# Patient Record
Sex: FEMALE | Race: WHITE | NOT HISPANIC OR LATINO | Employment: FULL TIME | URBAN - NONMETROPOLITAN AREA
[De-identification: names, ages, dates, MRNs, and addresses within clinical notes are randomized per-mention and may not be internally consistent; named-entity substitution may affect disease eponyms.]

---

## 2017-02-12 ENCOUNTER — HOSPITAL ENCOUNTER (INPATIENT)
Facility: HOSPITAL | Age: 37
LOS: 3 days | End: 2017-02-15
Attending: EMERGENCY MEDICINE | Admitting: INTERNAL MEDICINE

## 2017-02-12 ENCOUNTER — APPOINTMENT (OUTPATIENT)
Dept: GENERAL RADIOLOGY | Facility: HOSPITAL | Age: 37
End: 2017-02-12

## 2017-02-12 DIAGNOSIS — F10.939 ALCOHOL WITHDRAWAL, WITH UNSPECIFIED COMPLICATION (HCC): Primary | ICD-10-CM

## 2017-02-12 DIAGNOSIS — F41.9 ANXIETY: ICD-10-CM

## 2017-02-12 LAB
ACETONE BLD QL: NEGATIVE
ALBUMIN SERPL-MCNC: 4.6 G/DL (ref 3.4–4.8)
ALBUMIN/GLOB SERPL: 1.4 G/DL (ref 1.1–1.8)
ALP SERPL-CCNC: 85 U/L (ref 38–126)
ALT SERPL W P-5'-P-CCNC: 71 U/L (ref 9–52)
AMPHET+METHAMPHET UR QL: NEGATIVE
ANION GAP SERPL CALCULATED.3IONS-SCNC: 12 MMOL/L (ref 5–15)
ANION GAP SERPL CALCULATED.3IONS-SCNC: 17 MMOL/L (ref 5–15)
AST SERPL-CCNC: 48 U/L (ref 14–36)
B-HCG UR QL: NEGATIVE
BACTERIA UR QL AUTO: ABNORMAL /HPF
BARBITURATES UR QL SCN: NEGATIVE
BASOPHILS # BLD AUTO: 0.01 10*3/MM3 (ref 0–0.2)
BASOPHILS # BLD AUTO: 0.02 10*3/MM3 (ref 0–0.2)
BASOPHILS NFR BLD AUTO: 0.1 % (ref 0–2)
BASOPHILS NFR BLD AUTO: 0.3 % (ref 0–2)
BENZODIAZ UR QL SCN: NEGATIVE
BILIRUB SERPL-MCNC: 0.7 MG/DL (ref 0.2–1.3)
BILIRUB UR QL STRIP: NEGATIVE
BUN BLD-MCNC: 7 MG/DL (ref 7–21)
BUN BLD-MCNC: 8 MG/DL (ref 7–21)
BUN/CREAT SERPL: 13.7 (ref 7–25)
BUN/CREAT SERPL: 14 (ref 7–25)
CALCIUM SPEC-SCNC: 8.7 MG/DL (ref 8.4–10.2)
CALCIUM SPEC-SCNC: 9.5 MG/DL (ref 8.4–10.2)
CANNABINOIDS SERPL QL: NEGATIVE
CHLORIDE SERPL-SCNC: 92 MMOL/L (ref 95–110)
CHLORIDE SERPL-SCNC: 94 MMOL/L (ref 95–110)
CLARITY UR: CLEAR
CO2 SERPL-SCNC: 25 MMOL/L (ref 22–31)
CO2 SERPL-SCNC: 26 MMOL/L (ref 22–31)
COCAINE UR QL: NEGATIVE
COLOR UR: YELLOW
CREAT BLD-MCNC: 0.51 MG/DL (ref 0.5–1)
CREAT BLD-MCNC: 0.57 MG/DL (ref 0.5–1)
DEPRECATED RDW RBC AUTO: 43.2 FL (ref 36.4–46.3)
DEPRECATED RDW RBC AUTO: 44 FL (ref 36.4–46.3)
EOSINOPHIL # BLD AUTO: 0.01 10*3/MM3 (ref 0–0.7)
EOSINOPHIL # BLD AUTO: 0.05 10*3/MM3 (ref 0–0.7)
EOSINOPHIL NFR BLD AUTO: 0.1 % (ref 0–7)
EOSINOPHIL NFR BLD AUTO: 0.6 % (ref 0–7)
ERYTHROCYTE [DISTWIDTH] IN BLOOD BY AUTOMATED COUNT: 12.9 % (ref 11.5–14.5)
ERYTHROCYTE [DISTWIDTH] IN BLOOD BY AUTOMATED COUNT: 13 % (ref 11.5–14.5)
ETHANOL BLD-MCNC: <10 MG/DL (ref 0–10)
ETHANOL UR QL: <0.01 %
GFR SERPL CREATININE-BSD FRML MDRD: 119 ML/MIN/1.73 (ref 64–149)
GFR SERPL CREATININE-BSD FRML MDRD: 135 ML/MIN/1.73 (ref 64–149)
GLOBULIN UR ELPH-MCNC: 3.3 GM/DL (ref 2.3–3.5)
GLUCOSE BLD-MCNC: 266 MG/DL (ref 60–100)
GLUCOSE BLD-MCNC: 298 MG/DL (ref 60–100)
GLUCOSE BLDC GLUCOMTR-MCNC: 244 MG/DL (ref 70–130)
GLUCOSE BLDC GLUCOMTR-MCNC: 256 MG/DL (ref 70–130)
GLUCOSE BLDC GLUCOMTR-MCNC: 272 MG/DL (ref 70–130)
GLUCOSE UR STRIP-MCNC: ABNORMAL MG/DL
HCT VFR BLD AUTO: 37.1 % (ref 35–45)
HCT VFR BLD AUTO: 39 % (ref 35–45)
HGB BLD-MCNC: 13.5 G/DL (ref 12–15.5)
HGB BLD-MCNC: 14.2 G/DL (ref 12–15.5)
HGB UR QL STRIP.AUTO: NEGATIVE
HYALINE CASTS UR QL AUTO: ABNORMAL /LPF
IMM GRANULOCYTES # BLD: 0.02 10*3/MM3 (ref 0–0.02)
IMM GRANULOCYTES # BLD: 0.03 10*3/MM3 (ref 0–0.02)
IMM GRANULOCYTES NFR BLD: 0.3 % (ref 0–0.5)
IMM GRANULOCYTES NFR BLD: 0.4 % (ref 0–0.5)
KETONES UR QL STRIP: ABNORMAL
LEUKOCYTE ESTERASE UR QL STRIP.AUTO: NEGATIVE
LIPASE SERPL-CCNC: 124 U/L (ref 23–300)
LYMPHOCYTES # BLD AUTO: 1.29 10*3/MM3 (ref 0.6–4.2)
LYMPHOCYTES # BLD AUTO: 1.66 10*3/MM3 (ref 0.6–4.2)
LYMPHOCYTES NFR BLD AUTO: 18.6 % (ref 10–50)
LYMPHOCYTES NFR BLD AUTO: 20.6 % (ref 10–50)
MCH RBC QN AUTO: 33.8 PG (ref 26.5–34)
MCH RBC QN AUTO: 33.9 PG (ref 26.5–34)
MCHC RBC AUTO-ENTMCNC: 36.4 G/DL (ref 31.4–36)
MCHC RBC AUTO-ENTMCNC: 36.4 G/DL (ref 31.4–36)
MCV RBC AUTO: 92.8 FL (ref 80–98)
MCV RBC AUTO: 93.1 FL (ref 80–98)
METHADONE UR QL SCN: NEGATIVE
MONOCYTES # BLD AUTO: 0.37 10*3/MM3 (ref 0–0.9)
MONOCYTES # BLD AUTO: 0.66 10*3/MM3 (ref 0–0.9)
MONOCYTES NFR BLD AUTO: 5.3 % (ref 0–12)
MONOCYTES NFR BLD AUTO: 8.2 % (ref 0–12)
NEUTROPHILS # BLD AUTO: 5.24 10*3/MM3 (ref 2–8.6)
NEUTROPHILS # BLD AUTO: 5.66 10*3/MM3 (ref 2–8.6)
NEUTROPHILS NFR BLD AUTO: 70.1 % (ref 37–80)
NEUTROPHILS NFR BLD AUTO: 75.4 % (ref 37–80)
NITRITE UR QL STRIP: NEGATIVE
OPIATES UR QL: NEGATIVE
OXYCODONE UR QL SCN: NEGATIVE
PH BLDV: 7.47 [PH] (ref 7.31–7.42)
PH UR STRIP.AUTO: 6 [PH] (ref 5–9)
PLATELET # BLD AUTO: 184 10*3/MM3 (ref 150–450)
PLATELET # BLD AUTO: 185 10*3/MM3 (ref 150–450)
PMV BLD AUTO: 10.1 FL (ref 8–12)
PMV BLD AUTO: 9.9 FL (ref 8–12)
POTASSIUM BLD-SCNC: 4.1 MMOL/L (ref 3.5–5.1)
POTASSIUM BLD-SCNC: 4.2 MMOL/L (ref 3.5–5.1)
PROT SERPL-MCNC: 7.9 G/DL (ref 6.3–8.6)
PROT UR QL STRIP: ABNORMAL
RBC # BLD AUTO: 4 10*6/MM3 (ref 3.77–5.16)
RBC # BLD AUTO: 4.19 10*6/MM3 (ref 3.77–5.16)
RBC # UR: ABNORMAL /HPF
REF LAB TEST METHOD: ABNORMAL
SODIUM BLD-SCNC: 132 MMOL/L (ref 137–145)
SODIUM BLD-SCNC: 134 MMOL/L (ref 137–145)
SP GR UR STRIP: 1.03 (ref 1–1.03)
SQUAMOUS #/AREA URNS HPF: ABNORMAL /HPF
TROPONIN I SERPL-MCNC: <0.012 NG/ML
TSH SERPL DL<=0.05 MIU/L-ACNC: 1.64 MIU/ML (ref 0.46–4.68)
UROBILINOGEN UR QL STRIP: ABNORMAL
WBC NRBC COR # BLD: 6.95 10*3/MM3 (ref 3.2–9.8)
WBC NRBC COR # BLD: 8.07 10*3/MM3 (ref 3.2–9.8)
WBC UR QL AUTO: ABNORMAL /HPF
WHOLE BLOOD HOLD SPECIMEN: NORMAL

## 2017-02-12 PROCEDURE — 80053 COMPREHEN METABOLIC PANEL: CPT | Performed by: EMERGENCY MEDICINE

## 2017-02-12 PROCEDURE — 82962 GLUCOSE BLOOD TEST: CPT

## 2017-02-12 PROCEDURE — 80307 DRUG TEST PRSMV CHEM ANLYZR: CPT | Performed by: EMERGENCY MEDICINE

## 2017-02-12 PROCEDURE — 94640 AIRWAY INHALATION TREATMENT: CPT

## 2017-02-12 PROCEDURE — 25810000003 DEXTROSE-NACL PER 500 ML: Performed by: EMERGENCY MEDICINE

## 2017-02-12 PROCEDURE — 85025 COMPLETE CBC W/AUTO DIFF WBC: CPT | Performed by: FAMILY MEDICINE

## 2017-02-12 PROCEDURE — 63710000001 INSULIN DETEMIR PER 5 UNITS: Performed by: FAMILY MEDICINE

## 2017-02-12 PROCEDURE — 84443 ASSAY THYROID STIM HORMONE: CPT | Performed by: EMERGENCY MEDICINE

## 2017-02-12 PROCEDURE — 25010000002 THIAMINE PER 100 MG: Performed by: EMERGENCY MEDICINE

## 2017-02-12 PROCEDURE — 82009 KETONE BODYS QUAL: CPT | Performed by: EMERGENCY MEDICINE

## 2017-02-12 PROCEDURE — 63710000001 INSULIN ASPART PER 5 UNITS: Performed by: FAMILY MEDICINE

## 2017-02-12 PROCEDURE — 85025 COMPLETE CBC W/AUTO DIFF WBC: CPT | Performed by: EMERGENCY MEDICINE

## 2017-02-12 PROCEDURE — 25010000002 MAGNESIUM SULFATE PER 500 MG OF MAGNESIUM: Performed by: EMERGENCY MEDICINE

## 2017-02-12 PROCEDURE — 82800 BLOOD PH: CPT | Performed by: EMERGENCY MEDICINE

## 2017-02-12 PROCEDURE — 25010000002 ONDANSETRON PER 1 MG: Performed by: EMERGENCY MEDICINE

## 2017-02-12 PROCEDURE — 71010 HC CHEST PA OR AP: CPT

## 2017-02-12 PROCEDURE — 83690 ASSAY OF LIPASE: CPT | Performed by: EMERGENCY MEDICINE

## 2017-02-12 PROCEDURE — 84484 ASSAY OF TROPONIN QUANT: CPT | Performed by: EMERGENCY MEDICINE

## 2017-02-12 PROCEDURE — G0378 HOSPITAL OBSERVATION PER HR: HCPCS

## 2017-02-12 PROCEDURE — 81001 URINALYSIS AUTO W/SCOPE: CPT | Performed by: EMERGENCY MEDICINE

## 2017-02-12 PROCEDURE — 99284 EMERGENCY DEPT VISIT MOD MDM: CPT

## 2017-02-12 PROCEDURE — 81025 URINE PREGNANCY TEST: CPT | Performed by: EMERGENCY MEDICINE

## 2017-02-12 RX ORDER — BLOOD-GLUCOSE METER
KIT MISCELLANEOUS
COMMUNITY
Start: 2016-12-09 | End: 2017-12-10

## 2017-02-12 RX ORDER — PROMETHAZINE HYDROCHLORIDE 25 MG/1
25 TABLET ORAL EVERY 6 HOURS PRN
Status: DISCONTINUED | OUTPATIENT
Start: 2017-02-12 | End: 2017-02-15 | Stop reason: HOSPADM

## 2017-02-12 RX ORDER — ALBUTEROL SULFATE 90 UG/1
2 AEROSOL, METERED RESPIRATORY (INHALATION)
COMMUNITY
Start: 2016-11-14 | End: 2018-01-30 | Stop reason: HOSPADM

## 2017-02-12 RX ORDER — ALBUTEROL SULFATE 2.5 MG/3ML
2.5 SOLUTION RESPIRATORY (INHALATION)
Status: DISCONTINUED | OUTPATIENT
Start: 2017-02-12 | End: 2017-02-15 | Stop reason: HOSPADM

## 2017-02-12 RX ORDER — CITALOPRAM 40 MG/1
40 TABLET ORAL
COMMUNITY
Start: 2016-11-14 | End: 2018-03-02 | Stop reason: HOSPADM

## 2017-02-12 RX ORDER — ONDANSETRON 2 MG/ML
4 INJECTION INTRAMUSCULAR; INTRAVENOUS EVERY 6 HOURS PRN
Status: DISCONTINUED | OUTPATIENT
Start: 2017-02-12 | End: 2017-02-15 | Stop reason: HOSPADM

## 2017-02-12 RX ORDER — DOCUSATE SODIUM 100 MG/1
100 CAPSULE, LIQUID FILLED ORAL 2 TIMES DAILY
Status: DISCONTINUED | OUTPATIENT
Start: 2017-02-12 | End: 2017-02-15 | Stop reason: HOSPADM

## 2017-02-12 RX ORDER — ACETAMINOPHEN 325 MG/1
650 TABLET ORAL EVERY 4 HOURS PRN
Status: DISCONTINUED | OUTPATIENT
Start: 2017-02-12 | End: 2017-02-15 | Stop reason: HOSPADM

## 2017-02-12 RX ORDER — SODIUM CHLORIDE 0.9 % (FLUSH) 0.9 %
1-10 SYRINGE (ML) INJECTION AS NEEDED
Status: DISCONTINUED | OUTPATIENT
Start: 2017-02-12 | End: 2017-02-15 | Stop reason: HOSPADM

## 2017-02-12 RX ORDER — LANCETS 23 GAUGE
1 EACH MISCELLANEOUS
COMMUNITY
Start: 2016-12-09

## 2017-02-12 RX ORDER — LISINOPRIL 10 MG/1
TABLET ORAL
COMMUNITY
Start: 2016-11-14 | End: 2018-03-02 | Stop reason: HOSPADM

## 2017-02-12 RX ORDER — ALPRAZOLAM 0.5 MG/1
0.5 TABLET ORAL ONCE
Status: COMPLETED | OUTPATIENT
Start: 2017-02-12 | End: 2017-02-12

## 2017-02-12 RX ORDER — ALBUTEROL SULFATE 90 UG/1
2 AEROSOL, METERED RESPIRATORY (INHALATION)
Status: DISCONTINUED | OUTPATIENT
Start: 2017-02-12 | End: 2017-02-12 | Stop reason: ALTCHOICE

## 2017-02-12 RX ORDER — NICOTINE POLACRILEX 4 MG
15 LOZENGE BUCCAL
Status: DISCONTINUED | OUTPATIENT
Start: 2017-02-12 | End: 2017-02-15 | Stop reason: HOSPADM

## 2017-02-12 RX ORDER — DEXTROSE MONOHYDRATE 25 G/50ML
25 INJECTION, SOLUTION INTRAVENOUS
Status: DISCONTINUED | OUTPATIENT
Start: 2017-02-12 | End: 2017-02-15 | Stop reason: HOSPADM

## 2017-02-12 RX ORDER — GLYBURIDE 5 MG/1
5 TABLET ORAL
Status: DISCONTINUED | OUTPATIENT
Start: 2017-02-13 | End: 2017-02-15 | Stop reason: HOSPADM

## 2017-02-12 RX ORDER — ONDANSETRON 2 MG/ML
4 INJECTION INTRAMUSCULAR; INTRAVENOUS ONCE
Status: COMPLETED | OUTPATIENT
Start: 2017-02-12 | End: 2017-02-12

## 2017-02-12 RX ORDER — CITALOPRAM 40 MG/1
40 TABLET ORAL DAILY
Status: DISCONTINUED | OUTPATIENT
Start: 2017-02-13 | End: 2017-02-15 | Stop reason: HOSPADM

## 2017-02-12 RX ORDER — PANTOPRAZOLE SODIUM 40 MG/1
40 TABLET, DELAYED RELEASE ORAL
Status: DISCONTINUED | OUTPATIENT
Start: 2017-02-13 | End: 2017-02-15 | Stop reason: HOSPADM

## 2017-02-12 RX ORDER — LISINOPRIL 10 MG/1
10 TABLET ORAL
COMMUNITY
Start: 2016-11-14 | End: 2017-03-09

## 2017-02-12 RX ADMIN — ONDANSETRON 4 MG: 2 INJECTION INTRAMUSCULAR; INTRAVENOUS at 17:45

## 2017-02-12 RX ADMIN — INSULIN ASPART 4 UNITS: 100 INJECTION, SOLUTION INTRAVENOUS; SUBCUTANEOUS at 20:56

## 2017-02-12 RX ADMIN — ALPRAZOLAM 0.5 MG: 0.5 TABLET ORAL at 17:49

## 2017-02-12 RX ADMIN — ALBUTEROL SULFATE 2.5 MG: 2.5 SOLUTION RESPIRATORY (INHALATION) at 21:03

## 2017-02-12 RX ADMIN — FOLIC ACID 1000 ML/HR: 5 INJECTION, SOLUTION INTRAMUSCULAR; INTRAVENOUS; SUBCUTANEOUS at 19:24

## 2017-02-12 RX ADMIN — SODIUM CHLORIDE 1000 ML: 9 INJECTION, SOLUTION INTRAVENOUS at 16:21

## 2017-02-12 RX ADMIN — INSULIN DETEMIR 10 UNITS: 100 INJECTION, SOLUTION SUBCUTANEOUS at 20:56

## 2017-02-13 PROBLEM — F10.20 ALCOHOL USE DISORDER, SEVERE, DEPENDENCE: Status: ACTIVE | Noted: 2017-02-13

## 2017-02-13 PROBLEM — F10.280: Status: ACTIVE | Noted: 2017-02-13

## 2017-02-13 PROBLEM — F10.239: Status: ACTIVE | Noted: 2017-02-13

## 2017-02-13 LAB
ANION GAP SERPL CALCULATED.3IONS-SCNC: 6 MMOL/L (ref 5–15)
BASOPHILS # BLD AUTO: 0.02 10*3/MM3 (ref 0–0.2)
BASOPHILS NFR BLD AUTO: 0.3 % (ref 0–2)
BUN BLD-MCNC: 5 MG/DL (ref 7–21)
BUN/CREAT SERPL: 9.8 (ref 7–25)
CALCIUM SPEC-SCNC: 8.4 MG/DL (ref 8.4–10.2)
CHLORIDE SERPL-SCNC: 98 MMOL/L (ref 95–110)
CO2 SERPL-SCNC: 30 MMOL/L (ref 22–31)
CREAT BLD-MCNC: 0.51 MG/DL (ref 0.5–1)
DEPRECATED RDW RBC AUTO: 43.7 FL (ref 36.4–46.3)
EOSINOPHIL # BLD AUTO: 0.07 10*3/MM3 (ref 0–0.7)
EOSINOPHIL NFR BLD AUTO: 1.2 % (ref 0–7)
ERYTHROCYTE [DISTWIDTH] IN BLOOD BY AUTOMATED COUNT: 13.1 % (ref 11.5–14.5)
GFR SERPL CREATININE-BSD FRML MDRD: 135 ML/MIN/1.73 (ref 60–149)
GLUCOSE BLD-MCNC: 191 MG/DL (ref 60–100)
GLUCOSE BLDC GLUCOMTR-MCNC: 196 MG/DL (ref 70–130)
GLUCOSE BLDC GLUCOMTR-MCNC: 204 MG/DL (ref 70–130)
GLUCOSE BLDC GLUCOMTR-MCNC: 225 MG/DL (ref 70–130)
GLUCOSE BLDC GLUCOMTR-MCNC: 278 MG/DL (ref 70–130)
GLUCOSE BLDC GLUCOMTR-MCNC: 298 MG/DL (ref 70–130)
HCT VFR BLD AUTO: 35.9 % (ref 35–45)
HGB BLD-MCNC: 13 G/DL (ref 12–15.5)
IMM GRANULOCYTES # BLD: 0.01 10*3/MM3 (ref 0–0.02)
IMM GRANULOCYTES NFR BLD: 0.2 % (ref 0–0.5)
LYMPHOCYTES # BLD AUTO: 2.07 10*3/MM3 (ref 0.6–4.2)
LYMPHOCYTES NFR BLD AUTO: 34.2 % (ref 10–50)
MCH RBC QN AUTO: 33.6 PG (ref 26.5–34)
MCHC RBC AUTO-ENTMCNC: 36.2 G/DL (ref 31.4–36)
MCV RBC AUTO: 92.8 FL (ref 80–98)
MONOCYTES # BLD AUTO: 0.55 10*3/MM3 (ref 0–0.9)
MONOCYTES NFR BLD AUTO: 9.1 % (ref 0–12)
NEUTROPHILS # BLD AUTO: 3.34 10*3/MM3 (ref 2–8.6)
NEUTROPHILS NFR BLD AUTO: 55 % (ref 37–80)
NRBC BLD MANUAL-RTO: 0 /100 WBC (ref 0–0)
PLATELET # BLD AUTO: 177 10*3/MM3 (ref 150–450)
PMV BLD AUTO: 10.1 FL (ref 8–12)
POTASSIUM BLD-SCNC: 3.2 MMOL/L (ref 3.5–5.1)
RBC # BLD AUTO: 3.87 10*6/MM3 (ref 3.77–5.16)
SODIUM BLD-SCNC: 134 MMOL/L (ref 137–145)
WBC NRBC COR # BLD: 6.06 10*3/MM3 (ref 3.2–9.8)

## 2017-02-13 PROCEDURE — 85025 COMPLETE CBC W/AUTO DIFF WBC: CPT | Performed by: FAMILY MEDICINE

## 2017-02-13 PROCEDURE — 99252 IP/OBS CONSLTJ NEW/EST SF 35: CPT | Performed by: NURSE PRACTITIONER

## 2017-02-13 PROCEDURE — 25010000002 MAGNESIUM SULFATE PER 500 MG OF MAGNESIUM: Performed by: NURSE PRACTITIONER

## 2017-02-13 PROCEDURE — 94640 AIRWAY INHALATION TREATMENT: CPT

## 2017-02-13 PROCEDURE — 63710000001 INSULIN ASPART PER 5 UNITS: Performed by: FAMILY MEDICINE

## 2017-02-13 PROCEDURE — 80048 BASIC METABOLIC PNL TOTAL CA: CPT | Performed by: FAMILY MEDICINE

## 2017-02-13 PROCEDURE — 25010000002 ONDANSETRON PER 1 MG: Performed by: FAMILY MEDICINE

## 2017-02-13 PROCEDURE — 82962 GLUCOSE BLOOD TEST: CPT

## 2017-02-13 PROCEDURE — 63710000001 INSULIN DETEMIR PER 5 UNITS: Performed by: FAMILY MEDICINE

## 2017-02-13 PROCEDURE — G0378 HOSPITAL OBSERVATION PER HR: HCPCS

## 2017-02-13 PROCEDURE — 25010000002 THIAMINE PER 100 MG: Performed by: NURSE PRACTITIONER

## 2017-02-13 PROCEDURE — 25010000002 LORAZEPAM PER 2 MG: Performed by: NURSE PRACTITIONER

## 2017-02-13 RX ORDER — LORAZEPAM 2 MG/ML
1 INJECTION INTRAMUSCULAR
Status: DISCONTINUED | OUTPATIENT
Start: 2017-02-13 | End: 2017-02-15 | Stop reason: HOSPADM

## 2017-02-13 RX ORDER — LORAZEPAM 2 MG/ML
2 INJECTION INTRAMUSCULAR
Status: DISCONTINUED | OUTPATIENT
Start: 2017-02-13 | End: 2017-02-15 | Stop reason: HOSPADM

## 2017-02-13 RX ORDER — LORAZEPAM 1 MG/1
1 TABLET ORAL
Status: DISCONTINUED | OUTPATIENT
Start: 2017-02-13 | End: 2017-02-15 | Stop reason: HOSPADM

## 2017-02-13 RX ORDER — LORAZEPAM 2 MG/1
2 TABLET ORAL
Status: DISCONTINUED | OUTPATIENT
Start: 2017-02-13 | End: 2017-02-15 | Stop reason: HOSPADM

## 2017-02-13 RX ORDER — POTASSIUM CHLORIDE 20 MEQ/1
40 TABLET, EXTENDED RELEASE ORAL ONCE
Status: COMPLETED | OUTPATIENT
Start: 2017-02-13 | End: 2017-02-13

## 2017-02-13 RX ADMIN — INSULIN ASPART 2 UNITS: 100 INJECTION, SOLUTION INTRAVENOUS; SUBCUTANEOUS at 08:36

## 2017-02-13 RX ADMIN — INSULIN ASPART 10 UNITS: 100 INJECTION, SOLUTION INTRAVENOUS; SUBCUTANEOUS at 12:14

## 2017-02-13 RX ADMIN — ALBUTEROL SULFATE 2.5 MG: 2.5 SOLUTION RESPIRATORY (INHALATION) at 16:06

## 2017-02-13 RX ADMIN — ONDANSETRON 4 MG: 2 INJECTION INTRAMUSCULAR; INTRAVENOUS at 08:36

## 2017-02-13 RX ADMIN — METFORMIN HYDROCHLORIDE 500 MG: 500 TABLET ORAL at 08:38

## 2017-02-13 RX ADMIN — LORAZEPAM 1 MG: 2 INJECTION INTRAMUSCULAR; INTRAVENOUS at 15:33

## 2017-02-13 RX ADMIN — PANTOPRAZOLE SODIUM 40 MG: 40 TABLET, DELAYED RELEASE ORAL at 05:31

## 2017-02-13 RX ADMIN — INSULIN ASPART 10 UNITS: 100 INJECTION, SOLUTION INTRAVENOUS; SUBCUTANEOUS at 08:38

## 2017-02-13 RX ADMIN — INSULIN ASPART 4 UNITS: 100 INJECTION, SOLUTION INTRAVENOUS; SUBCUTANEOUS at 20:28

## 2017-02-13 RX ADMIN — INSULIN ASPART 3 UNITS: 100 INJECTION, SOLUTION INTRAVENOUS; SUBCUTANEOUS at 12:14

## 2017-02-13 RX ADMIN — POTASSIUM CHLORIDE 40 MEQ: 20 TABLET, EXTENDED RELEASE ORAL at 15:33

## 2017-02-13 RX ADMIN — ALBUTEROL SULFATE 2.5 MG: 2.5 SOLUTION RESPIRATORY (INHALATION) at 08:07

## 2017-02-13 RX ADMIN — INSULIN DETEMIR 10 UNITS: 100 INJECTION, SOLUTION SUBCUTANEOUS at 20:29

## 2017-02-13 RX ADMIN — POTASSIUM CHLORIDE 40 MEQ: 20 TABLET, EXTENDED RELEASE ORAL at 17:33

## 2017-02-13 RX ADMIN — METFORMIN HYDROCHLORIDE 500 MG: 500 TABLET ORAL at 17:33

## 2017-02-13 RX ADMIN — INSULIN ASPART 10 UNITS: 100 INJECTION, SOLUTION INTRAVENOUS; SUBCUTANEOUS at 17:34

## 2017-02-13 RX ADMIN — FOLIC ACID 100 ML/HR: 5 INJECTION, SOLUTION INTRAMUSCULAR; INTRAVENOUS; SUBCUTANEOUS at 17:32

## 2017-02-13 RX ADMIN — INSULIN ASPART 3 UNITS: 100 INJECTION, SOLUTION INTRAVENOUS; SUBCUTANEOUS at 17:33

## 2017-02-13 RX ADMIN — CITALOPRAM HYDROBROMIDE 40 MG: 40 TABLET ORAL at 08:36

## 2017-02-14 LAB
ANION GAP SERPL CALCULATED.3IONS-SCNC: 6 MMOL/L (ref 5–15)
BASOPHILS # BLD AUTO: 0 10*3/MM3 (ref 0–0.2)
BASOPHILS NFR BLD AUTO: 0 % (ref 0–2)
BUN BLD-MCNC: 7 MG/DL (ref 7–21)
BUN/CREAT SERPL: 12.7 (ref 7–25)
CALCIUM SPEC-SCNC: 8.7 MG/DL (ref 8.4–10.2)
CHLORIDE SERPL-SCNC: 102 MMOL/L (ref 95–110)
CO2 SERPL-SCNC: 26 MMOL/L (ref 22–31)
CREAT BLD-MCNC: 0.55 MG/DL (ref 0.5–1)
DEPRECATED RDW RBC AUTO: 47.9 FL (ref 36.4–46.3)
EOSINOPHIL # BLD AUTO: 0.09 10*3/MM3 (ref 0–0.7)
EOSINOPHIL NFR BLD AUTO: 1.9 % (ref 0–7)
ERYTHROCYTE [DISTWIDTH] IN BLOOD BY AUTOMATED COUNT: 13.7 % (ref 11.5–14.5)
GFR SERPL CREATININE-BSD FRML MDRD: 124 ML/MIN/1.73 (ref 60–149)
GLUCOSE BLD-MCNC: 241 MG/DL (ref 60–100)
GLUCOSE BLDC GLUCOMTR-MCNC: 155 MG/DL (ref 70–130)
GLUCOSE BLDC GLUCOMTR-MCNC: 242 MG/DL (ref 70–130)
GLUCOSE BLDC GLUCOMTR-MCNC: 265 MG/DL (ref 70–130)
GLUCOSE BLDC GLUCOMTR-MCNC: 84 MG/DL (ref 70–130)
HCT VFR BLD AUTO: 37.8 % (ref 35–45)
HGB BLD-MCNC: 13.1 G/DL (ref 12–15.5)
IMM GRANULOCYTES # BLD: 0.01 10*3/MM3 (ref 0–0.02)
IMM GRANULOCYTES NFR BLD: 0.2 % (ref 0–0.5)
LYMPHOCYTES # BLD AUTO: 1.18 10*3/MM3 (ref 0.6–4.2)
LYMPHOCYTES NFR BLD AUTO: 25.3 % (ref 10–50)
MCH RBC QN AUTO: 33.2 PG (ref 26.5–34)
MCHC RBC AUTO-ENTMCNC: 34.7 G/DL (ref 31.4–36)
MCV RBC AUTO: 95.7 FL (ref 80–98)
MONOCYTES # BLD AUTO: 0.37 10*3/MM3 (ref 0–0.9)
MONOCYTES NFR BLD AUTO: 7.9 % (ref 0–12)
NEUTROPHILS # BLD AUTO: 3.02 10*3/MM3 (ref 2–8.6)
NEUTROPHILS NFR BLD AUTO: 64.7 % (ref 37–80)
PLATELET # BLD AUTO: 156 10*3/MM3 (ref 150–450)
PMV BLD AUTO: 9.8 FL (ref 8–12)
POTASSIUM BLD-SCNC: 4.5 MMOL/L (ref 3.5–5.1)
RBC # BLD AUTO: 3.95 10*6/MM3 (ref 3.77–5.16)
SODIUM BLD-SCNC: 134 MMOL/L (ref 137–145)
WBC NRBC COR # BLD: 4.67 10*3/MM3 (ref 3.2–9.8)

## 2017-02-14 PROCEDURE — 63710000001 INSULIN ASPART PER 5 UNITS: Performed by: FAMILY MEDICINE

## 2017-02-14 PROCEDURE — 80048 BASIC METABOLIC PNL TOTAL CA: CPT | Performed by: FAMILY MEDICINE

## 2017-02-14 PROCEDURE — 63710000001 INSULIN DETEMIR PER 5 UNITS: Performed by: NURSE PRACTITIONER

## 2017-02-14 PROCEDURE — 82962 GLUCOSE BLOOD TEST: CPT

## 2017-02-14 PROCEDURE — 25010000002 MAGNESIUM SULFATE PER 500 MG OF MAGNESIUM: Performed by: NURSE PRACTITIONER

## 2017-02-14 PROCEDURE — 85025 COMPLETE CBC W/AUTO DIFF WBC: CPT | Performed by: FAMILY MEDICINE

## 2017-02-14 PROCEDURE — 25010000002 THIAMINE PER 100 MG: Performed by: NURSE PRACTITIONER

## 2017-02-14 PROCEDURE — 94640 AIRWAY INHALATION TREATMENT: CPT

## 2017-02-14 RX ORDER — NICOTINE 21 MG/24HR
1 PATCH, TRANSDERMAL 24 HOURS TRANSDERMAL EVERY 24 HOURS
Status: DISCONTINUED | OUTPATIENT
Start: 2017-02-14 | End: 2017-02-15 | Stop reason: HOSPADM

## 2017-02-14 RX ADMIN — CITALOPRAM HYDROBROMIDE 40 MG: 40 TABLET ORAL at 09:33

## 2017-02-14 RX ADMIN — INSULIN ASPART 10 UNITS: 100 INJECTION, SOLUTION INTRAVENOUS; SUBCUTANEOUS at 11:22

## 2017-02-14 RX ADMIN — FOLIC ACID 100 ML/HR: 5 INJECTION, SOLUTION INTRAMUSCULAR; INTRAVENOUS; SUBCUTANEOUS at 11:20

## 2017-02-14 RX ADMIN — GLYBURIDE 5 MG: 5 TABLET ORAL at 09:32

## 2017-02-14 RX ADMIN — METFORMIN HYDROCHLORIDE 500 MG: 500 TABLET ORAL at 09:33

## 2017-02-14 RX ADMIN — LORAZEPAM 1 MG: 1 TABLET ORAL at 07:42

## 2017-02-14 RX ADMIN — INSULIN ASPART 4 UNITS: 100 INJECTION, SOLUTION INTRAVENOUS; SUBCUTANEOUS at 09:33

## 2017-02-14 RX ADMIN — INSULIN ASPART 10 UNITS: 100 INJECTION, SOLUTION INTRAVENOUS; SUBCUTANEOUS at 17:50

## 2017-02-14 RX ADMIN — DOCUSATE SODIUM 100 MG: 100 CAPSULE, LIQUID FILLED ORAL at 09:33

## 2017-02-14 RX ADMIN — INSULIN ASPART 2 UNITS: 100 INJECTION, SOLUTION INTRAVENOUS; SUBCUTANEOUS at 17:49

## 2017-02-14 RX ADMIN — INSULIN ASPART 3 UNITS: 100 INJECTION, SOLUTION INTRAVENOUS; SUBCUTANEOUS at 11:21

## 2017-02-14 RX ADMIN — ALBUTEROL SULFATE 2.5 MG: 2.5 SOLUTION RESPIRATORY (INHALATION) at 07:45

## 2017-02-14 RX ADMIN — LORAZEPAM 1 MG: 1 TABLET ORAL at 11:30

## 2017-02-14 RX ADMIN — PANTOPRAZOLE SODIUM 40 MG: 40 TABLET, DELAYED RELEASE ORAL at 06:08

## 2017-02-14 RX ADMIN — INSULIN ASPART 10 UNITS: 100 INJECTION, SOLUTION INTRAVENOUS; SUBCUTANEOUS at 09:39

## 2017-02-14 RX ADMIN — ALBUTEROL SULFATE 2.5 MG: 2.5 SOLUTION RESPIRATORY (INHALATION) at 20:42

## 2017-02-14 RX ADMIN — DOCUSATE SODIUM 100 MG: 100 CAPSULE, LIQUID FILLED ORAL at 17:48

## 2017-02-14 RX ADMIN — INSULIN DETEMIR 15 UNITS: 100 INJECTION, SOLUTION SUBCUTANEOUS at 20:40

## 2017-02-14 RX ADMIN — METFORMIN HYDROCHLORIDE 500 MG: 500 TABLET ORAL at 17:52

## 2017-02-15 ENCOUNTER — HOSPITAL ENCOUNTER (INPATIENT)
Facility: HOSPITAL | Age: 37
LOS: 3 days | Discharge: HOME | End: 2017-02-18
Attending: PSYCHIATRY & NEUROLOGY | Admitting: PSYCHIATRY & NEUROLOGY

## 2017-02-15 VITALS
BODY MASS INDEX: 33.75 KG/M2 | RESPIRATION RATE: 18 BRPM | DIASTOLIC BLOOD PRESSURE: 96 MMHG | TEMPERATURE: 97.6 F | SYSTOLIC BLOOD PRESSURE: 152 MMHG | HEIGHT: 66 IN | HEART RATE: 104 BPM | OXYGEN SATURATION: 96 % | WEIGHT: 210 LBS

## 2017-02-15 PROBLEM — R45.851 SUICIDAL IDEATION: Status: ACTIVE | Noted: 2017-02-15

## 2017-02-15 LAB
ANION GAP SERPL CALCULATED.3IONS-SCNC: 8 MMOL/L (ref 5–15)
BASOPHILS # BLD AUTO: 0.01 10*3/MM3 (ref 0–0.2)
BASOPHILS NFR BLD AUTO: 0.1 % (ref 0–2)
BUN BLD-MCNC: 10 MG/DL (ref 7–21)
BUN/CREAT SERPL: 16.4 (ref 7–25)
CALCIUM SPEC-SCNC: 9.3 MG/DL (ref 8.4–10.2)
CHLORIDE SERPL-SCNC: 99 MMOL/L (ref 95–110)
CO2 SERPL-SCNC: 29 MMOL/L (ref 22–31)
CREAT BLD-MCNC: 0.61 MG/DL (ref 0.5–1)
DEPRECATED RDW RBC AUTO: 47.4 FL (ref 36.4–46.3)
EOSINOPHIL # BLD AUTO: 0.15 10*3/MM3 (ref 0–0.7)
EOSINOPHIL NFR BLD AUTO: 2 % (ref 0–7)
ERYTHROCYTE [DISTWIDTH] IN BLOOD BY AUTOMATED COUNT: 13.6 % (ref 11.5–14.5)
GFR SERPL CREATININE-BSD FRML MDRD: 110 ML/MIN/1.73 (ref 64–149)
GLUCOSE BLD-MCNC: 172 MG/DL (ref 60–100)
GLUCOSE BLDC GLUCOMTR-MCNC: 215 MG/DL (ref 70–130)
GLUCOSE BLDC GLUCOMTR-MCNC: 216 MG/DL (ref 70–130)
GLUCOSE BLDC GLUCOMTR-MCNC: 242 MG/DL (ref 70–130)
HCT VFR BLD AUTO: 38.9 % (ref 35–45)
HGB BLD-MCNC: 13.3 G/DL (ref 12–15.5)
IMM GRANULOCYTES # BLD: 0.02 10*3/MM3 (ref 0–0.02)
IMM GRANULOCYTES NFR BLD: 0.3 % (ref 0–0.5)
LYMPHOCYTES # BLD AUTO: 1.64 10*3/MM3 (ref 0.6–4.2)
LYMPHOCYTES NFR BLD AUTO: 21.6 % (ref 10–50)
MCH RBC QN AUTO: 32.7 PG (ref 26.5–34)
MCHC RBC AUTO-ENTMCNC: 34.2 G/DL (ref 31.4–36)
MCV RBC AUTO: 95.6 FL (ref 80–98)
MONOCYTES # BLD AUTO: 0.49 10*3/MM3 (ref 0–0.9)
MONOCYTES NFR BLD AUTO: 6.5 % (ref 0–12)
NEUTROPHILS # BLD AUTO: 5.28 10*3/MM3 (ref 2–8.6)
NEUTROPHILS NFR BLD AUTO: 69.5 % (ref 37–80)
PLATELET # BLD AUTO: 185 10*3/MM3 (ref 150–450)
PMV BLD AUTO: 11.3 FL (ref 8–12)
POTASSIUM BLD-SCNC: 4.1 MMOL/L (ref 3.5–5.1)
RBC # BLD AUTO: 4.07 10*6/MM3 (ref 3.77–5.16)
SODIUM BLD-SCNC: 136 MMOL/L (ref 137–145)
WBC NRBC COR # BLD: 7.59 10*3/MM3 (ref 3.2–9.8)

## 2017-02-15 PROCEDURE — 82962 GLUCOSE BLOOD TEST: CPT

## 2017-02-15 PROCEDURE — 63710000001 INSULIN ASPART PER 5 UNITS: Performed by: FAMILY MEDICINE

## 2017-02-15 PROCEDURE — 85025 COMPLETE CBC W/AUTO DIFF WBC: CPT | Performed by: FAMILY MEDICINE

## 2017-02-15 PROCEDURE — 94760 N-INVAS EAR/PLS OXIMETRY 1: CPT

## 2017-02-15 PROCEDURE — 25010000002 THIAMINE PER 100 MG: Performed by: NURSE PRACTITIONER

## 2017-02-15 PROCEDURE — 63710000001 INSULIN ASPART PER 5 UNITS: Performed by: PSYCHIATRY & NEUROLOGY

## 2017-02-15 PROCEDURE — 94640 AIRWAY INHALATION TREATMENT: CPT

## 2017-02-15 PROCEDURE — 25010000002 MAGNESIUM SULFATE PER 500 MG OF MAGNESIUM: Performed by: NURSE PRACTITIONER

## 2017-02-15 PROCEDURE — 63710000001 INSULIN DETEMIR PER 5 UNITS: Performed by: PSYCHIATRY & NEUROLOGY

## 2017-02-15 PROCEDURE — 80048 BASIC METABOLIC PNL TOTAL CA: CPT | Performed by: FAMILY MEDICINE

## 2017-02-15 RX ORDER — DOCUSATE SODIUM 100 MG/1
100 CAPSULE, LIQUID FILLED ORAL 2 TIMES DAILY
Status: DISCONTINUED | OUTPATIENT
Start: 2017-02-15 | End: 2017-02-18 | Stop reason: HOSPADM

## 2017-02-15 RX ORDER — ACETAMINOPHEN 325 MG/1
650 TABLET ORAL EVERY 4 HOURS PRN
Status: DISCONTINUED | OUTPATIENT
Start: 2017-02-15 | End: 2017-02-18 | Stop reason: HOSPADM

## 2017-02-15 RX ORDER — DOCUSATE SODIUM 100 MG/1
100 CAPSULE, LIQUID FILLED ORAL 2 TIMES DAILY
Status: CANCELLED | OUTPATIENT
Start: 2017-02-15

## 2017-02-15 RX ORDER — ALBUTEROL SULFATE 2.5 MG/3ML
2.5 SOLUTION RESPIRATORY (INHALATION)
Status: DISCONTINUED | OUTPATIENT
Start: 2017-02-15 | End: 2017-02-15

## 2017-02-15 RX ORDER — PANTOPRAZOLE SODIUM 40 MG/1
40 TABLET, DELAYED RELEASE ORAL
Status: DISCONTINUED | OUTPATIENT
Start: 2017-02-16 | End: 2017-02-15 | Stop reason: SDUPTHER

## 2017-02-15 RX ORDER — LOPERAMIDE HYDROCHLORIDE 2 MG/1
2 CAPSULE ORAL 4 TIMES DAILY PRN
Status: DISCONTINUED | OUTPATIENT
Start: 2017-02-15 | End: 2017-02-18 | Stop reason: HOSPADM

## 2017-02-15 RX ORDER — ALBUTEROL SULFATE 2.5 MG/3ML
2.5 SOLUTION RESPIRATORY (INHALATION)
Status: DISCONTINUED | OUTPATIENT
Start: 2017-02-15 | End: 2017-02-16

## 2017-02-15 RX ORDER — CLONIDINE HYDROCHLORIDE 0.1 MG/1
0.1 TABLET ORAL EVERY 4 HOURS PRN
Status: DISCONTINUED | OUTPATIENT
Start: 2017-02-15 | End: 2017-02-18 | Stop reason: HOSPADM

## 2017-02-15 RX ORDER — NICOTINE 21 MG/24HR
1 PATCH, TRANSDERMAL 24 HOURS TRANSDERMAL EVERY 24 HOURS
Status: DISCONTINUED | OUTPATIENT
Start: 2017-02-15 | End: 2017-02-18 | Stop reason: HOSPADM

## 2017-02-15 RX ORDER — PANTOPRAZOLE SODIUM 40 MG/1
40 TABLET, DELAYED RELEASE ORAL
Status: DISCONTINUED | OUTPATIENT
Start: 2017-02-16 | End: 2017-02-15

## 2017-02-15 RX ORDER — DOCUSATE SODIUM 100 MG/1
100 CAPSULE, LIQUID FILLED ORAL 2 TIMES DAILY PRN
Status: DISCONTINUED | OUTPATIENT
Start: 2017-02-15 | End: 2017-02-18 | Stop reason: HOSPADM

## 2017-02-15 RX ORDER — GLYBURIDE 5 MG/1
5 TABLET ORAL
Status: DISCONTINUED | OUTPATIENT
Start: 2017-02-16 | End: 2017-02-18 | Stop reason: HOSPADM

## 2017-02-15 RX ORDER — NICOTINE 21 MG/24HR
1 PATCH, TRANSDERMAL 24 HOURS TRANSDERMAL EVERY 24 HOURS
Status: CANCELLED | OUTPATIENT
Start: 2017-02-15

## 2017-02-15 RX ORDER — HYDROXYZINE PAMOATE 50 MG/1
50 CAPSULE ORAL EVERY 6 HOURS PRN
Status: DISCONTINUED | OUTPATIENT
Start: 2017-02-15 | End: 2017-02-18 | Stop reason: HOSPADM

## 2017-02-15 RX ORDER — PROMETHAZINE HYDROCHLORIDE 25 MG/1
25 TABLET ORAL EVERY 6 HOURS PRN
Status: CANCELLED | OUTPATIENT
Start: 2017-02-15

## 2017-02-15 RX ORDER — MAGNESIUM HYDROXIDE/ALUMINUM HYDROXICE/SIMETHICONE 120; 1200; 1200 MG/30ML; MG/30ML; MG/30ML
30 SUSPENSION ORAL EVERY 6 HOURS PRN
Status: DISCONTINUED | OUTPATIENT
Start: 2017-02-15 | End: 2017-02-18 | Stop reason: HOSPADM

## 2017-02-15 RX ORDER — ACETAMINOPHEN 325 MG/1
650 TABLET ORAL EVERY 4 HOURS PRN
Status: DISCONTINUED | OUTPATIENT
Start: 2017-02-15 | End: 2017-02-15

## 2017-02-15 RX ORDER — NICOTINE POLACRILEX 4 MG
15 LOZENGE BUCCAL
Status: CANCELLED | OUTPATIENT
Start: 2017-02-15

## 2017-02-15 RX ORDER — TRAZODONE HYDROCHLORIDE 50 MG/1
50 TABLET ORAL NIGHTLY PRN
Status: DISCONTINUED | OUTPATIENT
Start: 2017-02-15 | End: 2017-02-18 | Stop reason: HOSPADM

## 2017-02-15 RX ORDER — NICOTINE 21 MG/24HR
1 PATCH, TRANSDERMAL 24 HOURS TRANSDERMAL EVERY 24 HOURS
Status: DISCONTINUED | OUTPATIENT
Start: 2017-02-15 | End: 2017-02-15 | Stop reason: SDUPTHER

## 2017-02-15 RX ORDER — CITALOPRAM 40 MG/1
40 TABLET ORAL DAILY
Status: DISCONTINUED | OUTPATIENT
Start: 2017-02-15 | End: 2017-02-15

## 2017-02-15 RX ORDER — NICOTINE POLACRILEX 4 MG
15 LOZENGE BUCCAL
Status: DISCONTINUED | OUTPATIENT
Start: 2017-02-15 | End: 2017-02-18 | Stop reason: HOSPADM

## 2017-02-15 RX ORDER — PROMETHAZINE HYDROCHLORIDE 25 MG/1
25 TABLET ORAL EVERY 6 HOURS PRN
Status: DISCONTINUED | OUTPATIENT
Start: 2017-02-15 | End: 2017-02-15

## 2017-02-15 RX ORDER — CITALOPRAM 40 MG/1
40 TABLET ORAL DAILY
Status: DISCONTINUED | OUTPATIENT
Start: 2017-02-16 | End: 2017-02-16

## 2017-02-15 RX ORDER — ALBUTEROL SULFATE 2.5 MG/3ML
2.5 SOLUTION RESPIRATORY (INHALATION)
Status: CANCELLED | OUTPATIENT
Start: 2017-02-15

## 2017-02-15 RX ORDER — GLYBURIDE 5 MG/1
5 TABLET ORAL
Status: CANCELLED | OUTPATIENT
Start: 2017-02-16

## 2017-02-15 RX ORDER — PANTOPRAZOLE SODIUM 40 MG/1
40 TABLET, DELAYED RELEASE ORAL
Status: CANCELLED | OUTPATIENT
Start: 2017-02-16

## 2017-02-15 RX ORDER — NICOTINE 21 MG/24HR
1 PATCH, TRANSDERMAL 24 HOURS TRANSDERMAL EVERY 24 HOURS
Status: DISCONTINUED | OUTPATIENT
Start: 2017-02-15 | End: 2017-02-15

## 2017-02-15 RX ORDER — CITALOPRAM 40 MG/1
40 TABLET ORAL DAILY
Status: CANCELLED | OUTPATIENT
Start: 2017-02-16

## 2017-02-15 RX ORDER — ACETAMINOPHEN 325 MG/1
650 TABLET ORAL EVERY 4 HOURS PRN
Status: CANCELLED | OUTPATIENT
Start: 2017-02-15

## 2017-02-15 RX ADMIN — ALBUTEROL SULFATE 2.5 MG: 2.5 SOLUTION RESPIRATORY (INHALATION) at 20:53

## 2017-02-15 RX ADMIN — NICOTINE 1 PATCH: 21 PATCH, EXTENDED RELEASE TRANSDERMAL at 15:24

## 2017-02-15 RX ADMIN — LORAZEPAM 1 MG: 1 TABLET ORAL at 00:26

## 2017-02-15 RX ADMIN — FOLIC ACID 100 ML/HR: 5 INJECTION, SOLUTION INTRAMUSCULAR; INTRAVENOUS; SUBCUTANEOUS at 11:22

## 2017-02-15 RX ADMIN — CLONIDINE HYDROCHLORIDE 0.1 MG: 0.1 TABLET ORAL at 18:19

## 2017-02-15 RX ADMIN — INSULIN ASPART 10 UNITS: 100 INJECTION, SOLUTION INTRAVENOUS; SUBCUTANEOUS at 10:50

## 2017-02-15 RX ADMIN — INSULIN DETEMIR 15 UNITS: 100 INJECTION, SOLUTION SUBCUTANEOUS at 20:19

## 2017-02-15 RX ADMIN — ALBUTEROL SULFATE 2.5 MG: 2.5 SOLUTION RESPIRATORY (INHALATION) at 07:29

## 2017-02-15 RX ADMIN — PANTOPRAZOLE SODIUM 40 MG: 40 TABLET, DELAYED RELEASE ORAL at 05:37

## 2017-02-15 RX ADMIN — NICOTINE 1 PATCH: 21 PATCH TRANSDERMAL at 00:24

## 2017-02-15 RX ADMIN — CITALOPRAM HYDROBROMIDE 40 MG: 40 TABLET ORAL at 10:01

## 2017-02-15 RX ADMIN — INSULIN ASPART 4 UNITS: 100 INJECTION, SOLUTION INTRAVENOUS; SUBCUTANEOUS at 11:33

## 2017-02-15 RX ADMIN — HYDROXYZINE PAMOATE 50 MG: 50 CAPSULE ORAL at 18:19

## 2017-02-15 RX ADMIN — METFORMIN HYDROCHLORIDE 500 MG: 500 TABLET ORAL at 10:03

## 2017-02-15 RX ADMIN — INSULIN ASPART 3 UNITS: 100 INJECTION, SOLUTION INTRAVENOUS; SUBCUTANEOUS at 20:31

## 2017-02-15 RX ADMIN — GLYBURIDE 5 MG: 5 TABLET ORAL at 10:02

## 2017-02-15 RX ADMIN — TRAZODONE HYDROCHLORIDE 50 MG: 50 TABLET ORAL at 20:30

## 2017-02-15 RX ADMIN — INSULIN ASPART 10 UNITS: 100 INJECTION, SOLUTION INTRAVENOUS; SUBCUTANEOUS at 17:23

## 2017-02-15 RX ADMIN — INSULIN ASPART 3 UNITS: 100 INJECTION, SOLUTION INTRAVENOUS; SUBCUTANEOUS at 17:24

## 2017-02-15 NOTE — PLAN OF CARE
Problem: [facility] Patient Care Overview (Adult)  Goal: Plan of Care Review  Outcome: Ongoing (interventions implemented as appropriate)  Goal: Interdisciplinary Rounds/Family Conference  Outcome: Ongoing (interventions implemented as appropriate)  Goal: Individualization and Mutuality  Outcome: Ongoing (interventions implemented as appropriate)  Goal: Discharge Needs Assessment  Outcome: Ongoing (interventions implemented as appropriate)    Problem:  Overarching Goals  Goal: Adheres to Safety Considerations for Self and Others  Outcome: Ongoing (interventions implemented as appropriate)  Goal: Optimized Coping Skills in Response to Life Stressors  Outcome: Ongoing (interventions implemented as appropriate)  Goal: Develops/Participates in Therapeutic [place] to Support Successful Transition  Outcome: Ongoing (interventions implemented as appropriate)
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
## 2017-02-15 NOTE — NURSING NOTE
Pt ambulated from 4W for admission to U, pt stable, no s/s of distress noted. All admission paperwork reviewed with patient and all consents signed and placed in scan box. Group times, visitation, phone times and unit routine discussed with patient. Pt denies any questions or concerns.

## 2017-02-16 LAB
GLUCOSE BLDC GLUCOMTR-MCNC: 158 MG/DL (ref 70–130)
GLUCOSE BLDC GLUCOMTR-MCNC: 193 MG/DL (ref 70–130)
GLUCOSE BLDC GLUCOMTR-MCNC: 239 MG/DL (ref 70–130)
GLUCOSE BLDC GLUCOMTR-MCNC: 277 MG/DL (ref 70–130)
GLUCOSE BLDC GLUCOMTR-MCNC: 99 MG/DL (ref 70–130)

## 2017-02-16 PROCEDURE — 82962 GLUCOSE BLOOD TEST: CPT

## 2017-02-16 PROCEDURE — 63710000001 INSULIN DETEMIR PER 5 UNITS: Performed by: PSYCHIATRY & NEUROLOGY

## 2017-02-16 PROCEDURE — 90791 PSYCH DIAGNOSTIC EVALUATION: CPT | Performed by: NURSE PRACTITIONER

## 2017-02-16 PROCEDURE — 99232 SBSQ HOSP IP/OBS MODERATE 35: CPT | Performed by: FAMILY MEDICINE

## 2017-02-16 PROCEDURE — 63710000001 INSULIN ASPART PER 5 UNITS: Performed by: PSYCHIATRY & NEUROLOGY

## 2017-02-16 RX ORDER — DULOXETIN HYDROCHLORIDE 30 MG/1
30 CAPSULE, DELAYED RELEASE ORAL DAILY
Status: DISCONTINUED | OUTPATIENT
Start: 2017-02-16 | End: 2017-02-18 | Stop reason: HOSPADM

## 2017-02-16 RX ORDER — ALBUTEROL SULFATE 2.5 MG/3ML
2.5 SOLUTION RESPIRATORY (INHALATION) EVERY 6 HOURS PRN
Status: DISCONTINUED | OUTPATIENT
Start: 2017-02-16 | End: 2017-02-18 | Stop reason: HOSPADM

## 2017-02-16 RX ORDER — DIPHENOXYLATE HYDROCHLORIDE AND ATROPINE SULFATE 2.5; .025 MG/1; MG/1
1 TABLET ORAL
COMMUNITY
End: 2018-02-28 | Stop reason: HOSPADM

## 2017-02-16 RX ADMIN — CITALOPRAM HYDROBROMIDE 40 MG: 40 TABLET ORAL at 08:04

## 2017-02-16 RX ADMIN — HYDROXYZINE PAMOATE 50 MG: 50 CAPSULE ORAL at 21:05

## 2017-02-16 RX ADMIN — INSULIN ASPART 3 UNITS: 100 INJECTION, SOLUTION INTRAVENOUS; SUBCUTANEOUS at 11:57

## 2017-02-16 RX ADMIN — CLONIDINE HYDROCHLORIDE 0.1 MG: 0.1 TABLET ORAL at 15:49

## 2017-02-16 RX ADMIN — TRAZODONE HYDROCHLORIDE 50 MG: 50 TABLET ORAL at 20:45

## 2017-02-16 RX ADMIN — NICOTINE 1 PATCH: 21 PATCH, EXTENDED RELEASE TRANSDERMAL at 11:58

## 2017-02-16 RX ADMIN — INSULIN ASPART 10 UNITS: 100 INJECTION, SOLUTION INTRAVENOUS; SUBCUTANEOUS at 08:05

## 2017-02-16 RX ADMIN — GLYBURIDE 5 MG: 5 TABLET ORAL at 08:04

## 2017-02-16 RX ADMIN — INSULIN DETEMIR 15 UNITS: 100 INJECTION, SOLUTION SUBCUTANEOUS at 20:46

## 2017-02-16 RX ADMIN — METFORMIN HYDROCHLORIDE 500 MG: 500 TABLET ORAL at 17:11

## 2017-02-16 RX ADMIN — INSULIN ASPART 2 UNITS: 100 INJECTION, SOLUTION INTRAVENOUS; SUBCUTANEOUS at 16:30

## 2017-02-16 RX ADMIN — INSULIN ASPART 4 UNITS: 100 INJECTION, SOLUTION INTRAVENOUS; SUBCUTANEOUS at 21:19

## 2017-02-16 RX ADMIN — INSULIN ASPART 10 UNITS: 100 INJECTION, SOLUTION INTRAVENOUS; SUBCUTANEOUS at 17:11

## 2017-02-16 RX ADMIN — INSULIN ASPART 10 UNITS: 100 INJECTION, SOLUTION INTRAVENOUS; SUBCUTANEOUS at 11:58

## 2017-02-16 NOTE — CONSULTS
CHIEF COMPLAINT/REASON FOR VISIT: panic  HPI:  Patient presented to our ED with the above complaint feeling shaky and panicky and needing help with her alcohol use.  She was initially admitted to the hospitalist service.  She was treated there for alcohol withdrawal.    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 [F10.280, F10.239]   • Alcohol use disorder, severe, dependence [F10.20]   • Alcohol withdrawal [F10.239]   • Anxiety [F41.9]   • Benign essential hypertension [I10]   • Depression [F32.9]   • Persons encountering health services in other specified circumstances [Z76.89]     Overview Note:     Last Assessment & Plan:   JUJU Urias     • Gastroesophageal reflux disease without esophagitis [K21.9]   • Type 2 diabetes mellitus [E11.9]         CURRENT MEDICATIONS:  Prescriptions Prior to Admission   Medication Sig Dispense Refill Last Dose   • multivitamin (THERAGRAN) tablet tablet Take 1 tablet by mouth.      • albuterol (PROVENTIL HFA;VENTOLIN HFA) 108 (90 BASE) MCG/ACT inhaler Inhale 2 puffs 4 (Four) Times a Day.   Past Month at Unknown time   • albuterol (VENTOLIN HFA) 108 (90 BASE) MCG/ACT inhaler Inhale 2 puffs.      • Blood Glucose Monitoring Suppl (EVENCARE GLUCOSE MONITORING) kit Use as instructed      • citalopram (CeleXA) 40 MG tablet TAKE ONE TABLET BY MOUTH DAILY 30 tablet 0 2/12/2017 at Unknown time   • citalopram (CeleXA) 40 MG tablet Take 40 mg by mouth.      • glucose blood test strip 1 strip.      • glucose blood test strip       • Lancets 28G misc 1 each.      • lisinopril (PRINIVIL,ZESTRIL) 10 MG tablet Take 10 mg by mouth.      • lisinopril (PRINIVIL,ZESTRIL) 10 MG tablet       • metFORMIN (GLUCOPHAGE) 1000 MG tablet Take 500 mg by mouth.      • metFORMIN (GLUCOPHAGE) 500 MG tablet Take 500 mg by mouth 2 (Two) Times a Day With  Meals.   2017 at Unknown time   • omeprazole (priLOSEC) 40 MG capsule TAKE ONE CAPSULE BY MOUTH DAILY 30 capsule 0 2017 at Unknown time   • promethazine (PHENERGAN) 25 MG tablet Take 25 mg by mouth Every 6 (Six) Hours As Needed for nausea or vomiting.   Unknown at Unknown time       ALLERGIES:  Ace inhibitors and Penicillins      PAST MEDICAL/SURGICAL HISTORY:  Past Medical History   Diagnosis Date   • Acute bronchitis    • Acute conjunctivitis      left   • Acute pharyngitis    • Alcohol abuse    • Allergic rhinitis due to pollen    • Anxiety state    • Asthma    • Constipation    • Depressive disorder    • Diarrhea    • GERD (gastroesophageal reflux disease)    • Herpes simplex    • Hypertensive disorder    • Insomnia    • Knee pain    • Migraine    • Nausea    • Nausea and vomiting    • Psychiatric illness    • Rib pain    • Tobacco dependence syndrome    • Type 2 diabetes mellitus      uncontrolled   • URI (upper respiratory infection)    • Viral intestinal infection, unspecified    • Wheezing        Past Surgical History   Procedure Laterality Date   • Injection of medication  2016     Kenalog   • Pap smear  2010   • Injection of medication  2015     Toradol   • Tubal abdominal ligation     •  section     • Abdominal surgery         Review of Systems   Constitutional: Negative for activity change, appetite change, fatigue and fever.   HENT: Negative for congestion, ear discharge, ear pain, facial swelling, hearing loss, nosebleeds, postnasal drip, rhinorrhea, sinus pressure, sore throat, tinnitus and trouble swallowing.    Eyes: Negative for pain, discharge and visual disturbance.   Respiratory: Negative for cough, shortness of breath and wheezing.    Cardiovascular: Negative for chest pain, palpitations and leg swelling.   Gastrointestinal: Negative for abdominal pain, blood in stool, constipation, diarrhea, nausea and vomiting.   Genitourinary: Negative for difficulty  "urinating, dyspareunia, dysuria, flank pain, frequency, hematuria, menstrual problem, pelvic pain, urgency, vaginal bleeding and vaginal discharge.   Musculoskeletal: Negative for arthralgias, back pain, joint swelling, myalgias and neck pain.   Skin: Negative for rash and wound.   Neurological: Negative for dizziness, seizures, syncope, weakness, light-headedness and headaches.   Hematological: Negative for adenopathy.   Psychiatric/Behavioral: Positive for agitation. The patient is nervous/anxious.        Social History     Social History   • Marital status: Single     Spouse name: N/A   • Number of children: 2   • Years of education: 12     Occupational History   • unemployed      Social History Main Topics   • Smoking status: Current Every Day Smoker     Packs/day: 0.25     Years: 8.00     Types: Cigarettes   • Smokeless tobacco: Never Used   • Alcohol use 2.4 oz/week     4 Shots of liquor per week      Comment: 4 day binge of 1/2 gallon of gin   • Drug use: Yes     Special: Marijuana      Comment: when was younger   • Sexual activity: No     Other Topics Concern   • Not on file     Social History Narrative    Has been arrested for robbery. Stole food to feed her children.       Family History   Problem Relation Age of Onset   • Drug abuse Mother      addicted to pain pills,  from overdose   • Suicide Attempts Mother    • Heart attack Father    • Alcohol abuse Father    • Hepatitis Sister      IV drug use   • Drug abuse Sister    • Bipolar disorder Sister    • Colon cancer Maternal Grandmother       at age 19   • Heart disease Maternal Grandfather    • Tuberculosis Paternal Grandfather    • ADD / ADHD Son              Objective     Visit Vitals   • /85 (BP Location: Right arm, Patient Position: Sitting)  Comment: pt glucose was 193 Karen Newton (RN) notified.   • Pulse 95   • Temp 97.5 °F (36.4 °C) (Tympanic)   • Resp 18   • Ht 66\" (167.6 cm)   • Wt 210 lb 4.8 oz (95.4 kg)   • LMP  " (Approximate)  Comment: 2 weeks ago   • SpO2 96%   • BMI 33.94 kg/m2       Physical Exam   Constitutional: She appears well-developed and well-nourished.   HENT:   Head: Normocephalic and atraumatic.   Eyes: Conjunctivae and EOM are normal.   Neck: Normal range of motion. Neck supple. No thyromegaly present.   Cardiovascular: Normal rate, regular rhythm and normal heart sounds.  Exam reveals no gallop and no friction rub.    No murmur heard.  Pulmonary/Chest: Effort normal and breath sounds normal. No respiratory distress. She has no wheezes. She has no rales.   Abdominal: Soft. She exhibits no distension and no mass. There is no tenderness. There is no rebound and no guarding.   Musculoskeletal: Normal range of motion.   Lymphadenopathy:     She has no cervical adenopathy.   Neurological: She is alert. She has normal strength and normal reflexes. She displays no tremor and normal reflexes. No cranial nerve deficit or sensory deficit. She exhibits normal muscle tone. Coordination normal. She displays no Babinski's sign on the right side. She displays no Babinski's sign on the left side.   Reflex Scores:       Tricep reflexes are 2+ on the right side and 2+ on the left side.       Bicep reflexes are 2+ on the right side and 2+ on the left side.       Brachioradialis reflexes are 2+ on the right side and 2+ on the left side.       Patellar reflexes are 2+ on the right side and 2+ on the left side.       Achilles reflexes are 2+ on the right side and 2+ on the left side.  Skin: Skin is warm and dry. No rash noted. No erythema.   Nursing note and vitals reviewed.      Dystonia/Tardive Dyskinesia  Absent  Meningeal Signs  Absent    Diagnostic Studies  CBC, CMP,TSH, UDS, acetaminophen level, salicylate level, ethanol level, U/A all normal except  Results from last 7 days  Lab Units 02/14/17  0641 02/13/17  0556 02/12/17 2015 02/12/17  1613   SODIUM mmol/L 134* 134* 132* 134*   POTASSIUM mmol/L 4.5 3.2* 4.1 4.2   CHLORIDE  mmol/L 102 98 94* 92*   TOTAL CO2 mmol/L 26.0 30.0 26.0 25.0   BUN mg/dL 7 5* 7 8   CREATININE mg/dL 0.55 0.51 0.51 0.57   GLUCOSE mg/dL 241* 191* 298* 266*   CALCIUM mg/dL 8.7 8.4 8.7 9.5   BILIRUBIN mg/dL --  --  --  0.7   ALK PHOS U/L --  --  --  85   ALT (SGPT) U/L --  --  --  71*   AST (SGOT) U/L --  --  --  48*   ANION GAP mmol/L 6.0 6.0 12.0 17.0*      Estimated Creatinine Clearance: 161.4 mL/min (by C-G formula based on Cr of 0.55).           Results from last 7 days  Lab Units 02/14/17  0641 02/13/17  0556 02/12/17 2015 02/12/17  1613   WBC 10*3/mm3 4.67 6.06 6.95 8.07   HEMOGLOBIN g/dL 13.1 13.0 13.5 14.2   HEMATOCRIT % 37.8 35.9 37.1 39.0   PLATELETS 10*3/mm3 156 177 184 185           UCG  CXR Nl except  EKG Nl except  Portable AP upright film of the chest was obtained at 4:13  PM.COMPARISON: None     EKG leads in place.The lungs are clear of an acute process.The  heart is not enlarged.The pulmonary vasculature is not  increased.No pleural effusion.No pneumothorax.No acute osseous  abnormality.      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  Will continue her regular medicines as well as sliding scale insulin.      Continue Home Meds as ordered. Mental health and pain issues managed per  psychiatry.  Further diagnostic studies or intervention based on hospital course.

## 2017-02-16 NOTE — PLAN OF CARE
Problem: [facility] Patient Care Overview (Adult)  Goal: Plan of Care Review  Outcome: Ongoing (interventions implemented as appropriate)    02/16/17 0529   Coping/Psychosocial Response Interventions   Plan Of Care Reviewed With patient   Coping/Psychosocial   Patient Agreement with Plan of Care agrees   Patient Care Overview   Progress no change       Goal: Individualization and Mutuality  Outcome: Ongoing (interventions implemented as appropriate)  Goal: Discharge Needs Assessment  Outcome: Ongoing (interventions implemented as appropriate)    Problem:  Overarching Goals  Goal: Adheres to Safety Considerations for Self and Others  Outcome: Ongoing (interventions implemented as appropriate)  Goal: Optimized Coping Skills in Response to Life Stressors  Outcome: Ongoing (interventions implemented as appropriate)  Goal: Develops/Participates in Therapeutic [place] to Support Successful Transition  Outcome: Ongoing (interventions implemented as appropriate)

## 2017-02-16 NOTE — NURSING NOTE
Behavior   Anxiety 0  Depression 0  Pain 0  AVH no  S/I no  H/I no    Pt has been up in the visitation area interacting with peers and staff. Pt pleasant, laughing, smiling. Pt attended group with high participation.        Intervention  Instructed in med usage and effects, encouraged to verbalize needs. Meds administered as ordered.          Response  Verbalized understanding. Pt is hopeful about her future          Plan  Continue to monitor for safety/  every 15 minute monitoring checks.

## 2017-02-16 NOTE — NURSING NOTE
"Behavior   Anxiety 3  Depression 5  Pain 0  AVH no  S/I no  H/I no   Pt. Reveals that she does not like being around people and has social anxiety. Request to take a shower after med pas. Good eye contact, calm, cooperative. Smiles with interactions. At table coloring and stated \" I haven't colored since I was a kid\". Asking questions about unit, groups & general areas. Pt. Requesting something to help her sleep.    Intervention  Instructed in med usage and effects, encouraged to verbalize needs. Meds administered as ordered. Provided answers, active listening & support.    Response  Verbalized understanding. Thanked signee for answers.    Plan  Continue to monitor for safety/  every 15 minute monitoring checks.  "

## 2017-02-16 NOTE — NURSING NOTE
"Behavior   Anxiety 0  Depression 0  Pain 0  AVH no  S/I no  H/I no    Pt interacting well with staff, pt pleasant, smiling, laughing. Pt states she slept well and feels much better this morning, stating,\"this is just what the doctor ordered, I can do this\"        Intervention  Instructed in med usage and effects, encouraged to verbalize needs. Meds administered as ordered.          Response  Verbalized understanding           Plan  Continue to monitor for safety/  every 15 minute monitoring checks.    "

## 2017-02-16 NOTE — NURSING NOTE
Dr Ferrell ROS         General  NONE    Eyes   glasses    ENT/Mouth  none    Cardio   None    Resp   None    GI    None       None    MS    None    Skin/Hair/Nails   None    Neuro   None
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
## 2017-02-16 NOTE — PLAN OF CARE
Problem: BH Patient Care Overview (Adult)  Goal: Plan of Care Review  Outcome: Ongoing (interventions implemented as appropriate)  Goal: Interdisciplinary Rounds/Family Conference  Outcome: Ongoing (interventions implemented as appropriate)    02/16/17 0932   Interdisciplinary Rounds/Family Conf   Participants advanced practice nurse;nursing;psychiatrist;social work;therapeutic recreation;other (see comments);patient       Goal: Individualization and Mutuality  Outcome: Ongoing (interventions implemented as appropriate)  Goal: Discharge Needs Assessment  Outcome: Ongoing (interventions implemented as appropriate)

## 2017-02-17 LAB
GLUCOSE BLDC GLUCOMTR-MCNC: 133 MG/DL (ref 70–130)
GLUCOSE BLDC GLUCOMTR-MCNC: 147 MG/DL (ref 70–130)
GLUCOSE BLDC GLUCOMTR-MCNC: 169 MG/DL (ref 70–130)
GLUCOSE BLDC GLUCOMTR-MCNC: 210 MG/DL (ref 70–130)
GLUCOSE BLDC GLUCOMTR-MCNC: 269 MG/DL (ref 70–130)
GLUCOSE BLDC GLUCOMTR-MCNC: 314 MG/DL (ref 70–130)
GLUCOSE BLDC GLUCOMTR-MCNC: 91 MG/DL (ref 70–130)

## 2017-02-17 PROCEDURE — 63710000001 INSULIN DETEMIR PER 5 UNITS: Performed by: PSYCHIATRY & NEUROLOGY

## 2017-02-17 PROCEDURE — 82962 GLUCOSE BLOOD TEST: CPT

## 2017-02-17 PROCEDURE — 99232 SBSQ HOSP IP/OBS MODERATE 35: CPT | Performed by: NURSE PRACTITIONER

## 2017-02-17 PROCEDURE — 63710000001 INSULIN ASPART PER 5 UNITS: Performed by: PSYCHIATRY & NEUROLOGY

## 2017-02-17 RX ADMIN — DULOXETINE HYDROCHLORIDE 30 MG: 30 CAPSULE, DELAYED RELEASE ORAL at 08:40

## 2017-02-17 RX ADMIN — DOCUSATE SODIUM 100 MG: 100 CAPSULE, LIQUID FILLED ORAL at 17:14

## 2017-02-17 RX ADMIN — NICOTINE 1 PATCH: 21 PATCH, EXTENDED RELEASE TRANSDERMAL at 08:40

## 2017-02-17 RX ADMIN — TRAZODONE HYDROCHLORIDE 50 MG: 50 TABLET ORAL at 22:18

## 2017-02-17 RX ADMIN — INSULIN ASPART 10 UNITS: 100 INJECTION, SOLUTION INTRAVENOUS; SUBCUTANEOUS at 17:13

## 2017-02-17 RX ADMIN — DOCUSATE SODIUM 100 MG: 100 CAPSULE, LIQUID FILLED ORAL at 08:40

## 2017-02-17 RX ADMIN — INSULIN ASPART 10 UNITS: 100 INJECTION, SOLUTION INTRAVENOUS; SUBCUTANEOUS at 11:19

## 2017-02-17 RX ADMIN — INSULIN ASPART 2 UNITS: 100 INJECTION, SOLUTION INTRAVENOUS; SUBCUTANEOUS at 11:19

## 2017-02-17 RX ADMIN — GLYBURIDE 5 MG: 5 TABLET ORAL at 08:41

## 2017-02-17 RX ADMIN — METFORMIN HYDROCHLORIDE 500 MG: 500 TABLET ORAL at 17:14

## 2017-02-17 RX ADMIN — INSULIN DETEMIR 15 UNITS: 100 INJECTION, SOLUTION SUBCUTANEOUS at 22:17

## 2017-02-17 RX ADMIN — METFORMIN HYDROCHLORIDE 500 MG: 500 TABLET ORAL at 08:40

## 2017-02-17 RX ADMIN — INSULIN ASPART 10 UNITS: 100 INJECTION, SOLUTION INTRAVENOUS; SUBCUTANEOUS at 08:41

## 2017-02-17 NOTE — PROGRESS NOTES
2/17/2017    Chief Complaint: Anxiety attack due to alcohol withdrawal. Paranoid psychosis    Subjective:  Patient is a 36 y.o. female who was hospitalized for Anxiety attack due to alcohol withdrawal. Paranoid psychosis.   Since yesterday the patient has been exhibiting more lisa symptoms. Continues to have poor judgment and limited insight. Patient reports medications are effective.  Further history reported: no side effects from medications, but patient has extreme labile mood. She is easily tearful and has pressured speech. She is impulsive. Resisting idea of rehab.     Objective     Vital Signs    Temp:  [97.4 °F (36.3 °C)-98.2 °F (36.8 °C)] 98.2 °F (36.8 °C)  Heart Rate:  [75-94] 75  Resp:  [18-20] 20  BP: (137-162)/() 137/75    Physical Exam:   General Appearance: alert, appears stated age, cooperative and following staff's directions,  Hygiene:   fair  Gait & Station: Normal  Musculoskeletal: No tremors or abnormal involuntary movements}    Mental Status Exam:   Cooperation:  Cooperative  Eye Contact:  Fair  Psychomotor Behavior:  Restless  Mood: labile  Affect:  tearful  Speech:  Pressured and Rapid  Thought Process:  [place]  Associations: Loose Associations  Thought Content:     Bizarre   Suicidal:  None   Homicidal:  None   Hallucinations:  None   Delusion:  Paranoid  Cognitive Functioning:   Consciousness: awake and alert, Orientation:  Person, Place, Time and Situation and Attention: distractible; Concentration: Impaired  Reliability:  fair  Insight:  Poor  Judgement:  Poor  Impulse Control:  Impaired    Lab Results (last 24 hours)     Procedure Component Value Units Date/Time    POC Glucose Fingerstick [[id number]]  (Abnormal) Collected:  02/16/17 1530    Specimen:  Blood Updated:  02/16/17 1545     Glucose 158 (H) mg/dL       RN NotifiedMeter: [id number]: [id number] FRITZ DIONNA       POC Glucose Fingerstick [[id number]]  (Abnormal) Collected:  02/16/17 2110    Specimen:  Blood  Updated:  02/16/17 2123     Glucose 277 (H) mg/dL       RN NotifiedMeter: [id number]: [id number] [facility]       POC Glucose Fingerstick [[id number]]  (Abnormal) Collected:  02/17/17 0707    Specimen:  Blood Updated:  02/17/17 0719     Glucose 147 (H) mg/dL       RN NotifiedMeter: [id number]: [id number] [facility] BENITEZ       POC Glucose Fingerstick [[id number]]  (Abnormal) Collected:  02/17/17 1059    Specimen:  Blood Updated:  02/17/17 1111     Glucose 169 (H) mg/dL       RN NotifiedMeter: [id number]: [id number] TAMARA FAUST           Imaging Results (last 24 hours)     ** No results found for the last 24 hours. **          Medicine:   Current Facility-Administered Medications:   •  acetaminophen (TYLENOL) tablet 650 mg, 650 mg, Oral, Q4H PRN, Deisy Kahn MD  •  albuterol (PROVENTIL) nebulizer solution 0.083% 2.5 mg/3mL, 2.5 mg, Nebulization, Q6H PRN, Deisy Kahn MD  •  aluminum-magnesium hydroxide-simethicone (MAALOX/MYLANTA) suspension 30 mL, 30 mL, Oral, Q6H PRN, Deisy Kahn MD  •  CloNIDine (CATAPRES) tablet 0.1 mg, 0.1 mg, Oral, Q4H PRN, Deisy Kahn MD, 0.1 mg at 02/16/17 1549  •  dextrose (GLUTOSE) oral gel 15 g, 15 g, Oral, Q15 Min PRN, Deisy Kahn MD  •  docusate sodium (COLACE) capsule 100 mg, 100 mg, Oral, BID PRN, Deisy Kahn MD  •  docusate sodium (COLACE) capsule 100 mg, 100 mg, Oral, BID, Deisy Kahn MD, 100 mg at 02/17/17 0840  •  DULoxetine (CYMBALTA) DR capsule 30 mg, 30 mg, Oral, Daily, JUJU Ralph, 30 mg at 02/17/17 0840  •  glucagon (human recombinant) (GLUCAGEN DIAGNOSTIC) injection 1 mg, 1 mg, Subcutaneous, Q15 Min PRN, Deisy Kahn MD  •  glyBURIDE (DIAbeta) tablet 5 mg, 5 mg, Oral, Daily With Breakfast, Deisy Kahn MD, 5 mg at 02/17/17 0841  •  hydrOXYzine (VISTARIL) capsule 50 mg, 50 mg, Oral, Q6H PRN, Deisy Kahn MD, 50 mg at 02/16/17 0067  •  insulin aspart (novoLOG)  injection 0-7 Units, 0-7 Units, Subcutaneous, 4x Daily AC & at Bedtime, Deisy Kahn MD, 2 Units at 02/17/17 1119  •  insulin aspart (novoLOG) injection 10 Units, 10 Units, Subcutaneous, TID With Meals, Deisy Kahn MD, 10 Units at 02/17/17 1119  •  insulin detemir (LEVEMIR) injection 15 Units, 15 Units, Subcutaneous, Nightly, Deisy Kahn MD, 15 Units at 02/16/17 2046  •  loperamide (IMODIUM) capsule 2 mg, 2 mg, Oral, 4x Daily PRN, Deisy Kahn MD  •  magnesium hydroxide (MILK OF MAGNESIA) suspension 2400 mg/10mL 10 mL, 10 mL, Oral, Daily PRN, Deisy Kahn MD  •  metFORMIN (GLUCOPHAGE) tablet 500 mg, 500 mg, Oral, BID With Meals, Deisy Kahn MD, 500 mg at 02/17/17 0840  •  nicotine (NICODERM CQ) 21 MG/24HR patch 1 patch, 1 patch, Transdermal, Q24H, Deisy Kahn MD, 1 patch at 02/17/17 0840  •  traZODone (DESYREL) tablet 50 mg, 50 mg, Oral, Nightly PRN, Deisy Kahn MD, 50 mg at 02/16/17 2045    Diagnoses/Assessment:   Active Problems:    Alcohol withdrawal    Benign essential hypertension    Depression    Type 2 diabetes mellitus    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  2) Patient will be provided treatment with the unit milieu, activities, therapies and psychopharmacological management.  3) Patient placed on Q15 minute checks and Suicide precautions.  4) Dr. Ferrell's consult consulted for management of medical co-morbidities.  5) Will order following labs: no new labs  6) Will restart patient on the following psychiatric home meds: all medication from medical floor restarted  7) Will make the following medication changes: consider Naltrexone if has cravings  8) Will begin discharge planning as appropriate for  patient-recommending outpatient rehab treatment this AM in treatment team.  9) Psychotherapy provided for less than 16 minutes.   10) treatment team met with patient today and individualize treatment plan developed with patient's input.  Treatment plan and medication risks and benefits discussed with: Patient,  and .    JUJU Ralph  02/17/17  3:26 PM

## 2017-02-17 NOTE — PLAN OF CARE
Problem: BH Overarching Goals  Goal: Optimized Coping Skills in Response to Life Stressors  Outcome: Ongoing (interventions implemented as appropriate)

## 2017-02-17 NOTE — NURSING NOTE
Behavior   Anxiety 0  Depression 0  Pain 0  AVH no  S/I no  H/I no            Intervention  Instructed in med usage and effects, encouraged to verbalize needs. Meds administered as ordered. Pt. Continues to be monitored as ordered per pt. Care plan.          Response  Verbalized understanding           Plan  Continue to monitor for safety/  every 15 minute monitoring checks. Pt. Continues to be monitored as ordered per pt. Care plan.
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
## 2017-02-17 NOTE — PLAN OF CARE
Problem: BH Patient Care Overview (Adult)  Goal: Plan of Care Review  Outcome: Ongoing (interventions implemented as appropriate)  Goal: Individualization and Mutuality  Outcome: Ongoing (interventions implemented as appropriate)  Goal: Discharge Needs Assessment  Outcome: Ongoing (interventions implemented as appropriate)    Problem: BH Overarching Goals  Goal: Adheres to Safety Considerations for Self and Others  Outcome: Ongoing (interventions implemented as appropriate)  Goal: Optimized Coping Skills in Response to Life Stressors  Outcome: Ongoing (interventions implemented as appropriate)  Goal: Develops/Participates in Therapeutic [place] to Support Successful Transition  Outcome: Ongoing (interventions implemented as appropriate)

## 2017-02-17 NOTE — SIGNIFICANT NOTE
"   02/17/17 1140   Individual Counseling   Time Session Began 1120   Time Session Ended 1140   Total Time (minutes) 20   Topic Safety/DC Plan   Session Detail CSW met with pt 1:1 and reviewed safety/dc plan. CSW recommended rehab/residential treatment for alcohol abuse. CSW scheduled outpt follow up appt with [facility] for therapy and medication management. CSW educated pt on Crisis Hotline, advised her to call as needed. BPRS was complete and pt given Press Ganey to complete and return at [place].    Patient Response Pt presented with elated mood, affect was bright. Pt stated \"something has clicked and I am ready to change.\" Pt was receptive to rehab information. Stated that she plans to continue to follow up with [facility] and attend their 28 day program when bed is available. Pt agreeable to outpt follow up at [facility]. Pt has fair insight, fair judgement. Pt participated well in individual and group tx, has been med compliant. Pt has been able to identify her major stressors/triggers. Pt does minimize her alcohol abuse and does appear to be in denial, despite her saying otherwise. CSW educated on AA/NA, Celebrate recovery and encouraged regular attendance in support groups/meetings. Pt denies  SI/HI, AVH, will return home today.      "

## 2017-02-17 NOTE — NURSING NOTE
Behavior   Anxiety 0  Depression 0  Pain 0  AVH no  S/I no  H/I no            Intervention  Instructed in med usage and effects, encouraged to verbalize needs. Meds administered as ordered. Pt. Continues to be monitored per pt. Care plan.          Response  Verbalized understanding           Plan  Continue to monitor for safety/  every 15 minute monitoring checks. Pt. Continues to be monitored per pt. Care plan.

## 2017-02-17 NOTE — PLAN OF CARE
Problem: BH Overarching Goals  Goal: Develops/Participates in Therapeutic [place] to Support Successful Transition  Outcome: Ongoing (interventions implemented as appropriate)

## 2017-02-17 NOTE — PLAN OF CARE
Problem: BH Patient Care Overview (Adult)  Goal: Plan of Care Review  Pt attended nutrition class this date with good participation and understanding.    02/16/17 1947   Coping/Psychosocial Response Interventions   Plan Of Care Reviewed With patient   Patient Care Overview   Progress no change   Outcome Evaluation   Outcome Summary/Follow up Plan initial assessment

## 2017-02-17 NOTE — CONSULTS
"Adult Nutrition  Assessment    Patient Name:  Julia Gibson  YOB: 1980  MRN: [id number]  Admit Date:  2/15/2017    Assessment Date:  2/16/2017          Reason for Assessment       02/16/17 1903    Reason for Assessment    Reason For Assessment/Visit identified at risk by screening criteria                Anthropometrics       02/16/17 1903    Anthropometrics    Height 167.6 cm (65.98\")    Weight 95.4 kg (210 lb 5.1 oz)    Ideal Body Weight (IBW)    Ideal Body Weight (IBW), Female 59.94    % Ideal Body Weight 159.49    Body Mass Index (BMI)    BMI (kg/m2) 34.03    BMI Grade 30 - 34.9- obesity - grade I            Labs/Tests/Procedures/Meds       02/16/17 1904    Labs/Tests/Procedures/Meds    Labs/Tests Review Glucose    Medication Review Diabetic Oral Agent;Insulin              Estimated/Assessed Needs       02/16/17 1904    Calculation Measurements    Weight Used For Calculations 68.1 kg (150 lb 2.1 oz)    Height Used for Calculations 1.676 m (5' 5.98\")    Estimated/Assessed Energy Needs    Energy Need Method [place]-St Jeor    Age 36    RMR (Dakota-St. Jeor Equation) 1377.5    Activity Factors ([place] St Jeor)  Out of bed, ambulatory  1.3    Total estimated needs ([place] St. Jeor) 1790    Estimated/Assessed Protein Needs    Weight Used for Protein Calculation 68.1 kg (150 lb 2.1 oz)    Estimated Protein Range 68 - 81    Estimated/Assessed Fluid Needs    Fluid Need Method --   2043            Nutrition Prescription Ordered       02/16/17 1914    Nutrition Prescription PO    Current PO Diet Regular    Common Modifiers Consistent Carbohydrate            Evaluation of Received Nutrient/Fluid Intake       02/16/17 1919    PO Evaluation    Number of Meals 11    % PO Intake 100% - 9x, 75% - 1x, 25% - 1x            Comments:  Pt reports good appetite.  Voiced no food preferences.  Reports 40 lb intentional wt loss achieved by walking, drinking water, increased intake of vegetables.  Intake 100% - 9x, " 75% - 1x, 25% - 1x.  FBS are elevated.  Glucophage, glyburide, levemir insulin, novolog insulin, sliding scale novolog prescribed.        Electronically signed by:  Marti Ellington RD  02/16/17 7:37 PM

## 2017-02-18 VITALS
RESPIRATION RATE: 20 BRPM | TEMPERATURE: 98.1 F | HEIGHT: 66 IN | OXYGEN SATURATION: 98 % | SYSTOLIC BLOOD PRESSURE: 142 MMHG | WEIGHT: 210.32 LBS | BODY MASS INDEX: 33.8 KG/M2 | DIASTOLIC BLOOD PRESSURE: 87 MMHG | HEART RATE: 53 BPM

## 2017-02-18 PROBLEM — R45.851 SUICIDAL IDEATION: Status: RESOLVED | Noted: 2017-02-15 | Resolved: 2017-02-18

## 2017-02-18 PROBLEM — F10.939 ALCOHOL WITHDRAWAL: Status: RESOLVED | Noted: 2017-02-12 | Resolved: 2017-02-18

## 2017-02-18 LAB
GLUCOSE BLDC GLUCOMTR-MCNC: 101 MG/DL (ref 70–130)
GLUCOSE BLDC GLUCOMTR-MCNC: 350 MG/DL (ref 70–130)
GLUCOSE BLDC GLUCOMTR-MCNC: 71 MG/DL (ref 70–130)

## 2017-02-18 PROCEDURE — 99238 HOSP IP/OBS DSCHRG MGMT 30/<: CPT | Performed by: NURSE PRACTITIONER

## 2017-02-18 PROCEDURE — 63710000001 INSULIN ASPART PER 5 UNITS: Performed by: PSYCHIATRY & NEUROLOGY

## 2017-02-18 PROCEDURE — 82962 GLUCOSE BLOOD TEST: CPT

## 2017-02-18 RX ORDER — TRAZODONE HYDROCHLORIDE 50 MG/1
50 TABLET ORAL NIGHTLY PRN
Qty: 30 TABLET | Refills: 0 | Status: SHIPPED | OUTPATIENT
Start: 2017-02-18 | End: 2018-02-28 | Stop reason: HOSPADM

## 2017-02-18 RX ORDER — GLYBURIDE 5 MG/1
5 TABLET ORAL
Qty: 30 TABLET | Refills: 0 | Status: SHIPPED | OUTPATIENT
Start: 2017-02-18 | End: 2018-02-28 | Stop reason: HOSPADM

## 2017-02-18 RX ORDER — NICOTINE 21 MG/24HR
1 PATCH, TRANSDERMAL 24 HOURS TRANSDERMAL EVERY 24 HOURS
Qty: 30 PATCH | Refills: 0 | Status: SHIPPED | OUTPATIENT
Start: 2017-02-18 | End: 2021-01-13

## 2017-02-18 RX ORDER — HYDROXYZINE PAMOATE 50 MG/1
50 CAPSULE ORAL EVERY 6 HOURS PRN
Qty: 90 CAPSULE | Refills: 0 | Status: SHIPPED | OUTPATIENT
Start: 2017-02-18 | End: 2017-03-09

## 2017-02-18 RX ADMIN — DOCUSATE SODIUM 100 MG: 100 CAPSULE, LIQUID FILLED ORAL at 08:27

## 2017-02-18 RX ADMIN — METFORMIN HYDROCHLORIDE 500 MG: 500 TABLET ORAL at 08:27

## 2017-02-18 RX ADMIN — DULOXETINE HYDROCHLORIDE 30 MG: 30 CAPSULE, DELAYED RELEASE ORAL at 08:27

## 2017-02-18 RX ADMIN — INSULIN ASPART 6 UNITS: 100 INJECTION, SOLUTION INTRAVENOUS; SUBCUTANEOUS at 08:30

## 2017-02-18 RX ADMIN — HYDROXYZINE PAMOATE 50 MG: 50 CAPSULE ORAL at 12:49

## 2017-02-18 RX ADMIN — INSULIN ASPART 10 UNITS: 100 INJECTION, SOLUTION INTRAVENOUS; SUBCUTANEOUS at 08:59

## 2017-02-18 RX ADMIN — GLYBURIDE 5 MG: 5 TABLET ORAL at 08:26

## 2017-02-18 NOTE — NURSING NOTE
Pt. Ambulated from [facility] to discharge home. Pt. Stable with no signs or symptoms of distress. All pt. Paperwork completed, and discussed with pt. Pt. Verbalized understanding., all paperwork signed. Personal belongings and medications returned to pt. Pt discharged home.

## 2017-02-18 NOTE — NURSING NOTE
Behavior   Anxiety 2, more anxious after   Depression0  Pain 0  AVH no  S/I no  H/I no  Pt sitting in day room with peers accu check obtained, pt with visible shaking, sweating, stated felt sugar was ok, eating snack, stated had a ok day, stated depression/anxietydoing ok,  More anxious after sugar obtained/ snack, stated felt like sugar was dropping.  Has been drinking coffee all afternoon.  Repeat sugar 91, refusing pm insulin  Intervention  Instructed in med usage and effects, encouraged to verbalize needs. Instructed in signs/symptoms  Of low blood sugar          Response  Verbalized understanding           Plan  Continue to monitor for safety/  every 15 minute monitoring checks.

## 2017-02-18 NOTE — NURSING NOTE
Behavior   Anxiety 1  Depression 0  Pain 0  AVH no  S/I no  H/I no            Intervention  Instructed in med usage and effects, encouraged to verbalize needs. Meds administered as ordered. Pt. Continues to be monitored as ordered per pt. Care plan.          Response  Verbalized understanding           Plan  Continue to monitor for safety/  every 15 minute monitoring checks. Pt. Continues to be monitored per pt. Care plan.

## 2017-02-18 NOTE — PLAN OF CARE
Problem:  Patient Care Overview (Adult)  Goal: Plan of Care Review  Outcome: Ongoing (interventions implemented as appropriate)  Goal: Interdisciplinary Rounds/Family Conference  Outcome: Ongoing (interventions implemented as appropriate)  Goal: Individualization and Mutuality  Outcome: Ongoing (interventions implemented as appropriate)  Goal: Discharge Needs Assessment  Feels may be dcd 2/18/2017    Problem:  Overarching Goals  Goal: Adheres to Safety Considerations for Self and Others  Outcome: Ongoing (interventions implemented as appropriate)  Wears non skid foot wear  Goal: Optimized Coping Skills in Response to Life Stressors  Outcome: Ongoing (interventions implemented as appropriate)  Good interaction with peers  Intervention: Promote Effective Coping Strategies  Pt attending group/good interaction with peers    Goal: Develops/Participates in Therapeutic Dane to Support Successful Transition  Outcome: Ongoing (interventions implemented as appropriate)  Attends group/good interaction with peers

## 2017-02-18 NOTE — DISCHARGE SUMMARY
"    Admission Date: 2/15/2017    Discharge Date: 2/18/2017    Psychiatric History: Patient is a 36 y.o. female who presents with substance abuse and anxiety. Onset of symptoms was starting over a year ago but became more intense 6 months when youngest son was taken from her home and placed in a group home. States she has been secluding herself and cutting herself from other. Her new roommate brought her to the ER. \"He seems like a nice deandre.\" Symptoms have been present on a intemittent basis. Symptoms are associated with anxiety and empty nest. Symptoms are aggravated by economic problems, problems with health, problems with substance abuse and alcohol use. Symptoms improve with visit with her children. Patients symptoms severity is severe.     Diagnostic Data:    Results from last 7 days  Lab Units 02/15/17  0613 02/14/17  0641 02/13/17  0556   WBC 10*3/mm3 7.59 4.67 6.06   HEMOGLOBIN g/dL 13.3 13.1 13.0   HEMATOCRIT % 38.9 37.8 35.9   PLATELETS 10*3/mm3 185 156 177     ,   Results from last 7 days  Lab Units 02/15/17  0613 02/14/17  0641 02/13/17  0556  02/12/17  1613   SODIUM mmol/L 136* 134* 134*  < > 134*   POTASSIUM mmol/L 4.1 4.5 3.2*  < > 4.2   CHLORIDE mmol/L 99 102 98  < > 92*   TOTAL CO2 mmol/L 29.0 26.0 30.0  < > 25.0   BUN mg/dL 10 7 5*  < > 8   CREATININE mg/dL 0.61 0.55 0.51  < > 0.57   CALCIUM mg/dL 9.3 8.7 8.4  < > 9.5   BILIRUBIN mg/dL  --   --   --   --  0.7   ALK PHOS U/L  --   --   --   --  85   ALT (SGPT) U/L  --   --   --   --  71*   AST (SGOT) U/L  --   --   --   --  48*   GLUCOSE mg/dL 172* 241* 191*  < > 266*   < > = values in this interval not displayed.    Summary of Hospital Course:  Patient was admitted to the behavioral health unit at [facility] to ensure patient safety.  Patient was provided treatment with the unit milieu, activities, therapies and psychopharmacological management.  Patient was placed on Q15 minute checks and Suicide.  Dr. Ferrell's consult was consulted " for management of medical co-morbidities.    No prescriptions prior to admission.   The following medication changes were made during the hospital stay: started Cymbalta.  Patient had improvement over the course of the hospital stay and tolerated medications.  Patient declined family session with sister.  Substance abuse issues were present.  Alcohol    Patients Condition at Discharge:  Patient is stable for discharge and is not an imminent threat to self or others.  The patient's behavior was Restless.  Patient reported that mood was Euphoric.  Patient's affect was bright.  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         hydrOXYzine 50 MG capsule   Commonly known as:  VISTARIL        Take 1 capsule by mouth Every 6 (Six) Hours As Needed for anxiety.         nicotine 21 MG/24HR patch   Commonly known as:  NICODERM CQ        Place 1 patch on the skin Daily.         traZODone 50 MG tablet   Commonly known as:  DESYREL        Take 1 tablet by mouth At Night As Needed for sleep (insomnia).           CONTINUE taking these medications       Instructions Last Dose Given Next Dose Due    albuterol 108 (90 BASE) MCG/ACT inhaler   Commonly known as:  PROVENTIL HFA;VENTOLIN HFA              VENTOLIN  (90 BASE) MCG/ACT inhaler   Generic drug:  albuterol              citalopram 40 MG tablet   Commonly known as:  CeleXA        TAKE ONE TABLET  BY MOUTH DAILY         citalopram 40 MG tablet   Commonly known as:  CeleXA              EVENCARE GLUCOSE MONITORING kit              glucose blood test strip              glucose blood test strip              Lancets 28G misc              lisinopril 10 MG tablet   Commonly known as:  PRINIVIL,ZESTRIL              lisinopril 10 MG tablet   Commonly known as:  PRINIVIL,ZESTRIL              metFORMIN 500 MG tablet   Commonly known as:  GLUCOPHAGE              metFORMIN 1000 MG tablet   Commonly known as:  GLUCOPHAGE              multivitamin tablet tablet              omeprazole 40 MG capsule   Commonly known as:  priLOSEC        TAKE ONE CAPSULE BY MOUTH DAILY         promethazine 25 MG tablet   Commonly known as:  PHENERGAN                   Where to Get Your Medications      These medications were sent to [facility]/pharmacy #[id number] - [place], [place] - [address] [place] [address] - [phone]  - [phone] [address] [address]     Phone:  [phone]    • glyBURIDE 5 MG tablet   • hydrOXYzine 50 MG capsule   • nicotine 21 MG/24HR patch   • traZODone 50 MG tablet             Justification for multiple antipsychotic medications at discharge:  Not Applicable.    Disposition: Patient was discharged home with self.  Psychiatric follow up will be with [facility].  Medical follow up will be with primary care physician call on Monday to make an appointment. Waiting for bed at [facility] to open.

## 2017-02-19 LAB — GLUCOSE BLDC GLUCOMTR-MCNC: 94 MG/DL (ref 70–130)

## 2017-03-09 ENCOUNTER — HOSPITAL ENCOUNTER (EMERGENCY)
Facility: HOSPITAL | Age: 37
Discharge: HOME | End: 2017-03-09
Attending: EMERGENCY MEDICINE | Admitting: NURSE PRACTITIONER

## 2017-03-09 VITALS
WEIGHT: 200 LBS | DIASTOLIC BLOOD PRESSURE: 91 MMHG | BODY MASS INDEX: 32.14 KG/M2 | RESPIRATION RATE: 18 BRPM | OXYGEN SATURATION: 96 % | TEMPERATURE: 97.6 F | SYSTOLIC BLOOD PRESSURE: 169 MMHG | HEIGHT: 66 IN | HEART RATE: 100 BPM

## 2017-03-09 DIAGNOSIS — F41.0 PANIC ATTACK: Primary | ICD-10-CM

## 2017-03-09 LAB
ALBUMIN SERPL-MCNC: 4.9 G/DL (ref 3.4–4.8)
ALBUMIN/GLOB SERPL: 1.5 G/DL (ref 1.1–1.8)
ALP SERPL-CCNC: 77 U/L (ref 38–126)
ALT SERPL W P-5'-P-CCNC: 120 U/L (ref 9–52)
AMPHET+METHAMPHET UR QL: NEGATIVE
ANION GAP SERPL CALCULATED.3IONS-SCNC: 20 MMOL/L (ref 5–15)
AST SERPL-CCNC: 54 U/L (ref 14–36)
B-HCG UR QL: NEGATIVE
BARBITURATES UR QL SCN: NEGATIVE
BASOPHILS # BLD AUTO: 0.02 10*3/MM3 (ref 0–0.2)
BASOPHILS NFR BLD AUTO: 0.3 % (ref 0–2)
BENZODIAZ UR QL SCN: NEGATIVE
BILIRUB SERPL-MCNC: 0.5 MG/DL (ref 0.2–1.3)
BILIRUB UR QL STRIP: NEGATIVE
BUN BLD-MCNC: 10 MG/DL (ref 7–21)
BUN/CREAT SERPL: 17.9 (ref 7–25)
CALCIUM SPEC-SCNC: 9.3 MG/DL (ref 8.4–10.2)
CANNABINOIDS SERPL QL: NEGATIVE
CHLORIDE SERPL-SCNC: 97 MMOL/L (ref 95–110)
CLARITY UR: CLEAR
CO2 SERPL-SCNC: 20 MMOL/L (ref 22–31)
COCAINE UR QL: NEGATIVE
COLOR UR: YELLOW
CREAT BLD-MCNC: 0.56 MG/DL (ref 0.5–1)
DEPRECATED RDW RBC AUTO: 44.2 FL (ref 36.4–46.3)
EOSINOPHIL # BLD AUTO: 0.07 10*3/MM3 (ref 0–0.7)
EOSINOPHIL NFR BLD AUTO: 0.9 % (ref 0–7)
ERYTHROCYTE [DISTWIDTH] IN BLOOD BY AUTOMATED COUNT: 12.9 % (ref 11.5–14.5)
ETHANOL BLD-MCNC: 27 MG/DL (ref 0–10)
ETHANOL UR QL: 0.03 %
GFR SERPL CREATININE-BSD FRML MDRD: 121 ML/MIN/1.73 (ref 64–149)
GLOBULIN UR ELPH-MCNC: 3.3 GM/DL (ref 2.3–3.5)
GLUCOSE BLD-MCNC: 234 MG/DL (ref 60–100)
GLUCOSE BLDC GLUCOMTR-MCNC: 226 MG/DL (ref 70–130)
GLUCOSE UR STRIP-MCNC: ABNORMAL MG/DL
HCT VFR BLD AUTO: 38.4 % (ref 35–45)
HGB BLD-MCNC: 14 G/DL (ref 12–15.5)
HGB UR QL STRIP.AUTO: NEGATIVE
HOLD SPECIMEN: NORMAL
IMM GRANULOCYTES # BLD: 0.02 10*3/MM3 (ref 0–0.02)
IMM GRANULOCYTES NFR BLD: 0.3 % (ref 0–0.5)
KETONES UR QL STRIP: ABNORMAL
LEUKOCYTE ESTERASE UR QL STRIP.AUTO: NEGATIVE
LYMPHOCYTES # BLD AUTO: 1.75 10*3/MM3 (ref 0.6–4.2)
LYMPHOCYTES NFR BLD AUTO: 22.2 % (ref 10–50)
MCH RBC QN AUTO: 34.2 PG (ref 26.5–34)
MCHC RBC AUTO-ENTMCNC: 36.5 G/DL (ref 31.4–36)
MCV RBC AUTO: 93.9 FL (ref 80–98)
METHADONE UR QL SCN: NEGATIVE
MONOCYTES # BLD AUTO: 0.58 10*3/MM3 (ref 0–0.9)
MONOCYTES NFR BLD AUTO: 7.4 % (ref 0–12)
NEUTROPHILS # BLD AUTO: 5.45 10*3/MM3 (ref 2–8.6)
NEUTROPHILS NFR BLD AUTO: 68.9 % (ref 37–80)
NITRITE UR QL STRIP: NEGATIVE
OPIATES UR QL: NEGATIVE
OXYCODONE UR QL SCN: NEGATIVE
PH UR STRIP.AUTO: 6 [PH] (ref 5–9)
PLATELET # BLD AUTO: 189 10*3/MM3 (ref 150–450)
PMV BLD AUTO: 9.8 FL (ref 8–12)
POTASSIUM BLD-SCNC: 4.1 MMOL/L (ref 3.5–5.1)
PROT SERPL-MCNC: 8.2 G/DL (ref 6.3–8.6)
PROT UR QL STRIP: ABNORMAL
RBC # BLD AUTO: 4.09 10*6/MM3 (ref 3.77–5.16)
SODIUM BLD-SCNC: 137 MMOL/L (ref 137–145)
SP GR UR STRIP: 1.02 (ref 1–1.03)
UROBILINOGEN UR QL STRIP: ABNORMAL
WBC NRBC COR # BLD: 7.89 10*3/MM3 (ref 3.2–9.8)
WHOLE BLOOD HOLD SPECIMEN: NORMAL
WHOLE BLOOD HOLD SPECIMEN: NORMAL

## 2017-03-09 PROCEDURE — 81025 URINE PREGNANCY TEST: CPT | Performed by: NURSE PRACTITIONER

## 2017-03-09 PROCEDURE — 80053 COMPREHEN METABOLIC PANEL: CPT | Performed by: NURSE PRACTITIONER

## 2017-03-09 PROCEDURE — 80307 DRUG TEST PRSMV CHEM ANLYZR: CPT | Performed by: NURSE PRACTITIONER

## 2017-03-09 PROCEDURE — 85025 COMPLETE CBC W/AUTO DIFF WBC: CPT | Performed by: NURSE PRACTITIONER

## 2017-03-09 PROCEDURE — 96361 HYDRATE IV INFUSION ADD-ON: CPT

## 2017-03-09 PROCEDURE — 96360 HYDRATION IV INFUSION INIT: CPT

## 2017-03-09 PROCEDURE — 99283 EMERGENCY DEPT VISIT LOW MDM: CPT

## 2017-03-09 PROCEDURE — 81003 URINALYSIS AUTO W/O SCOPE: CPT | Performed by: NURSE PRACTITIONER

## 2017-03-09 PROCEDURE — 83036 HEMOGLOBIN GLYCOSYLATED A1C: CPT | Performed by: NURSE PRACTITIONER

## 2017-03-09 PROCEDURE — 82962 GLUCOSE BLOOD TEST: CPT

## 2017-03-09 RX ORDER — HYDROXYZINE PAMOATE 50 MG/1
50 CAPSULE ORAL EVERY 6 HOURS PRN
Qty: 90 CAPSULE | Refills: 0 | Status: SHIPPED | OUTPATIENT
Start: 2017-03-09 | End: 2018-02-28 | Stop reason: HOSPADM

## 2017-03-09 RX ORDER — SODIUM CHLORIDE 0.9 % (FLUSH) 0.9 %
10 SYRINGE (ML) INJECTION AS NEEDED
Status: DISCONTINUED | OUTPATIENT
Start: 2017-03-09 | End: 2017-03-09 | Stop reason: HOSPADM

## 2017-03-09 RX ORDER — BLOOD-GLUCOSE METER
EACH MISCELLANEOUS
Refills: 0 | COMMUNITY
Start: 2016-12-09

## 2017-03-09 RX ADMIN — Medication 10 ML: at 13:24

## 2017-03-09 RX ADMIN — SODIUM CHLORIDE 1000 ML: 9 INJECTION, SOLUTION INTRAVENOUS at 13:24
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
## 2017-03-10 LAB — HBA1C MFR BLD: 9.39 % (ref 4–5.6)

## 2018-01-28 ENCOUNTER — HOSPITAL ENCOUNTER (INPATIENT)
Facility: HOSPITAL | Age: 38
LOS: 2 days | Discharge: HOME | End: 2018-01-30
Attending: EMERGENCY MEDICINE | Admitting: INTERNAL MEDICINE

## 2018-01-28 ENCOUNTER — APPOINTMENT (OUTPATIENT)
Dept: CT IMAGING | Facility: HOSPITAL | Age: 38
End: 2018-01-28

## 2018-01-28 ENCOUNTER — APPOINTMENT (OUTPATIENT)
Dept: GENERAL RADIOLOGY | Facility: HOSPITAL | Age: 38
End: 2018-01-28

## 2018-01-28 DIAGNOSIS — T74.91XA DOMESTIC VIOLENCE OF ADULT, INITIAL ENCOUNTER: ICD-10-CM

## 2018-01-28 DIAGNOSIS — F10.930 ALCOHOL WITHDRAWAL SYNDROME WITHOUT COMPLICATION (HCC): Primary | ICD-10-CM

## 2018-01-28 LAB
ALBUMIN SERPL-MCNC: 4.5 G/DL (ref 3.4–4.8)
ALBUMIN/GLOB SERPL: 1.5 G/DL (ref 1.1–1.8)
ALP SERPL-CCNC: 85 U/L (ref 38–126)
ALT SERPL W P-5'-P-CCNC: 101 U/L (ref 9–52)
AMPHET+METHAMPHET UR QL: NEGATIVE
ANION GAP SERPL CALCULATED.3IONS-SCNC: 13 MMOL/L (ref 5–15)
APAP SERPL-MCNC: <10 MCG/ML (ref 10–30)
APTT PPP: 28.5 SECONDS (ref 20–40.3)
AST SERPL-CCNC: 56 U/L (ref 14–36)
B-HCG UR QL: NEGATIVE
BACTERIA UR QL AUTO: ABNORMAL /HPF
BARBITURATES UR QL SCN: NEGATIVE
BASOPHILS # BLD AUTO: 0.02 10*3/MM3 (ref 0–0.2)
BASOPHILS NFR BLD AUTO: 0.2 % (ref 0–2)
BENZODIAZ UR QL SCN: NEGATIVE
BILIRUB SERPL-MCNC: 0.4 MG/DL (ref 0.2–1.3)
BILIRUB UR QL STRIP: NEGATIVE
BUN BLD-MCNC: 13 MG/DL (ref 7–21)
BUN/CREAT SERPL: 22 (ref 7–25)
CALCIUM SPEC-SCNC: 9.6 MG/DL (ref 8.4–10.2)
CANNABINOIDS SERPL QL: NEGATIVE
CHLORIDE SERPL-SCNC: 96 MMOL/L (ref 95–110)
CLARITY UR: ABNORMAL
CO2 SERPL-SCNC: 23 MMOL/L (ref 22–31)
COCAINE UR QL: NEGATIVE
COLOR UR: YELLOW
CREAT BLD-MCNC: 0.59 MG/DL (ref 0.5–1)
DEPRECATED RDW RBC AUTO: 42.3 FL (ref 36.4–46.3)
EOSINOPHIL # BLD AUTO: 0.05 10*3/MM3 (ref 0–0.7)
EOSINOPHIL NFR BLD AUTO: 0.6 % (ref 0–7)
ERYTHROCYTE [DISTWIDTH] IN BLOOD BY AUTOMATED COUNT: 12.2 % (ref 11.5–14.5)
ETHANOL BLD-MCNC: <10 MG/DL (ref 0–10)
ETHANOL UR QL: <0.01 %
GFR SERPL CREATININE-BSD FRML MDRD: 113 ML/MIN/1.73 (ref 64–149)
GLOBULIN UR ELPH-MCNC: 3.1 GM/DL (ref 2.3–3.5)
GLUCOSE BLD-MCNC: 217 MG/DL (ref 60–100)
GLUCOSE BLDC GLUCOMTR-MCNC: 225 MG/DL (ref 70–130)
GLUCOSE UR STRIP-MCNC: NEGATIVE MG/DL
HCT VFR BLD AUTO: 39.8 % (ref 35–45)
HGB BLD-MCNC: 14.4 G/DL (ref 12–15.5)
HGB UR QL STRIP.AUTO: ABNORMAL
HOLD SPECIMEN: NORMAL
HOLD SPECIMEN: NORMAL
HYALINE CASTS UR QL AUTO: ABNORMAL /LPF
IMM GRANULOCYTES # BLD: 0.03 10*3/MM3 (ref 0–0.02)
IMM GRANULOCYTES NFR BLD: 0.3 % (ref 0–0.5)
INR PPP: 0.98 (ref 0.8–1.2)
KETONES UR QL STRIP: ABNORMAL
LEUKOCYTE ESTERASE UR QL STRIP.AUTO: ABNORMAL
LIPASE SERPL-CCNC: 115 U/L (ref 23–300)
LYMPHOCYTES # BLD AUTO: 2.39 10*3/MM3 (ref 0.6–4.2)
LYMPHOCYTES NFR BLD AUTO: 26.9 % (ref 10–50)
MCH RBC QN AUTO: 34.6 PG (ref 26.5–34)
MCHC RBC AUTO-ENTMCNC: 36.2 G/DL (ref 31.4–36)
MCV RBC AUTO: 95.7 FL (ref 80–98)
METHADONE UR QL SCN: NEGATIVE
MONOCYTES # BLD AUTO: 0.58 10*3/MM3 (ref 0–0.9)
MONOCYTES NFR BLD AUTO: 6.5 % (ref 0–12)
NEUTROPHILS # BLD AUTO: 5.82 10*3/MM3 (ref 2–8.6)
NEUTROPHILS NFR BLD AUTO: 65.5 % (ref 37–80)
NITRITE UR QL STRIP: NEGATIVE
OPIATES UR QL: NEGATIVE
OXYCODONE UR QL SCN: NEGATIVE
PH UR STRIP.AUTO: 6 [PH] (ref 5–9)
PLATELET # BLD AUTO: 254 10*3/MM3 (ref 150–450)
PMV BLD AUTO: 9.9 FL (ref 8–12)
POTASSIUM BLD-SCNC: 4.2 MMOL/L (ref 3.5–5.1)
PROT SERPL-MCNC: 7.6 G/DL (ref 6.3–8.6)
PROT UR QL STRIP: ABNORMAL
PROTHROMBIN TIME: 12.9 SECONDS (ref 11.1–15.3)
RBC # BLD AUTO: 4.16 10*6/MM3 (ref 3.77–5.16)
RBC # UR: ABNORMAL /HPF
REF LAB TEST METHOD: ABNORMAL
SALICYLATES SERPL-MCNC: <1 MG/DL (ref 10–20)
SODIUM BLD-SCNC: 132 MMOL/L (ref 137–145)
SP GR UR STRIP: 1.02 (ref 1–1.03)
SQUAMOUS #/AREA URNS HPF: ABNORMAL /HPF
UROBILINOGEN UR QL STRIP: ABNORMAL
WBC NRBC COR # BLD: 8.89 10*3/MM3 (ref 3.2–9.8)
WBC UR QL AUTO: ABNORMAL /HPF
WHOLE BLOOD HOLD SPECIMEN: NORMAL
WHOLE BLOOD HOLD SPECIMEN: NORMAL

## 2018-01-28 PROCEDURE — 80307 DRUG TEST PRSMV CHEM ANLYZR: CPT | Performed by: EMERGENCY MEDICINE

## 2018-01-28 PROCEDURE — 70450 CT HEAD/BRAIN W/O DYE: CPT

## 2018-01-28 PROCEDURE — 25010000002 LORAZEPAM PER 2 MG: Performed by: EMERGENCY MEDICINE

## 2018-01-28 PROCEDURE — 25010000002 HYDRALAZINE PER 20 MG: Performed by: EMERGENCY MEDICINE

## 2018-01-28 PROCEDURE — 81001 URINALYSIS AUTO W/SCOPE: CPT | Performed by: EMERGENCY MEDICINE

## 2018-01-28 PROCEDURE — 85610 PROTHROMBIN TIME: CPT | Performed by: EMERGENCY MEDICINE

## 2018-01-28 PROCEDURE — 99284 EMERGENCY DEPT VISIT MOD MDM: CPT

## 2018-01-28 PROCEDURE — 63710000001 INSULIN ASPART PER 5 UNITS: Performed by: INTERNAL MEDICINE

## 2018-01-28 PROCEDURE — 83690 ASSAY OF LIPASE: CPT | Performed by: EMERGENCY MEDICINE

## 2018-01-28 PROCEDURE — 25010000002 HEPARIN (PORCINE) PER 1000 UNITS: Performed by: INTERNAL MEDICINE

## 2018-01-28 PROCEDURE — 94760 N-INVAS EAR/PLS OXIMETRY 1: CPT

## 2018-01-28 PROCEDURE — 80053 COMPREHEN METABOLIC PANEL: CPT | Performed by: EMERGENCY MEDICINE

## 2018-01-28 PROCEDURE — 87086 URINE CULTURE/COLONY COUNT: CPT | Performed by: EMERGENCY MEDICINE

## 2018-01-28 PROCEDURE — 85025 COMPLETE CBC W/AUTO DIFF WBC: CPT | Performed by: EMERGENCY MEDICINE

## 2018-01-28 PROCEDURE — 72125 CT NECK SPINE W/O DYE: CPT

## 2018-01-28 PROCEDURE — 25010000002 THIAMINE PER 100 MG: Performed by: EMERGENCY MEDICINE

## 2018-01-28 PROCEDURE — 71045 X-RAY EXAM CHEST 1 VIEW: CPT

## 2018-01-28 PROCEDURE — 93010 ELECTROCARDIOGRAM REPORT: CPT | Performed by: INTERNAL MEDICINE

## 2018-01-28 PROCEDURE — 85730 THROMBOPLASTIN TIME PARTIAL: CPT | Performed by: EMERGENCY MEDICINE

## 2018-01-28 PROCEDURE — 83735 ASSAY OF MAGNESIUM: CPT | Performed by: INTERNAL MEDICINE

## 2018-01-28 PROCEDURE — 25010000002 MAGNESIUM SULFATE PER 500 MG OF MAGNESIUM: Performed by: EMERGENCY MEDICINE

## 2018-01-28 PROCEDURE — 94640 AIRWAY INHALATION TREATMENT: CPT

## 2018-01-28 PROCEDURE — 82962 GLUCOSE BLOOD TEST: CPT

## 2018-01-28 PROCEDURE — 93005 ELECTROCARDIOGRAM TRACING: CPT | Performed by: EMERGENCY MEDICINE

## 2018-01-28 PROCEDURE — 81025 URINE PREGNANCY TEST: CPT | Performed by: EMERGENCY MEDICINE

## 2018-01-28 RX ORDER — DOCUSATE SODIUM 100 MG/1
200 CAPSULE, LIQUID FILLED ORAL 2 TIMES DAILY
Status: DISCONTINUED | OUTPATIENT
Start: 2018-01-28 | End: 2018-01-30 | Stop reason: HOSPADM

## 2018-01-28 RX ORDER — ONDANSETRON 2 MG/ML
4 INJECTION INTRAMUSCULAR; INTRAVENOUS EVERY 6 HOURS PRN
Status: DISCONTINUED | OUTPATIENT
Start: 2018-01-28 | End: 2018-01-30 | Stop reason: HOSPADM

## 2018-01-28 RX ORDER — HYDROXYZINE PAMOATE 50 MG/1
50 CAPSULE ORAL EVERY 6 HOURS PRN
Status: DISCONTINUED | OUTPATIENT
Start: 2018-01-28 | End: 2018-01-30 | Stop reason: HOSPADM

## 2018-01-28 RX ORDER — NICOTINE 21 MG/24HR
1 PATCH, TRANSDERMAL 24 HOURS TRANSDERMAL EVERY 24 HOURS
Status: DISCONTINUED | OUTPATIENT
Start: 2018-01-28 | End: 2018-01-28

## 2018-01-28 RX ORDER — BISACODYL 10 MG
10 SUPPOSITORY, RECTAL RECTAL DAILY PRN
Status: DISCONTINUED | OUTPATIENT
Start: 2018-01-28 | End: 2018-01-30 | Stop reason: HOSPADM

## 2018-01-28 RX ORDER — TRAZODONE HYDROCHLORIDE 50 MG/1
50 TABLET ORAL NIGHTLY PRN
Status: DISCONTINUED | OUTPATIENT
Start: 2018-01-28 | End: 2018-01-30 | Stop reason: HOSPADM

## 2018-01-28 RX ORDER — PANTOPRAZOLE SODIUM 40 MG/1
40 TABLET, DELAYED RELEASE ORAL EVERY MORNING
Status: DISCONTINUED | OUTPATIENT
Start: 2018-01-29 | End: 2018-01-30 | Stop reason: HOSPADM

## 2018-01-28 RX ORDER — ACETAMINOPHEN 325 MG/1
650 TABLET ORAL EVERY 4 HOURS PRN
Status: DISCONTINUED | OUTPATIENT
Start: 2018-01-28 | End: 2018-01-30 | Stop reason: HOSPADM

## 2018-01-28 RX ORDER — LORAZEPAM 1 MG/1
1 TABLET ORAL ONCE
Status: COMPLETED | OUTPATIENT
Start: 2018-01-28 | End: 2018-01-28

## 2018-01-28 RX ORDER — ONDANSETRON 4 MG/1
4 TABLET, FILM COATED ORAL EVERY 6 HOURS PRN
Status: DISCONTINUED | OUTPATIENT
Start: 2018-01-28 | End: 2018-01-30 | Stop reason: HOSPADM

## 2018-01-28 RX ORDER — NICOTINE POLACRILEX 4 MG
15 LOZENGE BUCCAL
Status: DISCONTINUED | OUTPATIENT
Start: 2018-01-28 | End: 2018-01-30 | Stop reason: HOSPADM

## 2018-01-28 RX ORDER — SODIUM CHLORIDE 0.9 % (FLUSH) 0.9 %
1-10 SYRINGE (ML) INJECTION AS NEEDED
Status: DISCONTINUED | OUTPATIENT
Start: 2018-01-28 | End: 2018-01-30 | Stop reason: HOSPADM

## 2018-01-28 RX ORDER — ONDANSETRON 4 MG/1
4 TABLET, ORALLY DISINTEGRATING ORAL EVERY 6 HOURS PRN
Status: DISCONTINUED | OUTPATIENT
Start: 2018-01-28 | End: 2018-01-30 | Stop reason: HOSPADM

## 2018-01-28 RX ORDER — NICOTINE 21 MG/24HR
1 PATCH, TRANSDERMAL 24 HOURS TRANSDERMAL EVERY 24 HOURS
Status: DISCONTINUED | OUTPATIENT
Start: 2018-01-28 | End: 2018-01-29 | Stop reason: SDUPTHER

## 2018-01-28 RX ORDER — LISINOPRIL 10 MG/1
10 TABLET ORAL
Status: DISCONTINUED | OUTPATIENT
Start: 2018-01-29 | End: 2018-01-30 | Stop reason: HOSPADM

## 2018-01-28 RX ORDER — GLIPIZIDE 5 MG/1
5 TABLET ORAL
Status: DISCONTINUED | OUTPATIENT
Start: 2018-01-29 | End: 2018-01-30 | Stop reason: HOSPADM

## 2018-01-28 RX ORDER — CITALOPRAM 40 MG/1
40 TABLET ORAL DAILY
Status: DISCONTINUED | OUTPATIENT
Start: 2018-01-29 | End: 2018-01-30 | Stop reason: HOSPADM

## 2018-01-28 RX ORDER — LORAZEPAM 1 MG/1
1 TABLET ORAL EVERY 4 HOURS PRN
Status: DISCONTINUED | OUTPATIENT
Start: 2018-01-28 | End: 2018-01-30 | Stop reason: HOSPADM

## 2018-01-28 RX ORDER — LABETALOL HYDROCHLORIDE 5 MG/ML
10 INJECTION, SOLUTION INTRAVENOUS ONCE
Status: COMPLETED | OUTPATIENT
Start: 2018-01-28 | End: 2018-01-28

## 2018-01-28 RX ORDER — HYDRALAZINE HYDROCHLORIDE 20 MG/ML
10 INJECTION INTRAMUSCULAR; INTRAVENOUS ONCE
Status: COMPLETED | OUTPATIENT
Start: 2018-01-28 | End: 2018-01-28

## 2018-01-28 RX ORDER — LORAZEPAM 2 MG/ML
1 INJECTION INTRAMUSCULAR ONCE
Status: COMPLETED | OUTPATIENT
Start: 2018-01-28 | End: 2018-01-28

## 2018-01-28 RX ORDER — SODIUM CHLORIDE 0.9 % (FLUSH) 0.9 %
10 SYRINGE (ML) INJECTION AS NEEDED
Status: DISCONTINUED | OUTPATIENT
Start: 2018-01-28 | End: 2018-01-30 | Stop reason: HOSPADM

## 2018-01-28 RX ORDER — HEPARIN SODIUM 5000 [USP'U]/ML
5000 INJECTION, SOLUTION INTRAVENOUS; SUBCUTANEOUS EVERY 8 HOURS SCHEDULED
Status: DISCONTINUED | OUTPATIENT
Start: 2018-01-28 | End: 2018-01-30 | Stop reason: HOSPADM

## 2018-01-28 RX ORDER — ALBUTEROL SULFATE 2.5 MG/3ML
2.5 SOLUTION RESPIRATORY (INHALATION)
Status: DISCONTINUED | OUTPATIENT
Start: 2018-01-28 | End: 2018-01-30 | Stop reason: HOSPADM

## 2018-01-28 RX ORDER — PROMETHAZINE HYDROCHLORIDE 25 MG/1
25 TABLET ORAL EVERY 6 HOURS PRN
Status: DISCONTINUED | OUTPATIENT
Start: 2018-01-28 | End: 2018-01-30 | Stop reason: HOSPADM

## 2018-01-28 RX ORDER — DEXTROSE MONOHYDRATE 25 G/50ML
25 INJECTION, SOLUTION INTRAVENOUS
Status: DISCONTINUED | OUTPATIENT
Start: 2018-01-28 | End: 2018-01-30 | Stop reason: HOSPADM

## 2018-01-28 RX ORDER — HYDRALAZINE HYDROCHLORIDE 20 MG/ML
10 INJECTION INTRAMUSCULAR; INTRAVENOUS EVERY 4 HOURS PRN
Status: DISCONTINUED | OUTPATIENT
Start: 2018-01-28 | End: 2018-01-30 | Stop reason: HOSPADM

## 2018-01-28 RX ORDER — SODIUM CHLORIDE 9 MG/ML
100 INJECTION, SOLUTION INTRAVENOUS CONTINUOUS
Status: DISCONTINUED | OUTPATIENT
Start: 2018-01-28 | End: 2018-01-30 | Stop reason: HOSPADM

## 2018-01-28 RX ADMIN — LABETALOL HYDROCHLORIDE 10 MG: 5 INJECTION, SOLUTION INTRAVENOUS at 22:08

## 2018-01-28 RX ADMIN — FOLIC ACID 1000 ML/HR: 5 INJECTION, SOLUTION INTRAMUSCULAR; INTRAVENOUS; SUBCUTANEOUS at 20:05

## 2018-01-28 RX ADMIN — HYDRALAZINE HYDROCHLORIDE 10 MG: 20 INJECTION INTRAMUSCULAR; INTRAVENOUS at 20:04

## 2018-01-28 RX ADMIN — LORAZEPAM 1 MG: 1 TABLET ORAL at 20:03

## 2018-01-28 RX ADMIN — INSULIN ASPART 5 UNITS: 100 INJECTION, SOLUTION INTRAVENOUS; SUBCUTANEOUS at 23:33

## 2018-01-28 RX ADMIN — ALBUTEROL SULFATE 2.5 MG: 2.5 SOLUTION RESPIRATORY (INHALATION) at 23:17

## 2018-01-28 RX ADMIN — METFORMIN HYDROCHLORIDE 500 MG: 500 TABLET ORAL at 23:27

## 2018-01-28 RX ADMIN — HEPARIN SODIUM 5000 UNITS: 5000 INJECTION, SOLUTION INTRAVENOUS; SUBCUTANEOUS at 23:26

## 2018-01-28 RX ADMIN — LORAZEPAM 1 MG: 2 INJECTION INTRAMUSCULAR; INTRAVENOUS at 23:26

## 2018-01-28 RX ADMIN — LORAZEPAM 1 MG: 2 INJECTION INTRAMUSCULAR; INTRAVENOUS at 21:29

## 2018-01-28 RX ADMIN — SODIUM CHLORIDE 100 ML/HR: 900 INJECTION, SOLUTION INTRAVENOUS at 23:27

## 2018-01-29 PROBLEM — E11.9 DIABETES MELLITUS: Chronic | Status: ACTIVE | Noted: 2018-01-29

## 2018-01-29 PROBLEM — R31.9 HEMATURIA: Chronic | Status: ACTIVE | Noted: 2018-01-29

## 2018-01-29 PROBLEM — R31.9 HEMATURIA: Status: ACTIVE | Noted: 2018-01-29

## 2018-01-29 PROBLEM — F10.20 ALCOHOL USE DISORDER, SEVERE, DEPENDENCE (HCC): Chronic | Status: ACTIVE | Noted: 2017-02-13

## 2018-01-29 LAB
ALBUMIN SERPL-MCNC: 3.6 G/DL (ref 3.4–4.8)
ALBUMIN/GLOB SERPL: 1.2 G/DL (ref 1.1–1.8)
ALP SERPL-CCNC: 65 U/L (ref 38–126)
ALT SERPL W P-5'-P-CCNC: 73 U/L (ref 9–52)
ANION GAP SERPL CALCULATED.3IONS-SCNC: 11 MMOL/L (ref 5–15)
AST SERPL-CCNC: 36 U/L (ref 14–36)
BASOPHILS # BLD AUTO: 0.01 10*3/MM3 (ref 0–0.2)
BASOPHILS NFR BLD AUTO: 0.2 % (ref 0–2)
BILIRUB SERPL-MCNC: 0.6 MG/DL (ref 0.2–1.3)
BUN BLD-MCNC: 11 MG/DL (ref 7–21)
BUN/CREAT SERPL: 19 (ref 7–25)
CALCIUM SPEC-SCNC: 8.1 MG/DL (ref 8.4–10.2)
CHLORIDE SERPL-SCNC: 98 MMOL/L (ref 95–110)
CO2 SERPL-SCNC: 24 MMOL/L (ref 22–31)
CREAT BLD-MCNC: 0.58 MG/DL (ref 0.5–1)
DEPRECATED RDW RBC AUTO: 44.8 FL (ref 36.4–46.3)
EOSINOPHIL # BLD AUTO: 0.07 10*3/MM3 (ref 0–0.7)
EOSINOPHIL NFR BLD AUTO: 1.2 % (ref 0–7)
ERYTHROCYTE [DISTWIDTH] IN BLOOD BY AUTOMATED COUNT: 12.4 % (ref 11.5–14.5)
GFR SERPL CREATININE-BSD FRML MDRD: 116 ML/MIN/1.73 (ref 60–149)
GLOBULIN UR ELPH-MCNC: 3 GM/DL (ref 2.3–3.5)
GLUCOSE BLD-MCNC: 212 MG/DL (ref 60–100)
GLUCOSE BLDC GLUCOMTR-MCNC: 152 MG/DL (ref 70–130)
GLUCOSE BLDC GLUCOMTR-MCNC: 183 MG/DL (ref 70–130)
GLUCOSE BLDC GLUCOMTR-MCNC: 201 MG/DL (ref 70–130)
GLUCOSE BLDC GLUCOMTR-MCNC: 221 MG/DL (ref 70–130)
HCT VFR BLD AUTO: 36.6 % (ref 35–45)
HGB BLD-MCNC: 13 G/DL (ref 12–15.5)
IMM GRANULOCYTES # BLD: 0 10*3/MM3 (ref 0–0.02)
IMM GRANULOCYTES NFR BLD: 0 % (ref 0–0.5)
LYMPHOCYTES # BLD AUTO: 1.77 10*3/MM3 (ref 0.6–4.2)
LYMPHOCYTES NFR BLD AUTO: 31.1 % (ref 10–50)
MAGNESIUM SERPL-MCNC: 1.3 MG/DL (ref 1.6–2.3)
MCH RBC QN AUTO: 34.9 PG (ref 26.5–34)
MCHC RBC AUTO-ENTMCNC: 35.5 G/DL (ref 31.4–36)
MCV RBC AUTO: 98.1 FL (ref 80–98)
MONOCYTES # BLD AUTO: 0.64 10*3/MM3 (ref 0–0.9)
MONOCYTES NFR BLD AUTO: 11.2 % (ref 0–12)
NEUTROPHILS # BLD AUTO: 3.21 10*3/MM3 (ref 2–8.6)
NEUTROPHILS NFR BLD AUTO: 56.3 % (ref 37–80)
NRBC BLD MANUAL-RTO: 0 /100 WBC (ref 0–0)
PLATELET # BLD AUTO: 216 10*3/MM3 (ref 150–450)
PMV BLD AUTO: 8.9 FL (ref 8–12)
POTASSIUM BLD-SCNC: 3.5 MMOL/L (ref 3.5–5.1)
PROT SERPL-MCNC: 6.6 G/DL (ref 6.3–8.6)
RBC # BLD AUTO: 3.73 10*6/MM3 (ref 3.77–5.16)
SODIUM BLD-SCNC: 133 MMOL/L (ref 137–145)
WBC NRBC COR # BLD: 5.7 10*3/MM3 (ref 3.2–9.8)

## 2018-01-29 PROCEDURE — 63710000001 INSULIN ASPART PER 5 UNITS: Performed by: INTERNAL MEDICINE

## 2018-01-29 PROCEDURE — 82962 GLUCOSE BLOOD TEST: CPT

## 2018-01-29 PROCEDURE — 94760 N-INVAS EAR/PLS OXIMETRY 1: CPT

## 2018-01-29 PROCEDURE — 25010000002 THIAMINE PER 100 MG: Performed by: INTERNAL MEDICINE

## 2018-01-29 PROCEDURE — 94799 UNLISTED PULMONARY SVC/PX: CPT

## 2018-01-29 PROCEDURE — 80053 COMPREHEN METABOLIC PANEL: CPT | Performed by: INTERNAL MEDICINE

## 2018-01-29 PROCEDURE — 85025 COMPLETE CBC W/AUTO DIFF WBC: CPT | Performed by: INTERNAL MEDICINE

## 2018-01-29 PROCEDURE — 25010000002 HEPARIN (PORCINE) PER 1000 UNITS: Performed by: INTERNAL MEDICINE

## 2018-01-29 PROCEDURE — 25010000002 HYDRALAZINE PER 20 MG: Performed by: INTERNAL MEDICINE

## 2018-01-29 PROCEDURE — 25010000002 MAGNESIUM SULFATE PER 500 MG OF MAGNESIUM: Performed by: INTERNAL MEDICINE

## 2018-01-29 RX ORDER — LORAZEPAM 1 MG/1
1 TABLET ORAL EVERY 8 HOURS
Status: DISCONTINUED | OUTPATIENT
Start: 2018-01-30 | End: 2018-01-30 | Stop reason: HOSPADM

## 2018-01-29 RX ORDER — NICOTINE 21 MG/24HR
1 PATCH, TRANSDERMAL 24 HOURS TRANSDERMAL EVERY 24 HOURS
Status: DISCONTINUED | OUTPATIENT
Start: 2018-01-29 | End: 2018-01-30 | Stop reason: HOSPADM

## 2018-01-29 RX ORDER — ONDANSETRON 4 MG/1
4 TABLET, FILM COATED ORAL EVERY 6 HOURS PRN
Status: DISCONTINUED | OUTPATIENT
Start: 2018-01-29 | End: 2018-01-30 | Stop reason: HOSPADM

## 2018-01-29 RX ORDER — LORAZEPAM 1 MG/1
1 TABLET ORAL EVERY 6 HOURS
Status: COMPLETED | OUTPATIENT
Start: 2018-01-29 | End: 2018-01-30

## 2018-01-29 RX ORDER — ONDANSETRON 2 MG/ML
4 INJECTION INTRAMUSCULAR; INTRAVENOUS EVERY 6 HOURS PRN
Status: DISCONTINUED | OUTPATIENT
Start: 2018-01-29 | End: 2018-01-30 | Stop reason: HOSPADM

## 2018-01-29 RX ORDER — ONDANSETRON 4 MG/1
4 TABLET, ORALLY DISINTEGRATING ORAL EVERY 6 HOURS PRN
Status: DISCONTINUED | OUTPATIENT
Start: 2018-01-29 | End: 2018-01-30 | Stop reason: HOSPADM

## 2018-01-29 RX ADMIN — PANTOPRAZOLE SODIUM 40 MG: 40 TABLET, DELAYED RELEASE ORAL at 06:28

## 2018-01-29 RX ADMIN — ALBUTEROL SULFATE 2.5 MG: 2.5 SOLUTION RESPIRATORY (INHALATION) at 14:58

## 2018-01-29 RX ADMIN — HEPARIN SODIUM 5000 UNITS: 5000 INJECTION, SOLUTION INTRAVENOUS; SUBCUTANEOUS at 06:28

## 2018-01-29 RX ADMIN — INSULIN ASPART 5 UNITS: 100 INJECTION, SOLUTION INTRAVENOUS; SUBCUTANEOUS at 12:40

## 2018-01-29 RX ADMIN — CITALOPRAM HYDROBROMIDE 40 MG: 40 TABLET ORAL at 08:09

## 2018-01-29 RX ADMIN — HEPARIN SODIUM 5000 UNITS: 5000 INJECTION, SOLUTION INTRAVENOUS; SUBCUTANEOUS at 21:26

## 2018-01-29 RX ADMIN — LORAZEPAM 1 MG: 1 TABLET ORAL at 08:08

## 2018-01-29 RX ADMIN — LORAZEPAM 1 MG: 1 TABLET ORAL at 21:25

## 2018-01-29 RX ADMIN — INSULIN ASPART 3 UNITS: 100 INJECTION, SOLUTION INTRAVENOUS; SUBCUTANEOUS at 21:26

## 2018-01-29 RX ADMIN — INSULIN ASPART 5 UNITS: 100 INJECTION, SOLUTION INTRAVENOUS; SUBCUTANEOUS at 08:13

## 2018-01-29 RX ADMIN — GLIPIZIDE 5 MG: 5 TABLET ORAL at 08:08

## 2018-01-29 RX ADMIN — HYDRALAZINE HYDROCHLORIDE 10 MG: 20 INJECTION INTRAMUSCULAR; INTRAVENOUS at 22:18

## 2018-01-29 RX ADMIN — ALBUTEROL SULFATE 2.5 MG: 2.5 SOLUTION RESPIRATORY (INHALATION) at 08:52

## 2018-01-29 RX ADMIN — METFORMIN HYDROCHLORIDE 500 MG: 500 TABLET ORAL at 08:08

## 2018-01-29 RX ADMIN — ALBUTEROL SULFATE 2.5 MG: 2.5 SOLUTION RESPIRATORY (INHALATION) at 20:41

## 2018-01-29 RX ADMIN — LORAZEPAM 1 MG: 1 TABLET ORAL at 15:05

## 2018-01-29 RX ADMIN — ALBUTEROL SULFATE 2.5 MG: 2.5 SOLUTION RESPIRATORY (INHALATION) at 11:32

## 2018-01-29 RX ADMIN — METFORMIN HYDROCHLORIDE 500 MG: 500 TABLET ORAL at 17:16

## 2018-01-29 RX ADMIN — FOLIC ACID 1000 ML/HR: 5 INJECTION, SOLUTION INTRAMUSCULAR; INTRAVENOUS; SUBCUTANEOUS at 08:07

## 2018-01-29 RX ADMIN — NICOTINE 1 PATCH: 21 PATCH TRANSDERMAL at 08:09

## 2018-01-29 RX ADMIN — LORAZEPAM 1 MG: 1 TABLET ORAL at 06:28

## 2018-01-29 RX ADMIN — INSULIN ASPART 3 UNITS: 100 INJECTION, SOLUTION INTRAVENOUS; SUBCUTANEOUS at 17:14

## 2018-01-30 VITALS
SYSTOLIC BLOOD PRESSURE: 138 MMHG | DIASTOLIC BLOOD PRESSURE: 86 MMHG | RESPIRATION RATE: 16 BRPM | TEMPERATURE: 97.9 F | HEIGHT: 66 IN | OXYGEN SATURATION: 98 % | HEART RATE: 87 BPM | BODY MASS INDEX: 33.25 KG/M2 | WEIGHT: 206.9 LBS

## 2018-01-30 LAB
ALBUMIN SERPL-MCNC: 3.9 G/DL (ref 3.4–4.8)
ALBUMIN/GLOB SERPL: 1.2 G/DL (ref 1.1–1.8)
ALP SERPL-CCNC: 66 U/L (ref 38–126)
ALT SERPL W P-5'-P-CCNC: 76 U/L (ref 9–52)
ANION GAP SERPL CALCULATED.3IONS-SCNC: 9 MMOL/L (ref 5–15)
AST SERPL-CCNC: 56 U/L (ref 14–36)
BACTERIA SPEC AEROBE CULT: NORMAL
BACTERIA SPEC AEROBE CULT: NORMAL
BASOPHILS # BLD AUTO: 0.01 10*3/MM3 (ref 0–0.2)
BASOPHILS NFR BLD AUTO: 0.2 % (ref 0–2)
BILIRUB SERPL-MCNC: 0.5 MG/DL (ref 0.2–1.3)
BUN BLD-MCNC: 8 MG/DL (ref 7–21)
BUN/CREAT SERPL: 14 (ref 7–25)
CALCIUM SPEC-SCNC: 8.6 MG/DL (ref 8.4–10.2)
CHLORIDE SERPL-SCNC: 101 MMOL/L (ref 95–110)
CO2 SERPL-SCNC: 25 MMOL/L (ref 22–31)
CREAT BLD-MCNC: 0.57 MG/DL (ref 0.5–1)
DEPRECATED RDW RBC AUTO: 41.7 FL (ref 36.4–46.3)
EOSINOPHIL # BLD AUTO: 0.15 10*3/MM3 (ref 0–0.7)
EOSINOPHIL NFR BLD AUTO: 3 % (ref 0–7)
ERYTHROCYTE [DISTWIDTH] IN BLOOD BY AUTOMATED COUNT: 12 % (ref 11.5–14.5)
GFR SERPL CREATININE-BSD FRML MDRD: 118 ML/MIN/1.73 (ref 60–149)
GLOBULIN UR ELPH-MCNC: 3.2 GM/DL (ref 2.3–3.5)
GLUCOSE BLD-MCNC: 174 MG/DL (ref 60–100)
GLUCOSE BLDC GLUCOMTR-MCNC: 170 MG/DL (ref 70–130)
GLUCOSE BLDC GLUCOMTR-MCNC: 178 MG/DL (ref 70–130)
HCT VFR BLD AUTO: 37 % (ref 35–45)
HGB BLD-MCNC: 13.3 G/DL (ref 12–15.5)
IMM GRANULOCYTES # BLD: 0.01 10*3/MM3 (ref 0–0.02)
IMM GRANULOCYTES NFR BLD: 0.2 % (ref 0–0.5)
LYMPHOCYTES # BLD AUTO: 1.81 10*3/MM3 (ref 0.6–4.2)
LYMPHOCYTES NFR BLD AUTO: 36.8 % (ref 10–50)
MAGNESIUM SERPL-MCNC: 1.8 MG/DL (ref 1.6–2.3)
MCH RBC QN AUTO: 34.5 PG (ref 26.5–34)
MCHC RBC AUTO-ENTMCNC: 35.9 G/DL (ref 31.4–36)
MCV RBC AUTO: 95.9 FL (ref 80–98)
MONOCYTES # BLD AUTO: 0.44 10*3/MM3 (ref 0–0.9)
MONOCYTES NFR BLD AUTO: 8.9 % (ref 0–12)
NEUTROPHILS # BLD AUTO: 2.5 10*3/MM3 (ref 2–8.6)
NEUTROPHILS NFR BLD AUTO: 50.9 % (ref 37–80)
PLATELET # BLD AUTO: 177 10*3/MM3 (ref 150–450)
PMV BLD AUTO: 9.6 FL (ref 8–12)
POTASSIUM BLD-SCNC: 3.8 MMOL/L (ref 3.5–5.1)
PROT SERPL-MCNC: 7.1 G/DL (ref 6.3–8.6)
RBC # BLD AUTO: 3.86 10*6/MM3 (ref 3.77–5.16)
SODIUM BLD-SCNC: 135 MMOL/L (ref 137–145)
WBC NRBC COR # BLD: 4.92 10*3/MM3 (ref 3.2–9.8)

## 2018-01-30 PROCEDURE — 80053 COMPREHEN METABOLIC PANEL: CPT | Performed by: INTERNAL MEDICINE

## 2018-01-30 PROCEDURE — 25010000002 THIAMINE PER 100 MG: Performed by: INTERNAL MEDICINE

## 2018-01-30 PROCEDURE — 82962 GLUCOSE BLOOD TEST: CPT

## 2018-01-30 PROCEDURE — 85025 COMPLETE CBC W/AUTO DIFF WBC: CPT | Performed by: INTERNAL MEDICINE

## 2018-01-30 PROCEDURE — 94799 UNLISTED PULMONARY SVC/PX: CPT

## 2018-01-30 PROCEDURE — 83735 ASSAY OF MAGNESIUM: CPT | Performed by: INTERNAL MEDICINE

## 2018-01-30 PROCEDURE — 94760 N-INVAS EAR/PLS OXIMETRY 1: CPT

## 2018-01-30 PROCEDURE — 63710000001 INSULIN ASPART PER 5 UNITS: Performed by: INTERNAL MEDICINE

## 2018-01-30 PROCEDURE — 25010000002 MAGNESIUM SULFATE PER 500 MG OF MAGNESIUM: Performed by: INTERNAL MEDICINE

## 2018-01-30 PROCEDURE — 25010000002 HEPARIN (PORCINE) PER 1000 UNITS: Performed by: INTERNAL MEDICINE

## 2018-01-30 RX ORDER — ACETAMINOPHEN 325 MG/1
650 TABLET ORAL EVERY 4 HOURS PRN
Start: 2018-01-30 | End: 2018-02-28 | Stop reason: HOSPADM

## 2018-01-30 RX ORDER — LORAZEPAM 1 MG/1
0.5 TABLET ORAL EVERY 8 HOURS PRN
Qty: 1 TABLET | Refills: 0 | Status: SHIPPED | OUTPATIENT
Start: 2018-01-30 | End: 2018-02-06

## 2018-01-30 RX ADMIN — ALBUTEROL SULFATE 2.5 MG: 2.5 SOLUTION RESPIRATORY (INHALATION) at 06:45

## 2018-01-30 RX ADMIN — LORAZEPAM 1 MG: 1 TABLET ORAL at 02:01

## 2018-01-30 RX ADMIN — LORAZEPAM 1 MG: 1 TABLET ORAL at 08:19

## 2018-01-30 RX ADMIN — ALBUTEROL SULFATE 2.5 MG: 2.5 SOLUTION RESPIRATORY (INHALATION) at 10:25

## 2018-01-30 RX ADMIN — GLIPIZIDE 5 MG: 5 TABLET ORAL at 08:20

## 2018-01-30 RX ADMIN — METFORMIN HYDROCHLORIDE 500 MG: 500 TABLET ORAL at 08:19

## 2018-01-30 RX ADMIN — HEPARIN SODIUM 5000 UNITS: 5000 INJECTION, SOLUTION INTRAVENOUS; SUBCUTANEOUS at 06:18

## 2018-01-30 RX ADMIN — CITALOPRAM HYDROBROMIDE 40 MG: 40 TABLET ORAL at 08:19

## 2018-01-30 RX ADMIN — FOLIC ACID 1000 ML/HR: 5 INJECTION, SOLUTION INTRAMUSCULAR; INTRAVENOUS; SUBCUTANEOUS at 08:32

## 2018-01-30 RX ADMIN — PANTOPRAZOLE SODIUM 40 MG: 40 TABLET, DELAYED RELEASE ORAL at 06:18

## 2018-01-30 RX ADMIN — NICOTINE 1 PATCH: 21 PATCH TRANSDERMAL at 08:39

## 2018-01-30 RX ADMIN — INSULIN ASPART 2 UNITS: 100 INJECTION, SOLUTION INTRAVENOUS; SUBCUTANEOUS at 08:22
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
## 2018-02-27 ENCOUNTER — APPOINTMENT (OUTPATIENT)
Dept: GENERAL RADIOLOGY | Facility: HOSPITAL | Age: 38
End: 2018-02-27

## 2018-02-27 ENCOUNTER — APPOINTMENT (OUTPATIENT)
Dept: CT IMAGING | Facility: HOSPITAL | Age: 38
End: 2018-02-27

## 2018-02-27 ENCOUNTER — HOSPITAL ENCOUNTER (OUTPATIENT)
Facility: HOSPITAL | Age: 38
Setting detail: OBSERVATION
Discharge: PSYCHIATRIC OR SUBSTANCE USE FACILITY | End: 2018-02-28
Attending: EMERGENCY MEDICINE | Admitting: FAMILY MEDICINE

## 2018-02-27 ENCOUNTER — APPOINTMENT (OUTPATIENT)
Dept: ULTRASOUND IMAGING | Facility: HOSPITAL | Age: 38
End: 2018-02-27

## 2018-02-27 DIAGNOSIS — F10.920 ALCOHOLIC INTOXICATION WITHOUT COMPLICATION (HCC): Primary | ICD-10-CM

## 2018-02-27 DIAGNOSIS — R45.89 SUICIDAL BEHAVIOR WITHOUT ATTEMPTED SELF-INJURY: ICD-10-CM

## 2018-02-27 DIAGNOSIS — S92.403D: ICD-10-CM

## 2018-02-27 LAB
ALBUMIN SERPL-MCNC: 4.3 G/DL (ref 3.4–4.8)
ALBUMIN/GLOB SERPL: 1.3 G/DL (ref 1.1–1.8)
ALP SERPL-CCNC: 96 U/L (ref 38–126)
ALT SERPL W P-5'-P-CCNC: 109 U/L (ref 9–52)
AMPHET+METHAMPHET UR QL: NEGATIVE
ANION GAP SERPL CALCULATED.3IONS-SCNC: 18 MMOL/L (ref 5–15)
ANION GAP SERPL CALCULATED.3IONS-SCNC: 18 MMOL/L (ref 5–15)
APAP SERPL-MCNC: <10 MCG/ML (ref 10–30)
AST SERPL-CCNC: 124 U/L (ref 14–36)
BARBITURATES UR QL SCN: NEGATIVE
BASOPHILS # BLD AUTO: 0.02 10*3/MM3 (ref 0–0.2)
BASOPHILS # BLD AUTO: 0.02 10*3/MM3 (ref 0–0.2)
BASOPHILS NFR BLD AUTO: 0.2 % (ref 0–2)
BASOPHILS NFR BLD AUTO: 0.3 % (ref 0–2)
BENZODIAZ UR QL SCN: NEGATIVE
BILIRUB SERPL-MCNC: 0.2 MG/DL (ref 0.2–1.3)
BUN BLD-MCNC: 7 MG/DL (ref 7–21)
BUN BLD-MCNC: 7 MG/DL (ref 7–21)
BUN/CREAT SERPL: 11.3 (ref 7–25)
BUN/CREAT SERPL: 11.9 (ref 7–25)
CALCIUM SPEC-SCNC: 8.9 MG/DL (ref 8.4–10.2)
CALCIUM SPEC-SCNC: 8.9 MG/DL (ref 8.4–10.2)
CANNABINOIDS SERPL QL: NEGATIVE
CHLORIDE SERPL-SCNC: 104 MMOL/L (ref 95–110)
CHLORIDE SERPL-SCNC: 105 MMOL/L (ref 95–110)
CO2 SERPL-SCNC: 20 MMOL/L (ref 22–31)
CO2 SERPL-SCNC: 21 MMOL/L (ref 22–31)
COCAINE UR QL: NEGATIVE
CREAT BLD-MCNC: 0.59 MG/DL (ref 0.5–1)
CREAT BLD-MCNC: 0.62 MG/DL (ref 0.5–1)
DEPRECATED RDW RBC AUTO: 42.6 FL (ref 36.4–46.3)
DEPRECATED RDW RBC AUTO: 43.1 FL (ref 36.4–46.3)
EOSINOPHIL # BLD AUTO: 0.23 10*3/MM3 (ref 0–0.7)
EOSINOPHIL # BLD AUTO: 0.25 10*3/MM3 (ref 0–0.7)
EOSINOPHIL NFR BLD AUTO: 3.1 % (ref 0–7)
EOSINOPHIL NFR BLD AUTO: 3.7 % (ref 0–7)
ERYTHROCYTE [DISTWIDTH] IN BLOOD BY AUTOMATED COUNT: 12.1 % (ref 11.5–14.5)
ERYTHROCYTE [DISTWIDTH] IN BLOOD BY AUTOMATED COUNT: 12.1 % (ref 11.5–14.5)
ETHANOL BLD-MCNC: 264 MG/DL (ref 0–10)
ETHANOL BLD-MCNC: <10 MG/DL (ref 0–10)
ETHANOL UR QL: 0.26 %
ETHANOL UR QL: <0.01 %
GFR SERPL CREATININE-BSD FRML MDRD: 107 ML/MIN/1.73 (ref 64–149)
GFR SERPL CREATININE-BSD FRML MDRD: 113 ML/MIN/1.73 (ref 64–149)
GLOBULIN UR ELPH-MCNC: 3.4 GM/DL (ref 2.3–3.5)
GLUCOSE BLD-MCNC: 327 MG/DL (ref 60–100)
GLUCOSE BLD-MCNC: 348 MG/DL (ref 60–100)
GLUCOSE BLDC GLUCOMTR-MCNC: 271 MG/DL (ref 70–130)
GLUCOSE BLDC GLUCOMTR-MCNC: 373 MG/DL (ref 70–130)
HCG SERPL QL: NEGATIVE
HCT VFR BLD AUTO: 38.2 % (ref 35–45)
HCT VFR BLD AUTO: 40 % (ref 35–45)
HGB BLD-MCNC: 13.5 G/DL (ref 12–15.5)
HGB BLD-MCNC: 14.4 G/DL (ref 12–15.5)
HOLD SPECIMEN: NORMAL
IMM GRANULOCYTES # BLD: 0.01 10*3/MM3 (ref 0–0.02)
IMM GRANULOCYTES # BLD: 0.02 10*3/MM3 (ref 0–0.02)
IMM GRANULOCYTES NFR BLD: 0.2 % (ref 0–0.5)
IMM GRANULOCYTES NFR BLD: 0.2 % (ref 0–0.5)
LYMPHOCYTES # BLD AUTO: 2.24 10*3/MM3 (ref 0.6–4.2)
LYMPHOCYTES # BLD AUTO: 2.45 10*3/MM3 (ref 0.6–4.2)
LYMPHOCYTES NFR BLD AUTO: 30 % (ref 10–50)
LYMPHOCYTES NFR BLD AUTO: 35.6 % (ref 10–50)
MCH RBC QN AUTO: 34.4 PG (ref 26.5–34)
MCH RBC QN AUTO: 35 PG (ref 26.5–34)
MCHC RBC AUTO-ENTMCNC: 35.3 G/DL (ref 31.4–36)
MCHC RBC AUTO-ENTMCNC: 36 G/DL (ref 31.4–36)
MCV RBC AUTO: 97.1 FL (ref 80–98)
MCV RBC AUTO: 97.2 FL (ref 80–98)
METHADONE UR QL SCN: NEGATIVE
MONOCYTES # BLD AUTO: 0.44 10*3/MM3 (ref 0–0.9)
MONOCYTES # BLD AUTO: 0.52 10*3/MM3 (ref 0–0.9)
MONOCYTES NFR BLD AUTO: 6.4 % (ref 0–12)
MONOCYTES NFR BLD AUTO: 7 % (ref 0–12)
NEUTROPHILS # BLD AUTO: 3.36 10*3/MM3 (ref 2–8.6)
NEUTROPHILS # BLD AUTO: 4.92 10*3/MM3 (ref 2–8.6)
NEUTROPHILS NFR BLD AUTO: 53.2 % (ref 37–80)
NEUTROPHILS NFR BLD AUTO: 60.1 % (ref 37–80)
OPIATES UR QL: NEGATIVE
OXYCODONE UR QL SCN: NEGATIVE
PLATELET # BLD AUTO: 219 10*3/MM3 (ref 150–450)
PLATELET # BLD AUTO: 222 10*3/MM3 (ref 150–450)
PMV BLD AUTO: 10.4 FL (ref 8–12)
PMV BLD AUTO: 10.7 FL (ref 8–12)
POTASSIUM BLD-SCNC: 4.2 MMOL/L (ref 3.5–5.1)
POTASSIUM BLD-SCNC: 4.4 MMOL/L (ref 3.5–5.1)
PROT SERPL-MCNC: 7.7 G/DL (ref 6.3–8.6)
RBC # BLD AUTO: 3.93 10*6/MM3 (ref 3.77–5.16)
RBC # BLD AUTO: 4.12 10*6/MM3 (ref 3.77–5.16)
SALICYLATES SERPL-MCNC: <1 MG/DL (ref 10–20)
SODIUM BLD-SCNC: 143 MMOL/L (ref 137–145)
SODIUM BLD-SCNC: 143 MMOL/L (ref 137–145)
WBC NRBC COR # BLD: 6.3 10*3/MM3 (ref 3.2–9.8)
WBC NRBC COR # BLD: 8.18 10*3/MM3 (ref 3.2–9.8)
WHOLE BLOOD HOLD SPECIMEN: NORMAL

## 2018-02-27 PROCEDURE — 82962 GLUCOSE BLOOD TEST: CPT

## 2018-02-27 PROCEDURE — 80053 COMPREHEN METABOLIC PANEL: CPT | Performed by: EMERGENCY MEDICINE

## 2018-02-27 PROCEDURE — 84703 CHORIONIC GONADOTROPIN ASSAY: CPT | Performed by: EMERGENCY MEDICINE

## 2018-02-27 PROCEDURE — 80307 DRUG TEST PRSMV CHEM ANLYZR: CPT | Performed by: EMERGENCY MEDICINE

## 2018-02-27 PROCEDURE — 99243 OFF/OP CNSLTJ NEW/EST LOW 30: CPT | Performed by: PSYCHIATRY & NEUROLOGY

## 2018-02-27 PROCEDURE — G0378 HOSPITAL OBSERVATION PER HR: HCPCS

## 2018-02-27 PROCEDURE — 85025 COMPLETE CBC W/AUTO DIFF WBC: CPT | Performed by: FAMILY MEDICINE

## 2018-02-27 PROCEDURE — 25010000002 THIAMINE PER 100 MG: Performed by: FAMILY MEDICINE

## 2018-02-27 PROCEDURE — 70450 CT HEAD/BRAIN W/O DYE: CPT

## 2018-02-27 PROCEDURE — 63710000001 INSULIN DETEMIR PER 5 UNITS: Performed by: FAMILY MEDICINE

## 2018-02-27 PROCEDURE — 28490 TREAT BIG TOE FRACTURE: CPT | Performed by: ORTHOPAEDIC SURGERY

## 2018-02-27 PROCEDURE — 73660 X-RAY EXAM OF TOE(S): CPT

## 2018-02-27 PROCEDURE — 73030 X-RAY EXAM OF SHOULDER: CPT

## 2018-02-27 PROCEDURE — 25010000002 MAGNESIUM SULFATE PER 500 MG OF MAGNESIUM: Performed by: FAMILY MEDICINE

## 2018-02-27 PROCEDURE — 25010000002 CEFTRIAXONE PER 250 MG: Performed by: PHYSICIAN ASSISTANT

## 2018-02-27 PROCEDURE — 80307 DRUG TEST PRSMV CHEM ANLYZR: CPT | Performed by: PHYSICIAN ASSISTANT

## 2018-02-27 PROCEDURE — 85025 COMPLETE CBC W/AUTO DIFF WBC: CPT | Performed by: EMERGENCY MEDICINE

## 2018-02-27 PROCEDURE — 96365 THER/PROPH/DIAG IV INF INIT: CPT

## 2018-02-27 PROCEDURE — 96361 HYDRATE IV INFUSION ADD-ON: CPT

## 2018-02-27 PROCEDURE — 72050 X-RAY EXAM NECK SPINE 4/5VWS: CPT

## 2018-02-27 PROCEDURE — 76705 ECHO EXAM OF ABDOMEN: CPT

## 2018-02-27 PROCEDURE — 99284 EMERGENCY DEPT VISIT MOD MDM: CPT

## 2018-02-27 PROCEDURE — 63710000001 INSULIN ASPART PER 5 UNITS: Performed by: FAMILY MEDICINE

## 2018-02-27 RX ORDER — GLIPIZIDE 10 MG/1
10 TABLET ORAL
Status: DISCONTINUED | OUTPATIENT
Start: 2018-02-28 | End: 2018-02-28 | Stop reason: HOSPADM

## 2018-02-27 RX ORDER — DEXTROSE MONOHYDRATE 25 G/50ML
25 INJECTION, SOLUTION INTRAVENOUS
Status: DISCONTINUED | OUTPATIENT
Start: 2018-02-27 | End: 2018-02-28 | Stop reason: HOSPADM

## 2018-02-27 RX ORDER — CITALOPRAM 40 MG/1
40 TABLET ORAL DAILY
Status: DISCONTINUED | OUTPATIENT
Start: 2018-02-27 | End: 2018-02-28 | Stop reason: HOSPADM

## 2018-02-27 RX ORDER — NICOTINE POLACRILEX 4 MG
15 LOZENGE BUCCAL
Status: DISCONTINUED | OUTPATIENT
Start: 2018-02-27 | End: 2018-02-28 | Stop reason: HOSPADM

## 2018-02-27 RX ORDER — PANTOPRAZOLE SODIUM 40 MG/1
40 TABLET, DELAYED RELEASE ORAL EVERY MORNING
Status: DISCONTINUED | OUTPATIENT
Start: 2018-02-28 | End: 2018-02-28 | Stop reason: HOSPADM

## 2018-02-27 RX ORDER — LISINOPRIL 10 MG/1
10 TABLET ORAL
Status: DISCONTINUED | OUTPATIENT
Start: 2018-02-27 | End: 2018-02-28 | Stop reason: HOSPADM

## 2018-02-27 RX ORDER — SODIUM CHLORIDE 0.9 % (FLUSH) 0.9 %
10 SYRINGE (ML) INJECTION AS NEEDED
Status: DISCONTINUED | OUTPATIENT
Start: 2018-02-27 | End: 2018-02-28 | Stop reason: HOSPADM

## 2018-02-27 RX ORDER — ONDANSETRON 2 MG/ML
4 INJECTION INTRAMUSCULAR; INTRAVENOUS EVERY 6 HOURS PRN
Status: DISCONTINUED | OUTPATIENT
Start: 2018-02-27 | End: 2018-02-28 | Stop reason: HOSPADM

## 2018-02-27 RX ORDER — LORAZEPAM 2 MG/ML
1 INJECTION INTRAMUSCULAR
Status: DISCONTINUED | OUTPATIENT
Start: 2018-02-27 | End: 2018-02-28 | Stop reason: HOSPADM

## 2018-02-27 RX ORDER — SODIUM CHLORIDE 0.9 % (FLUSH) 0.9 %
1-10 SYRINGE (ML) INJECTION AS NEEDED
Status: DISCONTINUED | OUTPATIENT
Start: 2018-02-27 | End: 2018-02-28 | Stop reason: HOSPADM

## 2018-02-27 RX ORDER — LORAZEPAM 2 MG/ML
2 INJECTION INTRAMUSCULAR
Status: DISCONTINUED | OUTPATIENT
Start: 2018-02-27 | End: 2018-02-28 | Stop reason: HOSPADM

## 2018-02-27 RX ORDER — NICOTINE 21 MG/24HR
1 PATCH, TRANSDERMAL 24 HOURS TRANSDERMAL EVERY 24 HOURS
Status: DISCONTINUED | OUTPATIENT
Start: 2018-02-27 | End: 2018-02-28 | Stop reason: HOSPADM

## 2018-02-27 RX ORDER — SODIUM CHLORIDE 9 MG/ML
100 INJECTION, SOLUTION INTRAVENOUS CONTINUOUS
Status: DISCONTINUED | OUTPATIENT
Start: 2018-02-27 | End: 2018-02-28 | Stop reason: HOSPADM

## 2018-02-27 RX ORDER — LORAZEPAM 1 MG/1
1 TABLET ORAL
Status: DISCONTINUED | OUTPATIENT
Start: 2018-02-27 | End: 2018-02-28 | Stop reason: HOSPADM

## 2018-02-27 RX ORDER — LORAZEPAM 2 MG/1
2 TABLET ORAL
Status: DISCONTINUED | OUTPATIENT
Start: 2018-02-27 | End: 2018-02-28 | Stop reason: HOSPADM

## 2018-02-27 RX ADMIN — NICOTINE 1 PATCH: 21 PATCH, EXTENDED RELEASE TRANSDERMAL at 04:51

## 2018-02-27 RX ADMIN — CEFTRIAXONE SODIUM 1 G: 1 INJECTION, POWDER, FOR SOLUTION INTRAMUSCULAR; INTRAVENOUS at 16:05

## 2018-02-27 RX ADMIN — INSULIN ASPART 8 UNITS: 100 INJECTION, SOLUTION INTRAVENOUS; SUBCUTANEOUS at 21:53

## 2018-02-27 RX ADMIN — INSULIN DETEMIR 4 UNITS: 100 INJECTION, SOLUTION SUBCUTANEOUS at 07:16

## 2018-02-27 RX ADMIN — INSULIN ASPART 2 UNITS: 100 INJECTION, SOLUTION INTRAVENOUS; SUBCUTANEOUS at 11:14

## 2018-02-27 RX ADMIN — CITALOPRAM HYDROBROMIDE 40 MG: 40 TABLET ORAL at 18:31

## 2018-02-27 RX ADMIN — INSULIN ASPART 6 UNITS: 100 INJECTION, SOLUTION INTRAVENOUS; SUBCUTANEOUS at 18:29

## 2018-02-27 RX ADMIN — INSULIN ASPART 6 UNITS: 100 INJECTION, SOLUTION INTRAVENOUS; SUBCUTANEOUS at 08:08

## 2018-02-27 RX ADMIN — METFORMIN HYDROCHLORIDE 1000 MG: 500 TABLET ORAL at 21:45

## 2018-02-27 RX ADMIN — INSULIN ASPART 4 UNITS: 100 INJECTION, SOLUTION INTRAVENOUS; SUBCUTANEOUS at 08:08

## 2018-02-27 RX ADMIN — INSULIN DETEMIR 10 UNITS: 100 INJECTION, SOLUTION SUBCUTANEOUS at 21:46

## 2018-02-27 RX ADMIN — LISINOPRIL 10 MG: 10 TABLET ORAL at 16:06

## 2018-02-27 RX ADMIN — SODIUM CHLORIDE 500 ML: 9 INJECTION, SOLUTION INTRAVENOUS at 04:52

## 2018-02-27 RX ADMIN — Medication 10 ML: at 04:52

## 2018-02-27 RX ADMIN — MAGNESIUM SULFATE HEPTAHYDRATE 100 ML/HR: 500 INJECTION, SOLUTION INTRAMUSCULAR; INTRAVENOUS at 10:02

## 2018-02-28 ENCOUNTER — HOSPITAL ENCOUNTER (INPATIENT)
Facility: HOSPITAL | Age: 38
LOS: 2 days | Discharge: HOME | End: 2018-03-02
Attending: PSYCHIATRY & NEUROLOGY | Admitting: PSYCHIATRY & NEUROLOGY

## 2018-02-28 VITALS
TEMPERATURE: 97.4 F | DIASTOLIC BLOOD PRESSURE: 72 MMHG | HEIGHT: 66 IN | WEIGHT: 200 LBS | BODY MASS INDEX: 32.14 KG/M2 | HEART RATE: 83 BPM | SYSTOLIC BLOOD PRESSURE: 148 MMHG | OXYGEN SATURATION: 93 % | RESPIRATION RATE: 18 BRPM

## 2018-02-28 LAB
ANION GAP SERPL CALCULATED.3IONS-SCNC: 10 MMOL/L (ref 5–15)
BASOPHILS # BLD AUTO: 0.01 10*3/MM3 (ref 0–0.2)
BASOPHILS NFR BLD AUTO: 0.2 % (ref 0–2)
BUN BLD-MCNC: 8 MG/DL (ref 7–21)
BUN/CREAT SERPL: 14 (ref 7–25)
CALCIUM SPEC-SCNC: 8.4 MG/DL (ref 8.4–10.2)
CHLORIDE SERPL-SCNC: 102 MMOL/L (ref 95–110)
CO2 SERPL-SCNC: 27 MMOL/L (ref 22–31)
CREAT BLD-MCNC: 0.57 MG/DL (ref 0.5–1)
DEPRECATED RDW RBC AUTO: 41.2 FL (ref 36.4–46.3)
EOSINOPHIL # BLD AUTO: 0.23 10*3/MM3 (ref 0–0.7)
EOSINOPHIL NFR BLD AUTO: 3.9 % (ref 0–7)
ERYTHROCYTE [DISTWIDTH] IN BLOOD BY AUTOMATED COUNT: 11.8 % (ref 11.5–14.5)
GFR SERPL CREATININE-BSD FRML MDRD: 118 ML/MIN/1.73 (ref 60–149)
GLUCOSE BLD-MCNC: 116 MG/DL (ref 60–100)
GLUCOSE BLDC GLUCOMTR-MCNC: 120 MG/DL (ref 70–130)
GLUCOSE BLDC GLUCOMTR-MCNC: 245 MG/DL (ref 70–130)
GLUCOSE BLDC GLUCOMTR-MCNC: 259 MG/DL (ref 70–130)
HCT VFR BLD AUTO: 38.3 % (ref 35–45)
HGB BLD-MCNC: 13.6 G/DL (ref 12–15.5)
IMM GRANULOCYTES # BLD: 0.01 10*3/MM3 (ref 0–0.02)
IMM GRANULOCYTES NFR BLD: 0.2 % (ref 0–0.5)
LYMPHOCYTES # BLD AUTO: 1.96 10*3/MM3 (ref 0.6–4.2)
LYMPHOCYTES NFR BLD AUTO: 33.2 % (ref 10–50)
MCH RBC QN AUTO: 34.1 PG (ref 26.5–34)
MCHC RBC AUTO-ENTMCNC: 35.5 G/DL (ref 31.4–36)
MCV RBC AUTO: 96 FL (ref 80–98)
MONOCYTES # BLD AUTO: 0.54 10*3/MM3 (ref 0–0.9)
MONOCYTES NFR BLD AUTO: 9.2 % (ref 0–12)
NEUTROPHILS # BLD AUTO: 3.15 10*3/MM3 (ref 2–8.6)
NEUTROPHILS NFR BLD AUTO: 53.3 % (ref 37–80)
PLATELET # BLD AUTO: 194 10*3/MM3 (ref 150–450)
PMV BLD AUTO: 10.6 FL (ref 8–12)
POTASSIUM BLD-SCNC: 3.7 MMOL/L (ref 3.5–5.1)
RBC # BLD AUTO: 3.99 10*6/MM3 (ref 3.77–5.16)
SODIUM BLD-SCNC: 139 MMOL/L (ref 137–145)
WBC NRBC COR # BLD: 5.9 10*3/MM3 (ref 3.2–9.8)

## 2018-02-28 PROCEDURE — G0378 HOSPITAL OBSERVATION PER HR: HCPCS

## 2018-02-28 PROCEDURE — 85025 COMPLETE CBC W/AUTO DIFF WBC: CPT | Performed by: FAMILY MEDICINE

## 2018-02-28 PROCEDURE — 63710000001 INSULIN ASPART PER 5 UNITS: Performed by: PSYCHIATRY & NEUROLOGY

## 2018-02-28 PROCEDURE — 63710000001 INSULIN ASPART PER 5 UNITS: Performed by: FAMILY MEDICINE

## 2018-02-28 PROCEDURE — 63710000001 INSULIN DETEMIR PER 5 UNITS: Performed by: PSYCHIATRY & NEUROLOGY

## 2018-02-28 PROCEDURE — 82962 GLUCOSE BLOOD TEST: CPT

## 2018-02-28 PROCEDURE — 80048 BASIC METABOLIC PNL TOTAL CA: CPT | Performed by: FAMILY MEDICINE

## 2018-02-28 PROCEDURE — 25010000002 MAGNESIUM SULFATE PER 500 MG OF MAGNESIUM: Performed by: FAMILY MEDICINE

## 2018-02-28 PROCEDURE — 25010000002 THIAMINE PER 100 MG: Performed by: FAMILY MEDICINE

## 2018-02-28 RX ORDER — DOCUSATE SODIUM 100 MG/1
100 CAPSULE, LIQUID FILLED ORAL 2 TIMES DAILY PRN
Status: DISCONTINUED | OUTPATIENT
Start: 2018-02-28 | End: 2018-03-02 | Stop reason: HOSPADM

## 2018-02-28 RX ORDER — GLIPIZIDE 10 MG/1
10 TABLET ORAL
Status: ON HOLD
Start: 2018-03-01 | End: 2018-11-27

## 2018-02-28 RX ORDER — LOPERAMIDE HYDROCHLORIDE 2 MG/1
2 CAPSULE ORAL 4 TIMES DAILY PRN
Status: DISCONTINUED | OUTPATIENT
Start: 2018-02-28 | End: 2018-03-02 | Stop reason: HOSPADM

## 2018-02-28 RX ORDER — NICOTINE POLACRILEX 4 MG
15 LOZENGE BUCCAL
Status: DISCONTINUED | OUTPATIENT
Start: 2018-02-28 | End: 2018-03-02 | Stop reason: HOSPADM

## 2018-02-28 RX ORDER — PANTOPRAZOLE SODIUM 40 MG/1
40 TABLET, DELAYED RELEASE ORAL EVERY MORNING
Status: DISCONTINUED | OUTPATIENT
Start: 2018-03-01 | End: 2018-03-02 | Stop reason: HOSPADM

## 2018-02-28 RX ORDER — ALBUTEROL SULFATE 2.5 MG/3ML
2.5 SOLUTION RESPIRATORY (INHALATION) EVERY 6 HOURS PRN
Status: DISCONTINUED | OUTPATIENT
Start: 2018-02-28 | End: 2018-03-02 | Stop reason: HOSPADM

## 2018-02-28 RX ORDER — ONDANSETRON 4 MG/1
4 TABLET, FILM COATED ORAL EVERY 6 HOURS PRN
Status: DISCONTINUED | OUTPATIENT
Start: 2018-02-28 | End: 2018-03-02 | Stop reason: HOSPADM

## 2018-02-28 RX ORDER — ACETAMINOPHEN 325 MG/1
650 TABLET ORAL EVERY 4 HOURS PRN
Status: DISCONTINUED | OUTPATIENT
Start: 2018-02-28 | End: 2018-03-02 | Stop reason: HOSPADM

## 2018-02-28 RX ORDER — DEXTROSE MONOHYDRATE 25 G/50ML
25 INJECTION, SOLUTION INTRAVENOUS
Status: DISCONTINUED | OUTPATIENT
Start: 2018-02-28 | End: 2018-03-02 | Stop reason: HOSPADM

## 2018-02-28 RX ORDER — NICOTINE 21 MG/24HR
1 PATCH, TRANSDERMAL 24 HOURS TRANSDERMAL EVERY 24 HOURS
Status: DISCONTINUED | OUTPATIENT
Start: 2018-02-28 | End: 2018-03-02 | Stop reason: HOSPADM

## 2018-02-28 RX ORDER — HYDROXYZINE PAMOATE 50 MG/1
50 CAPSULE ORAL EVERY 6 HOURS PRN
Status: DISCONTINUED | OUTPATIENT
Start: 2018-02-28 | End: 2018-03-02 | Stop reason: HOSPADM

## 2018-02-28 RX ORDER — CEFDINIR 300 MG/1
300 CAPSULE ORAL 2 TIMES DAILY
Qty: 20 CAPSULE | Refills: 0
Start: 2018-02-28 | End: 2018-03-02 | Stop reason: HOSPADM

## 2018-02-28 RX ORDER — CITALOPRAM 40 MG/1
40 TABLET ORAL DAILY
Status: DISCONTINUED | OUTPATIENT
Start: 2018-02-28 | End: 2018-03-01

## 2018-02-28 RX ORDER — NICOTINE 21 MG/24HR
1 PATCH, TRANSDERMAL 24 HOURS TRANSDERMAL EVERY 24 HOURS
Status: DISCONTINUED | OUTPATIENT
Start: 2018-03-01 | End: 2018-02-28 | Stop reason: SDUPTHER

## 2018-02-28 RX ORDER — CLONIDINE HYDROCHLORIDE 0.1 MG/1
0.1 TABLET ORAL EVERY 4 HOURS PRN
Status: DISCONTINUED | OUTPATIENT
Start: 2018-02-28 | End: 2018-03-02 | Stop reason: HOSPADM

## 2018-02-28 RX ORDER — CEFDINIR 300 MG/1
300 CAPSULE ORAL 2 TIMES DAILY
Status: DISCONTINUED | OUTPATIENT
Start: 2018-02-28 | End: 2018-03-01

## 2018-02-28 RX ORDER — TRAZODONE HYDROCHLORIDE 50 MG/1
50 TABLET ORAL NIGHTLY PRN
Status: DISCONTINUED | OUTPATIENT
Start: 2018-02-28 | End: 2018-03-02 | Stop reason: HOSPADM

## 2018-02-28 RX ORDER — GLIPIZIDE 10 MG/1
10 TABLET ORAL
Status: DISCONTINUED | OUTPATIENT
Start: 2018-03-01 | End: 2018-03-02 | Stop reason: HOSPADM

## 2018-02-28 RX ADMIN — CEFDINIR 300 MG: 300 CAPSULE ORAL at 20:51

## 2018-02-28 RX ADMIN — HYDROXYZINE PAMOATE 50 MG: 50 CAPSULE ORAL at 20:51

## 2018-02-28 RX ADMIN — MAGNESIUM SULFATE HEPTAHYDRATE 100 ML/HR: 500 INJECTION, SOLUTION INTRAMUSCULAR; INTRAVENOUS at 09:49

## 2018-02-28 RX ADMIN — INSULIN ASPART 2 UNITS: 100 INJECTION, SOLUTION INTRAVENOUS; SUBCUTANEOUS at 11:06

## 2018-02-28 RX ADMIN — INSULIN ASPART 4 UNITS: 100 INJECTION, SOLUTION INTRAVENOUS; SUBCUTANEOUS at 20:52

## 2018-02-28 RX ADMIN — TRAZODONE HYDROCHLORIDE 50 MG: 50 TABLET ORAL at 20:51

## 2018-02-28 RX ADMIN — METFORMIN HYDROCHLORIDE 1000 MG: 500 TABLET ORAL at 08:31

## 2018-02-28 RX ADMIN — METFORMIN HYDROCHLORIDE 1000 MG: 500 TABLET ORAL at 20:50

## 2018-02-28 RX ADMIN — GLIPIZIDE 10 MG: 10 TABLET ORAL at 08:32

## 2018-02-28 RX ADMIN — INSULIN ASPART 2 UNITS: 100 INJECTION, SOLUTION INTRAVENOUS; SUBCUTANEOUS at 08:36

## 2018-02-28 RX ADMIN — CITALOPRAM HYDROBROMIDE 40 MG: 40 TABLET ORAL at 08:32

## 2018-02-28 RX ADMIN — NICOTINE 1 PATCH: 21 PATCH, EXTENDED RELEASE TRANSDERMAL at 04:28

## 2018-02-28 RX ADMIN — CLONIDINE HYDROCHLORIDE 0.1 MG: 0.1 TABLET ORAL at 19:49

## 2018-02-28 RX ADMIN — LORAZEPAM 1 MG: 1 TABLET ORAL at 01:07

## 2018-02-28 RX ADMIN — PANTOPRAZOLE SODIUM 40 MG: 40 TABLET, DELAYED RELEASE ORAL at 06:14

## 2018-02-28 RX ADMIN — INSULIN DETEMIR 10 UNITS: 100 INJECTION, SOLUTION SUBCUTANEOUS at 20:52

## 2018-02-28 RX ADMIN — NICOTINE 1 PATCH: 21 PATCH, EXTENDED RELEASE TRANSDERMAL at 16:38

## 2018-03-01 PROBLEM — F10.280: Status: RESOLVED | Noted: 2017-02-13 | Resolved: 2018-03-01

## 2018-03-01 PROBLEM — F10.920 ALCOHOLIC INTOXICATION WITHOUT COMPLICATION (HCC): Status: RESOLVED | Noted: 2018-02-27 | Resolved: 2018-03-01

## 2018-03-01 PROBLEM — F10.930 ALCOHOL WITHDRAWAL SYNDROME WITHOUT COMPLICATION (HCC): Status: RESOLVED | Noted: 2018-01-28 | Resolved: 2018-03-01

## 2018-03-01 PROBLEM — F10.239: Status: RESOLVED | Noted: 2017-02-13 | Resolved: 2018-03-01

## 2018-03-01 LAB
GLUCOSE BLDC GLUCOMTR-MCNC: 102 MG/DL (ref 70–130)
GLUCOSE BLDC GLUCOMTR-MCNC: 175 MG/DL (ref 70–130)
GLUCOSE BLDC GLUCOMTR-MCNC: 217 MG/DL (ref 70–130)
GLUCOSE BLDC GLUCOMTR-MCNC: 233 MG/DL (ref 70–130)

## 2018-03-01 PROCEDURE — 63710000001 INSULIN DETEMIR PER 5 UNITS: Performed by: PSYCHIATRY & NEUROLOGY

## 2018-03-01 PROCEDURE — 63710000001 INSULIN ASPART PER 5 UNITS: Performed by: PSYCHIATRY & NEUROLOGY

## 2018-03-01 PROCEDURE — 90791 PSYCH DIAGNOSTIC EVALUATION: CPT | Performed by: PSYCHIATRY & NEUROLOGY

## 2018-03-01 PROCEDURE — 82962 GLUCOSE BLOOD TEST: CPT

## 2018-03-01 PROCEDURE — 99232 SBSQ HOSP IP/OBS MODERATE 35: CPT | Performed by: FAMILY MEDICINE

## 2018-03-01 RX ORDER — NALTREXONE HYDROCHLORIDE 50 MG/1
25 TABLET, FILM COATED ORAL DAILY
Status: COMPLETED | OUTPATIENT
Start: 2018-03-01 | End: 2018-03-01

## 2018-03-01 RX ORDER — NAPROXEN 500 MG/1
500 TABLET ORAL 2 TIMES DAILY WITH MEALS
Status: DISCONTINUED | OUTPATIENT
Start: 2018-03-01 | End: 2018-03-02 | Stop reason: HOSPADM

## 2018-03-01 RX ORDER — ESCITALOPRAM OXALATE 10 MG/1
20 TABLET ORAL DAILY
Status: DISCONTINUED | OUTPATIENT
Start: 2018-03-02 | End: 2018-03-02 | Stop reason: HOSPADM

## 2018-03-01 RX ORDER — NALTREXONE HYDROCHLORIDE 50 MG/1
50 TABLET, FILM COATED ORAL DAILY
Status: DISCONTINUED | OUTPATIENT
Start: 2018-03-02 | End: 2018-03-02 | Stop reason: HOSPADM

## 2018-03-01 RX ADMIN — CEFDINIR 300 MG: 300 CAPSULE ORAL at 08:27

## 2018-03-01 RX ADMIN — INSULIN DETEMIR 10 UNITS: 100 INJECTION, SOLUTION SUBCUTANEOUS at 21:56

## 2018-03-01 RX ADMIN — PANTOPRAZOLE SODIUM 40 MG: 40 TABLET, DELAYED RELEASE ORAL at 06:26

## 2018-03-01 RX ADMIN — NALTREXONE HYDROCHLORIDE 25 MG: 50 TABLET, FILM COATED ORAL at 15:28

## 2018-03-01 RX ADMIN — INSULIN ASPART 2 UNITS: 100 INJECTION, SOLUTION INTRAVENOUS; SUBCUTANEOUS at 21:58

## 2018-03-01 RX ADMIN — TRAZODONE HYDROCHLORIDE 50 MG: 50 TABLET ORAL at 20:16

## 2018-03-01 RX ADMIN — NICOTINE 1 PATCH: 21 PATCH, EXTENDED RELEASE TRANSDERMAL at 15:00

## 2018-03-01 RX ADMIN — HYDROXYZINE PAMOATE 50 MG: 50 CAPSULE ORAL at 20:16

## 2018-03-01 RX ADMIN — CLONIDINE HYDROCHLORIDE 0.1 MG: 0.1 TABLET ORAL at 20:16

## 2018-03-01 RX ADMIN — INSULIN ASPART 4 UNITS: 100 INJECTION, SOLUTION INTRAVENOUS; SUBCUTANEOUS at 08:29

## 2018-03-01 RX ADMIN — INSULIN ASPART 4 UNITS: 100 INJECTION, SOLUTION INTRAVENOUS; SUBCUTANEOUS at 17:03

## 2018-03-01 RX ADMIN — CITALOPRAM HYDROBROMIDE 40 MG: 40 TABLET ORAL at 08:27

## 2018-03-01 RX ADMIN — METFORMIN HYDROCHLORIDE 1000 MG: 500 TABLET ORAL at 20:16

## 2018-03-01 RX ADMIN — GLIPIZIDE 10 MG: 10 TABLET ORAL at 08:27

## 2018-03-01 RX ADMIN — METFORMIN HYDROCHLORIDE 1000 MG: 500 TABLET ORAL at 08:26

## 2018-03-01 RX ADMIN — NAPROXEN 500 MG: 500 TABLET ORAL at 17:03
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
## 2018-03-01 NOTE — PLAN OF CARE
Problem: [facility] Patient Care Overview (Adult)  Goal: Individualization and Mutuality  Outcome: Ongoing (interventions implemented as appropriate)    Goal: Discharge Needs Assessment  Outcome: Ongoing (interventions implemented as appropriate)      Problem:  Overarching Goals  Goal: Adheres to Safety Considerations for Self and Others  Outcome: Ongoing (interventions implemented as appropriate)    Goal: Optimized Coping Skills in Response to Life Stressors  Outcome: Ongoing (interventions implemented as appropriate)    Goal: Develops/Participates in Therapeutic [place] to Support Successful Transition  Outcome: Ongoing (interventions implemented as appropriate)

## 2018-03-01 NOTE — NURSING NOTE
Behavior   Anxiety: No s/s of anxiety  Depression: No s/s of depression  Pain   0  AVH   no  S/I   no  H/I   no  Affect   calm and pleasant    Patient speech is clear, makes appropriate eye contact, dressed appropriately. Patient is watching tv and seems to be in a good mood. Patient stated that she feels a lot better than when first admitted and is anxious to get the proper help needed for alcohol cessation. Left patient to finish watching her tv show.           Intervention  Medications reviewed and administered  Assessment complete    Response  Verbalized understanding   Took medications  Interacted with assessment    Plan  Will promote and reinforce current treatment plan and encourage involvement in care plan goals.   Will provide for safe, calm, quiet environment.  Will promote open communication with staff and foster a trusting/working relationship with patient.   Will promote participation in groups and therapies and independent reflection.

## 2018-03-01 NOTE — PLAN OF CARE
Problem: Depression  Goal: Verbalize thoughts and feelings associated with:  Outcome: Ongoing (interventions implemented as appropriate)
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
## 2018-03-01 NOTE — PLAN OF CARE
Problem: Coping, Compromised Individual (Adult,Obstetrics,Pediatric)  Goal: Identify Related Risk Factors and Signs and Symptoms  Outcome: Ongoing (interventions implemented as appropriate)
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
## 2018-03-02 VITALS
WEIGHT: 207.38 LBS | RESPIRATION RATE: 20 BRPM | TEMPERATURE: 97.9 F | DIASTOLIC BLOOD PRESSURE: 88 MMHG | HEART RATE: 86 BPM | SYSTOLIC BLOOD PRESSURE: 141 MMHG | HEIGHT: 66 IN | OXYGEN SATURATION: 96 % | BODY MASS INDEX: 33.33 KG/M2

## 2018-03-02 PROBLEM — R45.851 SUICIDAL IDEATION: Status: RESOLVED | Noted: 2017-02-15 | Resolved: 2018-03-02

## 2018-03-02 LAB
GLUCOSE BLDC GLUCOMTR-MCNC: 139 MG/DL (ref 70–130)
GLUCOSE BLDC GLUCOMTR-MCNC: 153 MG/DL (ref 70–130)

## 2018-03-02 PROCEDURE — 63710000001 INSULIN ASPART PER 5 UNITS: Performed by: PSYCHIATRY & NEUROLOGY

## 2018-03-02 PROCEDURE — 82962 GLUCOSE BLOOD TEST: CPT

## 2018-03-02 PROCEDURE — 99238 HOSP IP/OBS DSCHRG MGMT 30/<: CPT | Performed by: PSYCHIATRY & NEUROLOGY

## 2018-03-02 RX ORDER — NALTREXONE HYDROCHLORIDE 50 MG/1
50 TABLET, FILM COATED ORAL DAILY
Qty: 30 TABLET | Refills: 0 | Status: ON HOLD | OUTPATIENT
Start: 2018-03-03 | End: 2018-11-27

## 2018-03-02 RX ORDER — ESCITALOPRAM OXALATE 20 MG/1
20 TABLET ORAL DAILY
Qty: 30 TABLET | Refills: 0 | Status: ON HOLD | OUTPATIENT
Start: 2018-03-03 | End: 2018-11-30 | Stop reason: SDUPTHER

## 2018-03-02 RX ADMIN — NALTREXONE HYDROCHLORIDE 50 MG: 50 TABLET, FILM COATED ORAL at 08:01

## 2018-03-02 RX ADMIN — GLIPIZIDE 10 MG: 10 TABLET ORAL at 08:01

## 2018-03-02 RX ADMIN — INSULIN ASPART 2 UNITS: 100 INJECTION, SOLUTION INTRAVENOUS; SUBCUTANEOUS at 12:09

## 2018-03-02 RX ADMIN — NAPROXEN 500 MG: 500 TABLET ORAL at 08:01

## 2018-03-02 RX ADMIN — ESCITALOPRAM OXALATE 20 MG: 10 TABLET ORAL at 08:01

## 2018-03-02 RX ADMIN — PANTOPRAZOLE SODIUM 40 MG: 40 TABLET, DELAYED RELEASE ORAL at 06:21

## 2018-03-02 RX ADMIN — METFORMIN HYDROCHLORIDE 1000 MG: 500 TABLET ORAL at 08:01

## 2018-03-02 NOTE — PLAN OF CARE
Problem: BH Patient Care Overview (Adult)  Goal: Individualization and Mutuality  Outcome: Ongoing (interventions implemented as appropriate)      Problem:  Overarching Goals  Goal: Optimized Coping Skills in Response to Life Stressors  Outcome: Ongoing (interventions implemented as appropriate)    Goal: Develops/Participates in Therapeutic [place] to Support Successful Transition  Outcome: Ongoing (interventions implemented as appropriate)

## 2018-03-02 NOTE — PLAN OF CARE
Problem: BH Patient Care Overview (Adult)  Goal: Plan of Care Review  Outcome: Ongoing (interventions implemented as appropriate)    Goal: Interdisciplinary Rounds/Family Conference  Outcome: Ongoing (interventions implemented as appropriate)

## 2018-03-02 NOTE — NURSING NOTE
"Behavior   Anxiety: Feeling worried  Depression: depressed mood and anxiety  Pain  0  AVH   no  S/I   no  H/I   no    Affect   calm and pleasant    Note:  Pt is sitting in common area interacting with other patients. Patients blood pressure was elevated and she was medicated per orders. Pt did not appear concerned and has had a very under whelmed general affect. When asked about her day pt states that she had a good day and that she feels like \"everyone, even the staff, seems like we're all here for a reason. Some people even for the same reasons.\" When pt was asked about her groups for the day she answered as she drank her water and speech was mumbled. When asked if she said \"awful\" or \"awesome\" pt responded \"either or. They're both the same to me\".   Pt stated that she doesn't feel like the Clonidine for her BP is helping.   Pt appeared focused on discharged and asked \"why do some people go home after just 2 days?\" It was explained to pt that the Dr. Decides the length of a pt's care based on individual needs. Pt appeared understanding but asked the same question about an hour later. Pt is resting in her room at this time.     Intervention  Instructed in medication usage and effects  Medications administered as ordered  Encouraged to verbalize needs      Response  Verbalized understanding   Did patient take medications as ordered yes   Did patient interact with assessment?  yes     Plan  Will monitor for safety  Will monitor every 15 minutes as ordered  Will evaluate and promote the plan of care    "

## 2018-03-02 NOTE — PLAN OF CARE
Problem: Coping, Compromised Individual (Adult,Obstetrics,Pediatric)  Goal: Identify Related Risk Factors and Signs and Symptoms  Outcome: Ongoing (interventions implemented as appropriate)    Goal: Effective Coping  Outcome: Ongoing (interventions implemented as appropriate)

## 2018-03-02 NOTE — NURSING NOTE
Behavior   Anxiety: Feeling anxious  Depression: none  Pain  0  AVH   no  S/I   no  H/I   no    Affect   calm and pleasant    Note: patient is sitting in the day area, she is calm and oriented. She states that she is feeling better and that she slept better last night than she has in a long time. She is not presenting with SI/HI/AVH She states she is ready to go home whenever the doctor states she can      Intervention  Instructed in medication usage and effects  Medications administered as ordered  Encouraged to verbalize needs      Response  Verbalized understanding   Did patient take medications as ordered yes   Did patient interact with assessment?  yes     Plan  Will monitor for safety  Will monitor every 15 minutes as ordered  Will evaluate and promote the plan of care
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
## 2018-03-02 NOTE — PLAN OF CARE
Problem: [facility] Patient Care Overview (Adult)  Goal: Individualization and Mutuality  Outcome: Ongoing (interventions implemented as appropriate)    Goal: Discharge Needs Assessment  Outcome: Ongoing (interventions implemented as appropriate)      Problem:  Overarching Goals  Goal: Adheres to Safety Considerations for Self and Others  Outcome: Ongoing (interventions implemented as appropriate)    Goal: Optimized Coping Skills in Response to Life Stressors  Outcome: Ongoing (interventions implemented as appropriate)    Goal: Develops/Participates in Therapeutic [place] to Support Successful Transition  Outcome: Ongoing (interventions implemented as appropriate)
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
## 2018-03-02 NOTE — NURSING NOTE
Patient is discharged home with family. She is alert/oriented. Her belongings are sent with her. She has her AVS with instructions and appointments with her. She has been made aware of crisis lines and to call if there are any questions.

## 2018-11-26 ENCOUNTER — HOSPITAL ENCOUNTER (OUTPATIENT)
Facility: HOSPITAL | Age: 38
Setting detail: OBSERVATION
End: 2018-11-28
Attending: EMERGENCY MEDICINE | Admitting: INTERNAL MEDICINE

## 2018-11-26 DIAGNOSIS — T38.3X4A: Primary | ICD-10-CM

## 2018-11-26 LAB
ALBUMIN SERPL-MCNC: 5.1 G/DL (ref 3.4–4.8)
ALBUMIN/GLOB SERPL: 1.3 G/DL (ref 1.1–1.8)
ALP SERPL-CCNC: 82 U/L (ref 38–126)
ALT SERPL W P-5'-P-CCNC: 68 U/L (ref 9–52)
AMPHET+METHAMPHET UR QL: NEGATIVE
ANION GAP SERPL CALCULATED.3IONS-SCNC: 18 MMOL/L (ref 5–15)
APAP SERPL-MCNC: <10 MCG/ML (ref 10–30)
AST SERPL-CCNC: 55 U/L (ref 14–36)
BARBITURATES UR QL SCN: NEGATIVE
BASOPHILS # BLD AUTO: 0.01 10*3/MM3 (ref 0–0.2)
BASOPHILS NFR BLD AUTO: 0.2 % (ref 0–2)
BENZODIAZ UR QL SCN: NEGATIVE
BILIRUB SERPL-MCNC: 0.3 MG/DL (ref 0.2–1.3)
BILIRUB UR QL STRIP: NEGATIVE
BUN BLD-MCNC: 9 MG/DL (ref 7–21)
BUN/CREAT SERPL: 14.5 (ref 7–25)
CALCIUM SPEC-SCNC: 9.4 MG/DL (ref 8.4–10.2)
CANNABINOIDS SERPL QL: NEGATIVE
CHLORIDE SERPL-SCNC: 94 MMOL/L (ref 95–110)
CLARITY UR: CLEAR
CO2 SERPL-SCNC: 23 MMOL/L (ref 22–31)
COCAINE UR QL: NEGATIVE
COLOR UR: YELLOW
CREAT BLD-MCNC: 0.62 MG/DL (ref 0.5–1)
D-LACTATE SERPL-SCNC: 6 MMOL/L (ref 0.5–2)
DEPRECATED RDW RBC AUTO: 43.4 FL (ref 36.4–46.3)
EOSINOPHIL # BLD AUTO: 0.14 10*3/MM3 (ref 0–0.7)
EOSINOPHIL NFR BLD AUTO: 2.2 % (ref 0–7)
ERYTHROCYTE [DISTWIDTH] IN BLOOD BY AUTOMATED COUNT: 12 % (ref 11.5–14.5)
ETHANOL BLD-MCNC: 295 MG/DL (ref 0–10)
ETHANOL UR QL: 0.29 %
GFR SERPL CREATININE-BSD FRML MDRD: 107 ML/MIN/1.73 (ref 58–135)
GLOBULIN UR ELPH-MCNC: 4 GM/DL (ref 2.3–3.5)
GLUCOSE BLD-MCNC: 215 MG/DL (ref 60–100)
GLUCOSE BLDC GLUCOMTR-MCNC: 104 MG/DL (ref 70–130)
GLUCOSE UR STRIP-MCNC: ABNORMAL MG/DL
HCT VFR BLD AUTO: 44.2 % (ref 35–45)
HGB BLD-MCNC: 15.9 G/DL (ref 12–15.5)
HGB UR QL STRIP.AUTO: NEGATIVE
HOLD SPECIMEN: NORMAL
IMM GRANULOCYTES # BLD: 0.01 10*3/MM3 (ref 0–0.02)
IMM GRANULOCYTES NFR BLD: 0.2 % (ref 0–0.5)
KETONES UR QL STRIP: NEGATIVE
LEUKOCYTE ESTERASE UR QL STRIP.AUTO: NEGATIVE
LYMPHOCYTES # BLD AUTO: 2.71 10*3/MM3 (ref 0.6–4.2)
LYMPHOCYTES NFR BLD AUTO: 42.7 % (ref 10–50)
MCH RBC QN AUTO: 35.2 PG (ref 26.5–34)
MCHC RBC AUTO-ENTMCNC: 36 G/DL (ref 31.4–36)
MCV RBC AUTO: 97.8 FL (ref 80–98)
METHADONE UR QL SCN: NEGATIVE
MONOCYTES # BLD AUTO: 0.54 10*3/MM3 (ref 0–0.9)
MONOCYTES NFR BLD AUTO: 8.5 % (ref 0–12)
NEUTROPHILS # BLD AUTO: 2.94 10*3/MM3 (ref 2–8.6)
NEUTROPHILS NFR BLD AUTO: 46.2 % (ref 37–80)
NITRITE UR QL STRIP: NEGATIVE
OPIATES UR QL: NEGATIVE
OXYCODONE UR QL SCN: NEGATIVE
PH UR STRIP.AUTO: <=5 [PH] (ref 5–9)
PLATELET # BLD AUTO: 257 10*3/MM3 (ref 150–450)
PMV BLD AUTO: 9.6 FL (ref 8–12)
POTASSIUM BLD-SCNC: 4.2 MMOL/L (ref 3.5–5.1)
PROT SERPL-MCNC: 9.1 G/DL (ref 6.3–8.6)
PROT UR QL STRIP: NEGATIVE
RBC # BLD AUTO: 4.52 10*6/MM3 (ref 3.77–5.16)
SALICYLATES SERPL-MCNC: <1 MG/DL (ref 10–20)
SODIUM BLD-SCNC: 135 MMOL/L (ref 137–145)
SP GR UR STRIP: 1.01 (ref 1–1.03)
UROBILINOGEN UR QL STRIP: ABNORMAL
WBC NRBC COR # BLD: 6.35 10*3/MM3 (ref 3.2–9.8)
WHOLE BLOOD HOLD SPECIMEN: NORMAL
WHOLE BLOOD HOLD SPECIMEN: NORMAL

## 2018-11-26 PROCEDURE — G0378 HOSPITAL OBSERVATION PER HR: HCPCS

## 2018-11-26 PROCEDURE — 80307 DRUG TEST PRSMV CHEM ANLYZR: CPT | Performed by: EMERGENCY MEDICINE

## 2018-11-26 PROCEDURE — 82306 VITAMIN D 25 HYDROXY: CPT | Performed by: PSYCHIATRY & NEUROLOGY

## 2018-11-26 PROCEDURE — 80053 COMPREHEN METABOLIC PANEL: CPT | Performed by: EMERGENCY MEDICINE

## 2018-11-26 PROCEDURE — 94640 AIRWAY INHALATION TREATMENT: CPT

## 2018-11-26 PROCEDURE — 82962 GLUCOSE BLOOD TEST: CPT

## 2018-11-26 PROCEDURE — 83605 ASSAY OF LACTIC ACID: CPT | Performed by: EMERGENCY MEDICINE

## 2018-11-26 PROCEDURE — 36415 COLL VENOUS BLD VENIPUNCTURE: CPT | Performed by: EMERGENCY MEDICINE

## 2018-11-26 PROCEDURE — 81003 URINALYSIS AUTO W/O SCOPE: CPT | Performed by: EMERGENCY MEDICINE

## 2018-11-26 PROCEDURE — 85025 COMPLETE CBC W/AUTO DIFF WBC: CPT | Performed by: EMERGENCY MEDICINE

## 2018-11-26 PROCEDURE — 94760 N-INVAS EAR/PLS OXIMETRY 1: CPT

## 2018-11-26 PROCEDURE — 94799 UNLISTED PULMONARY SVC/PX: CPT

## 2018-11-26 RX ORDER — THIAMINE MONONITRATE (VIT B1) 100 MG
100 TABLET ORAL DAILY
Status: DISCONTINUED | OUTPATIENT
Start: 2018-11-27 | End: 2018-11-28 | Stop reason: HOSPADM

## 2018-11-26 RX ORDER — NICOTINE 21 MG/24HR
1 PATCH, TRANSDERMAL 24 HOURS TRANSDERMAL EVERY 24 HOURS
Status: DISCONTINUED | OUTPATIENT
Start: 2018-11-27 | End: 2018-11-28 | Stop reason: HOSPADM

## 2018-11-26 RX ORDER — NICOTINE POLACRILEX 4 MG
15 LOZENGE BUCCAL
Status: DISCONTINUED | OUTPATIENT
Start: 2018-11-26 | End: 2018-11-28 | Stop reason: HOSPADM

## 2018-11-26 RX ORDER — LORAZEPAM 1 MG/1
1 TABLET ORAL EVERY 6 HOURS PRN
Status: DISCONTINUED | OUTPATIENT
Start: 2018-11-26 | End: 2018-11-28 | Stop reason: HOSPADM

## 2018-11-26 RX ORDER — PANTOPRAZOLE SODIUM 40 MG/1
40 TABLET, DELAYED RELEASE ORAL EVERY MORNING
Status: DISCONTINUED | OUTPATIENT
Start: 2018-11-27 | End: 2018-11-28 | Stop reason: HOSPADM

## 2018-11-26 RX ORDER — ESCITALOPRAM OXALATE 10 MG/1
20 TABLET ORAL DAILY
Status: DISCONTINUED | OUTPATIENT
Start: 2018-11-27 | End: 2018-11-28 | Stop reason: HOSPADM

## 2018-11-26 RX ORDER — ALBUTEROL SULFATE 2.5 MG/3ML
2.5 SOLUTION RESPIRATORY (INHALATION)
Status: DISCONTINUED | OUTPATIENT
Start: 2018-11-27 | End: 2018-11-28 | Stop reason: HOSPADM

## 2018-11-26 RX ORDER — SODIUM CHLORIDE 0.9 % (FLUSH) 0.9 %
10 SYRINGE (ML) INJECTION AS NEEDED
Status: DISCONTINUED | OUTPATIENT
Start: 2018-11-26 | End: 2018-11-28 | Stop reason: HOSPADM

## 2018-11-26 RX ORDER — LORAZEPAM 2 MG/ML
2 INJECTION INTRAMUSCULAR EVERY 4 HOURS PRN
Status: DISCONTINUED | OUTPATIENT
Start: 2018-11-26 | End: 2018-11-28 | Stop reason: HOSPADM

## 2018-11-26 RX ORDER — SODIUM CHLORIDE 9 MG/ML
INJECTION, SOLUTION INTRAVENOUS
Status: COMPLETED
Start: 2018-11-26 | End: 2018-11-26

## 2018-11-26 RX ORDER — ACETAMINOPHEN 325 MG/1
650 TABLET ORAL EVERY 4 HOURS PRN
Status: DISCONTINUED | OUTPATIENT
Start: 2018-11-26 | End: 2018-11-28 | Stop reason: HOSPADM

## 2018-11-26 RX ORDER — SODIUM CHLORIDE, SODIUM LACTATE, POTASSIUM CHLORIDE, CALCIUM CHLORIDE 600; 310; 30; 20 MG/100ML; MG/100ML; MG/100ML; MG/100ML
150 INJECTION, SOLUTION INTRAVENOUS CONTINUOUS
Status: DISPENSED | OUTPATIENT
Start: 2018-11-27 | End: 2018-11-28

## 2018-11-26 RX ORDER — NALTREXONE HYDROCHLORIDE 50 MG/1
50 TABLET, FILM COATED ORAL DAILY
Status: DISCONTINUED | OUTPATIENT
Start: 2018-11-27 | End: 2018-11-28 | Stop reason: HOSPADM

## 2018-11-26 RX ORDER — BISACODYL 5 MG/1
5 TABLET, DELAYED RELEASE ORAL DAILY PRN
Status: DISCONTINUED | OUTPATIENT
Start: 2018-11-26 | End: 2018-11-28 | Stop reason: HOSPADM

## 2018-11-26 RX ORDER — ONDANSETRON 2 MG/ML
4 INJECTION INTRAMUSCULAR; INTRAVENOUS EVERY 6 HOURS PRN
Status: DISCONTINUED | OUTPATIENT
Start: 2018-11-26 | End: 2018-11-28 | Stop reason: HOSPADM

## 2018-11-26 RX ORDER — DEXTROSE MONOHYDRATE 25 G/50ML
25 INJECTION, SOLUTION INTRAVENOUS
Status: DISCONTINUED | OUTPATIENT
Start: 2018-11-26 | End: 2018-11-28 | Stop reason: HOSPADM

## 2018-11-26 RX ADMIN — SODIUM CHLORIDE 1000 ML: 900 INJECTION, SOLUTION INTRAVENOUS at 21:06

## 2018-11-26 RX ADMIN — SODIUM CHLORIDE 1000 ML: 900 INJECTION, SOLUTION INTRAVENOUS at 20:34

## 2018-11-26 RX ADMIN — ALBUTEROL SULFATE 2.5 MG: 2.5 SOLUTION RESPIRATORY (INHALATION) at 23:34

## 2018-11-27 PROBLEM — E87.20 LACTIC ACIDOSIS: Status: ACTIVE | Noted: 2018-11-27

## 2018-11-27 LAB
ANION GAP SERPL CALCULATED.3IONS-SCNC: 13 MMOL/L (ref 5–15)
BUN BLD-MCNC: 10 MG/DL (ref 7–21)
BUN/CREAT SERPL: 18.5 (ref 7–25)
CALCIUM SPEC-SCNC: 8.7 MG/DL (ref 8.4–10.2)
CHLORIDE SERPL-SCNC: 99 MMOL/L (ref 95–110)
CO2 SERPL-SCNC: 20 MMOL/L (ref 22–31)
CREAT BLD-MCNC: 0.54 MG/DL (ref 0.5–1)
D-LACTATE SERPL-SCNC: 2.3 MMOL/L (ref 0.5–2)
D-LACTATE SERPL-SCNC: 5.8 MMOL/L (ref 0.5–2)
GFR SERPL CREATININE-BSD FRML MDRD: 125 ML/MIN/1.73 (ref 58–135)
GLUCOSE BLD-MCNC: 146 MG/DL (ref 60–100)
GLUCOSE BLDC GLUCOMTR-MCNC: 109 MG/DL (ref 70–130)
GLUCOSE BLDC GLUCOMTR-MCNC: 146 MG/DL (ref 70–130)
GLUCOSE BLDC GLUCOMTR-MCNC: 153 MG/DL (ref 70–130)
GLUCOSE BLDC GLUCOMTR-MCNC: 196 MG/DL (ref 70–130)
GLUCOSE BLDC GLUCOMTR-MCNC: 254 MG/DL (ref 70–130)
GLUCOSE BLDC GLUCOMTR-MCNC: 264 MG/DL (ref 70–130)
HOLD SPECIMEN: NORMAL
POTASSIUM BLD-SCNC: 4.2 MMOL/L (ref 3.5–5.1)
SODIUM BLD-SCNC: 132 MMOL/L (ref 137–145)

## 2018-11-27 PROCEDURE — 94799 UNLISTED PULMONARY SVC/PX: CPT

## 2018-11-27 PROCEDURE — 94760 N-INVAS EAR/PLS OXIMETRY 1: CPT

## 2018-11-27 PROCEDURE — 25010000002 HYDRALAZINE PER 20 MG: Performed by: INTERNAL MEDICINE

## 2018-11-27 PROCEDURE — 63710000001 INSULIN DETEMIR PER 5 UNITS: Performed by: INTERNAL MEDICINE

## 2018-11-27 PROCEDURE — 83605 ASSAY OF LACTIC ACID: CPT | Performed by: EMERGENCY MEDICINE

## 2018-11-27 PROCEDURE — G0378 HOSPITAL OBSERVATION PER HR: HCPCS

## 2018-11-27 PROCEDURE — 83605 ASSAY OF LACTIC ACID: CPT | Performed by: NURSE PRACTITIONER

## 2018-11-27 PROCEDURE — 63710000001 INSULIN ASPART PER 5 UNITS: Performed by: INTERNAL MEDICINE

## 2018-11-27 PROCEDURE — 82962 GLUCOSE BLOOD TEST: CPT

## 2018-11-27 PROCEDURE — 25010000002 ONDANSETRON PER 1 MG: Performed by: INTERNAL MEDICINE

## 2018-11-27 PROCEDURE — 80048 BASIC METABOLIC PNL TOTAL CA: CPT | Performed by: INTERNAL MEDICINE

## 2018-11-27 PROCEDURE — 99215 OFFICE O/P EST HI 40 MIN: CPT | Performed by: PSYCHIATRY & NEUROLOGY

## 2018-11-27 RX ORDER — LORAZEPAM 1 MG/1
1 TABLET ORAL EVERY 8 HOURS
Status: DISCONTINUED | OUTPATIENT
Start: 2018-11-28 | End: 2018-11-28 | Stop reason: HOSPADM

## 2018-11-27 RX ORDER — LORAZEPAM 2 MG/ML
2 INJECTION INTRAMUSCULAR
Status: DISCONTINUED | OUTPATIENT
Start: 2018-11-27 | End: 2018-11-28 | Stop reason: HOSPADM

## 2018-11-27 RX ORDER — LORAZEPAM 2 MG/ML
1 INJECTION INTRAMUSCULAR
Status: DISCONTINUED | OUTPATIENT
Start: 2018-11-27 | End: 2018-11-28 | Stop reason: HOSPADM

## 2018-11-27 RX ORDER — HYDRALAZINE HYDROCHLORIDE 20 MG/ML
10 INJECTION INTRAMUSCULAR; INTRAVENOUS EVERY 4 HOURS PRN
Status: DISCONTINUED | OUTPATIENT
Start: 2018-11-27 | End: 2018-11-28 | Stop reason: HOSPADM

## 2018-11-27 RX ORDER — AMLODIPINE BESYLATE 10 MG/1
10 TABLET ORAL ONCE
Status: COMPLETED | OUTPATIENT
Start: 2018-11-27 | End: 2018-11-27

## 2018-11-27 RX ORDER — LORAZEPAM 1 MG/1
1 TABLET ORAL
Status: DISCONTINUED | OUTPATIENT
Start: 2018-11-27 | End: 2018-11-28 | Stop reason: HOSPADM

## 2018-11-27 RX ORDER — LORAZEPAM 2 MG/1
2 TABLET ORAL
Status: DISCONTINUED | OUTPATIENT
Start: 2018-11-27 | End: 2018-11-28 | Stop reason: HOSPADM

## 2018-11-27 RX ORDER — LORAZEPAM 1 MG/1
1 TABLET ORAL EVERY 6 HOURS
Status: DISPENSED | OUTPATIENT
Start: 2018-11-27 | End: 2018-11-28

## 2018-11-27 RX ADMIN — SODIUM CHLORIDE, POTASSIUM CHLORIDE, SODIUM LACTATE AND CALCIUM CHLORIDE 150 ML/HR: 600; 310; 30; 20 INJECTION, SOLUTION INTRAVENOUS at 19:00

## 2018-11-27 RX ADMIN — ESCITALOPRAM OXALATE 20 MG: 10 TABLET ORAL at 08:09

## 2018-11-27 RX ADMIN — INSULIN ASPART 3 UNITS: 100 INJECTION, SOLUTION INTRAVENOUS; SUBCUTANEOUS at 21:02

## 2018-11-27 RX ADMIN — LORAZEPAM 1 MG: 1 TABLET ORAL at 17:45

## 2018-11-27 RX ADMIN — LORAZEPAM 2 MG: 2 TABLET ORAL at 09:03

## 2018-11-27 RX ADMIN — SODIUM CHLORIDE, POTASSIUM CHLORIDE, SODIUM LACTATE AND CALCIUM CHLORIDE 150 ML/HR: 600; 310; 30; 20 INJECTION, SOLUTION INTRAVENOUS at 00:19

## 2018-11-27 RX ADMIN — INSULIN ASPART 3 UNITS: 100 INJECTION, SOLUTION INTRAVENOUS; SUBCUTANEOUS at 08:10

## 2018-11-27 RX ADMIN — SODIUM CHLORIDE, POTASSIUM CHLORIDE, SODIUM LACTATE AND CALCIUM CHLORIDE 150 ML/HR: 600; 310; 30; 20 INJECTION, SOLUTION INTRAVENOUS at 23:27

## 2018-11-27 RX ADMIN — NICOTINE 1 PATCH: 21 PATCH TRANSDERMAL at 23:26

## 2018-11-27 RX ADMIN — ALBUTEROL SULFATE 2.5 MG: 2.5 SOLUTION RESPIRATORY (INHALATION) at 18:41

## 2018-11-27 RX ADMIN — ALBUTEROL SULFATE 2.5 MG: 2.5 SOLUTION RESPIRATORY (INHALATION) at 07:34

## 2018-11-27 RX ADMIN — INSULIN ASPART 8 UNITS: 100 INJECTION, SOLUTION INTRAVENOUS; SUBCUTANEOUS at 12:34

## 2018-11-27 RX ADMIN — PANTOPRAZOLE SODIUM 40 MG: 40 TABLET, DELAYED RELEASE ORAL at 06:09

## 2018-11-27 RX ADMIN — LORAZEPAM 1 MG: 1 TABLET ORAL at 12:34

## 2018-11-27 RX ADMIN — INSULIN DETEMIR 10 UNITS: 100 INJECTION, SOLUTION SUBCUTANEOUS at 21:02

## 2018-11-27 RX ADMIN — AMLODIPINE BESYLATE 10 MG: 10 TABLET ORAL at 06:38

## 2018-11-27 RX ADMIN — LORAZEPAM 1 MG: 1 TABLET ORAL at 00:19

## 2018-11-27 RX ADMIN — NALTREXONE HYDROCHLORIDE 50 MG: 50 TABLET, FILM COATED ORAL at 08:10

## 2018-11-27 RX ADMIN — ONDANSETRON HYDROCHLORIDE 4 MG: 2 INJECTION, SOLUTION INTRAMUSCULAR; INTRAVENOUS at 09:03

## 2018-11-27 RX ADMIN — ALBUTEROL SULFATE 2.5 MG: 2.5 SOLUTION RESPIRATORY (INHALATION) at 10:58

## 2018-11-27 RX ADMIN — Medication 100 MG: at 00:19

## 2018-11-27 RX ADMIN — INSULIN ASPART 8 UNITS: 100 INJECTION, SOLUTION INTRAVENOUS; SUBCUTANEOUS at 17:44

## 2018-11-27 RX ADMIN — HYDRALAZINE HYDROCHLORIDE 10 MG: 20 INJECTION INTRAMUSCULAR; INTRAVENOUS at 05:04

## 2018-11-27 RX ADMIN — NICOTINE 1 PATCH: 21 PATCH TRANSDERMAL at 00:19

## 2018-11-27 RX ADMIN — SODIUM CHLORIDE, POTASSIUM CHLORIDE, SODIUM LACTATE AND CALCIUM CHLORIDE 150 ML/HR: 600; 310; 30; 20 INJECTION, SOLUTION INTRAVENOUS at 12:35

## 2018-11-27 RX ADMIN — LORAZEPAM 1 MG: 1 TABLET ORAL at 23:39

## 2018-11-27 RX ADMIN — SODIUM CHLORIDE, POTASSIUM CHLORIDE, SODIUM LACTATE AND CALCIUM CHLORIDE 150 ML/HR: 600; 310; 30; 20 INJECTION, SOLUTION INTRAVENOUS at 06:09

## 2018-11-28 ENCOUNTER — HOSPITAL ENCOUNTER (INPATIENT)
Facility: HOSPITAL | Age: 38
LOS: 2 days | Discharge: HOME | End: 2018-11-30
Attending: PSYCHIATRY & NEUROLOGY | Admitting: PSYCHIATRY & NEUROLOGY

## 2018-11-28 VITALS
BODY MASS INDEX: 30.53 KG/M2 | HEART RATE: 88 BPM | TEMPERATURE: 96.8 F | SYSTOLIC BLOOD PRESSURE: 155 MMHG | HEIGHT: 66 IN | WEIGHT: 190 LBS | OXYGEN SATURATION: 99 % | RESPIRATION RATE: 16 BRPM | DIASTOLIC BLOOD PRESSURE: 92 MMHG

## 2018-11-28 PROBLEM — R45.851 SUICIDAL IDEATION: Status: ACTIVE | Noted: 2018-11-28

## 2018-11-28 LAB
25(OH)D3 SERPL-MCNC: 30.7 NG/ML (ref 30–100)
ALBUMIN SERPL-MCNC: 3.7 G/DL (ref 3.4–4.8)
ALBUMIN/GLOB SERPL: 1.3 G/DL (ref 1.1–1.8)
ALP SERPL-CCNC: 62 U/L (ref 38–126)
ALT SERPL W P-5'-P-CCNC: 42 U/L (ref 9–52)
ANION GAP SERPL CALCULATED.3IONS-SCNC: 8 MMOL/L (ref 5–15)
AST SERPL-CCNC: 38 U/L (ref 14–36)
BASOPHILS # BLD AUTO: 0.01 10*3/MM3 (ref 0–0.2)
BASOPHILS NFR BLD AUTO: 0.2 % (ref 0–2)
BILIRUB SERPL-MCNC: 0.5 MG/DL (ref 0.2–1.3)
BUN BLD-MCNC: 9 MG/DL (ref 7–21)
BUN/CREAT SERPL: 16.7 (ref 7–25)
CALCIUM SPEC-SCNC: 9 MG/DL (ref 8.4–10.2)
CHLORIDE SERPL-SCNC: 99 MMOL/L (ref 95–110)
CO2 SERPL-SCNC: 26 MMOL/L (ref 22–31)
CREAT BLD-MCNC: 0.54 MG/DL (ref 0.5–1)
D-LACTATE SERPL-SCNC: 1.1 MMOL/L (ref 0.5–2)
DEPRECATED RDW RBC AUTO: 40.5 FL (ref 36.4–46.3)
EOSINOPHIL # BLD AUTO: 0.1 10*3/MM3 (ref 0–0.7)
EOSINOPHIL NFR BLD AUTO: 1.9 % (ref 0–7)
ERYTHROCYTE [DISTWIDTH] IN BLOOD BY AUTOMATED COUNT: 11.7 % (ref 11.5–14.5)
GFR SERPL CREATININE-BSD FRML MDRD: 125 ML/MIN/1.73 (ref 58–135)
GLOBULIN UR ELPH-MCNC: 2.9 GM/DL (ref 2.3–3.5)
GLUCOSE BLD-MCNC: 198 MG/DL (ref 60–100)
GLUCOSE BLDC GLUCOMTR-MCNC: 229 MG/DL (ref 70–130)
GLUCOSE BLDC GLUCOMTR-MCNC: 297 MG/DL (ref 70–130)
GLUCOSE BLDC GLUCOMTR-MCNC: 309 MG/DL (ref 70–130)
GLUCOSE BLDC GLUCOMTR-MCNC: 316 MG/DL (ref 70–130)
HCT VFR BLD AUTO: 39 % (ref 35–45)
HGB BLD-MCNC: 14.1 G/DL (ref 12–15.5)
IMM GRANULOCYTES # BLD: 0.01 10*3/MM3 (ref 0–0.02)
IMM GRANULOCYTES NFR BLD: 0.2 % (ref 0–0.5)
LYMPHOCYTES # BLD AUTO: 1.35 10*3/MM3 (ref 0.6–4.2)
LYMPHOCYTES NFR BLD AUTO: 25.2 % (ref 10–50)
MCH RBC QN AUTO: 34.5 PG (ref 26.5–34)
MCHC RBC AUTO-ENTMCNC: 36.2 G/DL (ref 31.4–36)
MCV RBC AUTO: 95.4 FL (ref 80–98)
MONOCYTES # BLD AUTO: 0.57 10*3/MM3 (ref 0–0.9)
MONOCYTES NFR BLD AUTO: 10.7 % (ref 0–12)
NEUTROPHILS # BLD AUTO: 3.31 10*3/MM3 (ref 2–8.6)
NEUTROPHILS NFR BLD AUTO: 61.8 % (ref 37–80)
PLATELET # BLD AUTO: 188 10*3/MM3 (ref 150–450)
PMV BLD AUTO: 10.1 FL (ref 8–12)
POTASSIUM BLD-SCNC: 3.8 MMOL/L (ref 3.5–5.1)
PROT SERPL-MCNC: 6.6 G/DL (ref 6.3–8.6)
RBC # BLD AUTO: 4.09 10*6/MM3 (ref 3.77–5.16)
SODIUM BLD-SCNC: 133 MMOL/L (ref 137–145)
WBC NRBC COR # BLD: 5.35 10*3/MM3 (ref 3.2–9.8)

## 2018-11-28 PROCEDURE — 82962 GLUCOSE BLOOD TEST: CPT

## 2018-11-28 PROCEDURE — 63710000001 INSULIN ASPART PER 5 UNITS: Performed by: INTERNAL MEDICINE

## 2018-11-28 PROCEDURE — 93005 ELECTROCARDIOGRAM TRACING: CPT | Performed by: PSYCHIATRY & NEUROLOGY

## 2018-11-28 PROCEDURE — 80053 COMPREHEN METABOLIC PANEL: CPT | Performed by: NURSE PRACTITIONER

## 2018-11-28 PROCEDURE — 90791 PSYCH DIAGNOSTIC EVALUATION: CPT | Performed by: PSYCHIATRY & NEUROLOGY

## 2018-11-28 PROCEDURE — G0378 HOSPITAL OBSERVATION PER HR: HCPCS

## 2018-11-28 PROCEDURE — 83605 ASSAY OF LACTIC ACID: CPT | Performed by: NURSE PRACTITIONER

## 2018-11-28 PROCEDURE — 85025 COMPLETE CBC W/AUTO DIFF WBC: CPT | Performed by: NURSE PRACTITIONER

## 2018-11-28 PROCEDURE — 93010 ELECTROCARDIOGRAM REPORT: CPT | Performed by: INTERNAL MEDICINE

## 2018-11-28 PROCEDURE — 63710000001 INSULIN DETEMIR PER 5 UNITS: Performed by: PSYCHIATRY & NEUROLOGY

## 2018-11-28 PROCEDURE — 94760 N-INVAS EAR/PLS OXIMETRY 1: CPT

## 2018-11-28 PROCEDURE — 63710000001 INSULIN ASPART PER 5 UNITS: Performed by: PSYCHIATRY & NEUROLOGY

## 2018-11-28 PROCEDURE — 94799 UNLISTED PULMONARY SVC/PX: CPT

## 2018-11-28 RX ORDER — ESCITALOPRAM OXALATE 10 MG/1
20 TABLET ORAL DAILY
Status: DISCONTINUED | OUTPATIENT
Start: 2018-11-28 | End: 2018-11-30 | Stop reason: HOSPADM

## 2018-11-28 RX ORDER — HYDROXYZINE PAMOATE 50 MG/1
50 CAPSULE ORAL EVERY 6 HOURS PRN
Status: DISCONTINUED | OUTPATIENT
Start: 2018-11-28 | End: 2018-11-30 | Stop reason: HOSPADM

## 2018-11-28 RX ORDER — ACETAMINOPHEN 325 MG/1
650 TABLET ORAL EVERY 4 HOURS PRN
Status: DISCONTINUED | OUTPATIENT
Start: 2018-11-28 | End: 2018-11-30 | Stop reason: HOSPADM

## 2018-11-28 RX ORDER — PANTOPRAZOLE SODIUM 40 MG/1
40 TABLET, DELAYED RELEASE ORAL EVERY MORNING
Status: DISCONTINUED | OUTPATIENT
Start: 2018-11-29 | End: 2018-11-30 | Stop reason: HOSPADM

## 2018-11-28 RX ORDER — ONDANSETRON 4 MG/1
4 TABLET, FILM COATED ORAL EVERY 6 HOURS PRN
Status: DISCONTINUED | OUTPATIENT
Start: 2018-11-28 | End: 2018-11-30 | Stop reason: HOSPADM

## 2018-11-28 RX ORDER — NICOTINE 21 MG/24HR
1 PATCH, TRANSDERMAL 24 HOURS TRANSDERMAL EVERY 24 HOURS
Status: DISCONTINUED | OUTPATIENT
Start: 2018-11-28 | End: 2018-11-28

## 2018-11-28 RX ORDER — NICOTINE 21 MG/24HR
1 PATCH, TRANSDERMAL 24 HOURS TRANSDERMAL EVERY 24 HOURS
Status: DISCONTINUED | OUTPATIENT
Start: 2018-11-29 | End: 2018-11-30 | Stop reason: HOSPADM

## 2018-11-28 RX ORDER — NALTREXONE HYDROCHLORIDE 50 MG/1
25 TABLET, FILM COATED ORAL DAILY
Status: COMPLETED | OUTPATIENT
Start: 2018-11-28 | End: 2018-11-28

## 2018-11-28 RX ORDER — FOLIC ACID 1 MG/1
1 TABLET ORAL DAILY
Status: DISCONTINUED | OUTPATIENT
Start: 2018-11-29 | End: 2018-11-30 | Stop reason: HOSPADM

## 2018-11-28 RX ORDER — DEXTROSE MONOHYDRATE 25 G/50ML
25 INJECTION, SOLUTION INTRAVENOUS
Status: DISCONTINUED | OUTPATIENT
Start: 2018-11-28 | End: 2018-11-30 | Stop reason: HOSPADM

## 2018-11-28 RX ORDER — LORAZEPAM 2 MG/ML
2 INJECTION INTRAMUSCULAR
Status: DISCONTINUED | OUTPATIENT
Start: 2018-11-28 | End: 2018-11-30 | Stop reason: HOSPADM

## 2018-11-28 RX ORDER — NALTREXONE HYDROCHLORIDE 50 MG/1
50 TABLET, FILM COATED ORAL DAILY
Status: DISCONTINUED | OUTPATIENT
Start: 2018-11-29 | End: 2018-11-30 | Stop reason: HOSPADM

## 2018-11-28 RX ORDER — LORAZEPAM 2 MG/ML
1 INJECTION INTRAMUSCULAR
Status: DISCONTINUED | OUTPATIENT
Start: 2018-11-28 | End: 2018-11-30 | Stop reason: HOSPADM

## 2018-11-28 RX ORDER — LORAZEPAM 1 MG/1
1 TABLET ORAL
Status: DISCONTINUED | OUTPATIENT
Start: 2018-11-28 | End: 2018-11-30 | Stop reason: HOSPADM

## 2018-11-28 RX ORDER — LORAZEPAM 2 MG/1
2 TABLET ORAL
Status: DISCONTINUED | OUTPATIENT
Start: 2018-11-28 | End: 2018-11-30 | Stop reason: HOSPADM

## 2018-11-28 RX ORDER — TRAZODONE HYDROCHLORIDE 50 MG/1
50 TABLET ORAL NIGHTLY PRN
Status: DISCONTINUED | OUTPATIENT
Start: 2018-11-28 | End: 2018-11-30 | Stop reason: HOSPADM

## 2018-11-28 RX ORDER — MULTIVITAMIN,THERAPEUTIC
1 TABLET ORAL DAILY
Status: DISCONTINUED | OUTPATIENT
Start: 2018-11-29 | End: 2018-11-30 | Stop reason: HOSPADM

## 2018-11-28 RX ORDER — LOPERAMIDE HYDROCHLORIDE 2 MG/1
2 CAPSULE ORAL 4 TIMES DAILY PRN
Status: DISCONTINUED | OUTPATIENT
Start: 2018-11-28 | End: 2018-11-30 | Stop reason: HOSPADM

## 2018-11-28 RX ORDER — ALUMINA, MAGNESIA, AND SIMETHICONE 2400; 2400; 240 MG/30ML; MG/30ML; MG/30ML
15 SUSPENSION ORAL EVERY 6 HOURS PRN
Status: DISCONTINUED | OUTPATIENT
Start: 2018-11-28 | End: 2018-11-30 | Stop reason: HOSPADM

## 2018-11-28 RX ORDER — THIAMINE MONONITRATE (VIT B1) 100 MG
100 TABLET ORAL DAILY
Status: DISCONTINUED | OUTPATIENT
Start: 2018-11-29 | End: 2018-11-30 | Stop reason: HOSPADM

## 2018-11-28 RX ORDER — CLONIDINE HYDROCHLORIDE 0.1 MG/1
0.1 TABLET ORAL EVERY 4 HOURS PRN
Status: DISCONTINUED | OUTPATIENT
Start: 2018-11-28 | End: 2018-11-30 | Stop reason: HOSPADM

## 2018-11-28 RX ORDER — NICOTINE POLACRILEX 4 MG
15 LOZENGE BUCCAL
Status: DISCONTINUED | OUTPATIENT
Start: 2018-11-28 | End: 2018-11-30 | Stop reason: HOSPADM

## 2018-11-28 RX ORDER — THIAMINE MONONITRATE (VIT B1) 100 MG
100 TABLET ORAL ONCE
Status: DISCONTINUED | OUTPATIENT
Start: 2018-11-28 | End: 2018-11-28

## 2018-11-28 RX ORDER — ALBUTEROL SULFATE 2.5 MG/3ML
2.5 SOLUTION RESPIRATORY (INHALATION) EVERY 6 HOURS PRN
Status: DISCONTINUED | OUTPATIENT
Start: 2018-11-28 | End: 2018-11-30 | Stop reason: HOSPADM

## 2018-11-28 RX ADMIN — Medication 100 MG: at 08:06

## 2018-11-28 RX ADMIN — INSULIN ASPART 10 UNITS: 100 INJECTION, SOLUTION INTRAVENOUS; SUBCUTANEOUS at 11:25

## 2018-11-28 RX ADMIN — LORAZEPAM 1 MG: 1 TABLET ORAL at 06:16

## 2018-11-28 RX ADMIN — ESCITALOPRAM OXALATE 20 MG: 10 TABLET ORAL at 08:05

## 2018-11-28 RX ADMIN — PANTOPRAZOLE SODIUM 40 MG: 40 TABLET, DELAYED RELEASE ORAL at 06:15

## 2018-11-28 RX ADMIN — INSULIN DETEMIR 10 UNITS: 100 INJECTION, SOLUTION SUBCUTANEOUS at 20:47

## 2018-11-28 RX ADMIN — ALBUTEROL SULFATE 2.5 MG: 2.5 SOLUTION RESPIRATORY (INHALATION) at 07:02

## 2018-11-28 RX ADMIN — INSULIN ASPART 5 UNITS: 100 INJECTION, SOLUTION INTRAVENOUS; SUBCUTANEOUS at 20:47

## 2018-11-28 RX ADMIN — METFORMIN HYDROCHLORIDE 1000 MG: 500 TABLET, FILM COATED ORAL at 17:03

## 2018-11-28 RX ADMIN — TRAZODONE HYDROCHLORIDE 50 MG: 50 TABLET ORAL at 20:57

## 2018-11-28 RX ADMIN — INSULIN ASPART 8 UNITS: 100 INJECTION, SOLUTION INTRAVENOUS; SUBCUTANEOUS at 08:06

## 2018-11-28 RX ADMIN — Medication 25 MG: at 17:03

## 2018-11-28 RX ADMIN — INSULIN ASPART 10 UNITS: 100 INJECTION, SOLUTION INTRAVENOUS; SUBCUTANEOUS at 17:03

## 2018-11-28 RX ADMIN — NALTREXONE HYDROCHLORIDE 50 MG: 50 TABLET, FILM COATED ORAL at 08:05

## 2018-11-28 RX ADMIN — ESCITALOPRAM OXALATE 20 MG: 10 TABLET ORAL at 17:03

## 2018-11-29 LAB
FOLATE SERPL-MCNC: 15.3 NG/ML (ref 2.76–21)
GLUCOSE BLDC GLUCOMTR-MCNC: 149 MG/DL (ref 70–130)
GLUCOSE BLDC GLUCOMTR-MCNC: 176 MG/DL (ref 70–130)
GLUCOSE BLDC GLUCOMTR-MCNC: 235 MG/DL (ref 70–130)
GLUCOSE BLDC GLUCOMTR-MCNC: 275 MG/DL (ref 70–130)
VIT B12 BLD-MCNC: 449 PG/ML (ref 239–931)

## 2018-11-29 PROCEDURE — 99232 SBSQ HOSP IP/OBS MODERATE 35: CPT | Performed by: PSYCHIATRY & NEUROLOGY

## 2018-11-29 PROCEDURE — 82746 ASSAY OF FOLIC ACID SERUM: CPT | Performed by: PSYCHIATRY & NEUROLOGY

## 2018-11-29 PROCEDURE — 63710000001 INSULIN ASPART PER 5 UNITS: Performed by: PSYCHIATRY & NEUROLOGY

## 2018-11-29 PROCEDURE — 63710000001 INSULIN DETEMIR PER 5 UNITS: Performed by: PSYCHIATRY & NEUROLOGY

## 2018-11-29 PROCEDURE — 82962 GLUCOSE BLOOD TEST: CPT

## 2018-11-29 PROCEDURE — 82607 VITAMIN B-12: CPT | Performed by: PSYCHIATRY & NEUROLOGY

## 2018-11-29 RX ADMIN — THERA TABS 1 TABLET: TAB at 08:21

## 2018-11-29 RX ADMIN — INSULIN DETEMIR 10 UNITS: 100 INJECTION, SOLUTION SUBCUTANEOUS at 20:35

## 2018-11-29 RX ADMIN — METFORMIN HYDROCHLORIDE 1000 MG: 500 TABLET, FILM COATED ORAL at 08:21

## 2018-11-29 RX ADMIN — TRAZODONE HYDROCHLORIDE 50 MG: 50 TABLET ORAL at 20:35

## 2018-11-29 RX ADMIN — FOLIC ACID 1 MG: 1 TABLET ORAL at 08:21

## 2018-11-29 RX ADMIN — INSULIN ASPART 5 UNITS: 100 INJECTION, SOLUTION INTRAVENOUS; SUBCUTANEOUS at 11:58

## 2018-11-29 RX ADMIN — NICOTINE 1 PATCH: 21 PATCH TRANSDERMAL at 08:21

## 2018-11-29 RX ADMIN — INSULIN ASPART 8 UNITS: 100 INJECTION, SOLUTION INTRAVENOUS; SUBCUTANEOUS at 16:37

## 2018-11-29 RX ADMIN — NALTREXONE HYDROCHLORIDE 50 MG: 50 TABLET, FILM COATED ORAL at 08:21

## 2018-11-29 RX ADMIN — Medication 100 MG: at 08:21

## 2018-11-29 RX ADMIN — ESCITALOPRAM OXALATE 20 MG: 10 TABLET ORAL at 08:21

## 2018-11-29 RX ADMIN — PANTOPRAZOLE SODIUM 40 MG: 40 TABLET, DELAYED RELEASE ORAL at 06:01

## 2018-11-29 RX ADMIN — INSULIN ASPART 3 UNITS: 100 INJECTION, SOLUTION INTRAVENOUS; SUBCUTANEOUS at 20:35

## 2018-11-29 RX ADMIN — METFORMIN HYDROCHLORIDE 1000 MG: 500 TABLET, FILM COATED ORAL at 17:22

## 2018-11-30 VITALS
HEIGHT: 66 IN | TEMPERATURE: 98.8 F | SYSTOLIC BLOOD PRESSURE: 140 MMHG | WEIGHT: 190.04 LBS | RESPIRATION RATE: 16 BRPM | HEART RATE: 76 BPM | OXYGEN SATURATION: 99 % | BODY MASS INDEX: 30.54 KG/M2 | DIASTOLIC BLOOD PRESSURE: 100 MMHG

## 2018-11-30 PROBLEM — R45.851 SUICIDAL IDEATION: Status: RESOLVED | Noted: 2018-11-28 | Resolved: 2018-11-30

## 2018-11-30 LAB
ARTICHOKE IGE QN: 95 MG/DL (ref 1–129)
CHOLEST SERPL-MCNC: 160 MG/DL (ref 0–199)
GLUCOSE BLDC GLUCOMTR-MCNC: 175 MG/DL (ref 70–130)
GLUCOSE P FAST SERPL-MCNC: 194 MG/DL (ref 60–110)
HDLC SERPL-MCNC: 58 MG/DL (ref 60–200)
LDLC/HDLC SERPL: 1.36 {RATIO} (ref 0–3.22)
TRIGL SERPL-MCNC: 116 MG/DL (ref 20–199)

## 2018-11-30 PROCEDURE — 80061 LIPID PANEL: CPT | Performed by: PSYCHIATRY & NEUROLOGY

## 2018-11-30 PROCEDURE — 63710000001 INSULIN ASPART PER 5 UNITS: Performed by: PSYCHIATRY & NEUROLOGY

## 2018-11-30 PROCEDURE — 99238 HOSP IP/OBS DSCHRG MGMT 30/<: CPT | Performed by: PSYCHIATRY & NEUROLOGY

## 2018-11-30 PROCEDURE — 82962 GLUCOSE BLOOD TEST: CPT

## 2018-11-30 PROCEDURE — 82947 ASSAY GLUCOSE BLOOD QUANT: CPT | Performed by: PSYCHIATRY & NEUROLOGY

## 2018-11-30 RX ORDER — ESCITALOPRAM OXALATE 20 MG/1
20 TABLET ORAL DAILY
Qty: 30 TABLET | Refills: 0 | Status: SHIPPED | OUTPATIENT
Start: 2018-11-30 | End: 2021-01-13

## 2018-11-30 RX ORDER — NALTREXONE HYDROCHLORIDE 50 MG/1
50 TABLET, FILM COATED ORAL DAILY
Qty: 30 TABLET | Refills: 0 | Status: SHIPPED | OUTPATIENT
Start: 2018-12-01 | End: 2021-01-06 | Stop reason: HOSPADM

## 2018-11-30 RX ORDER — TRAZODONE HYDROCHLORIDE 50 MG/1
50 TABLET ORAL NIGHTLY PRN
Qty: 30 TABLET | Refills: 0 | Status: SHIPPED | OUTPATIENT
Start: 2018-11-30 | End: 2021-01-13

## 2018-11-30 RX ADMIN — PANTOPRAZOLE SODIUM 40 MG: 40 TABLET, DELAYED RELEASE ORAL at 06:04

## 2018-11-30 RX ADMIN — METFORMIN HYDROCHLORIDE 1000 MG: 500 TABLET, FILM COATED ORAL at 08:09

## 2018-11-30 RX ADMIN — NALTREXONE HYDROCHLORIDE 50 MG: 50 TABLET, FILM COATED ORAL at 08:10

## 2018-11-30 RX ADMIN — THERA TABS 1 TABLET: TAB at 08:09

## 2018-11-30 RX ADMIN — ESCITALOPRAM OXALATE 20 MG: 10 TABLET ORAL at 08:10

## 2018-11-30 RX ADMIN — FOLIC ACID 1 MG: 1 TABLET ORAL at 08:09

## 2018-11-30 RX ADMIN — INSULIN ASPART 3 UNITS: 100 INJECTION, SOLUTION INTRAVENOUS; SUBCUTANEOUS at 08:10

## 2018-11-30 RX ADMIN — Medication 100 MG: at 08:10

## 2019-01-26 ENCOUNTER — HOSPITAL ENCOUNTER (EMERGENCY)
Facility: HOSPITAL | Age: 39
Discharge: HOME | End: 2019-01-26
Admitting: NURSE PRACTITIONER

## 2019-01-26 ENCOUNTER — APPOINTMENT (OUTPATIENT)
Dept: ULTRASOUND IMAGING | Facility: HOSPITAL | Age: 39
End: 2019-01-26

## 2019-01-26 VITALS
TEMPERATURE: 96.9 F | DIASTOLIC BLOOD PRESSURE: 106 MMHG | RESPIRATION RATE: 20 BRPM | SYSTOLIC BLOOD PRESSURE: 188 MMHG | HEART RATE: 88 BPM | HEIGHT: 66 IN | WEIGHT: 194.8 LBS | BODY MASS INDEX: 31.31 KG/M2 | OXYGEN SATURATION: 98 %

## 2019-01-26 DIAGNOSIS — M79.605 LEFT LEG PAIN: Primary | ICD-10-CM

## 2019-01-26 PROCEDURE — 96372 THER/PROPH/DIAG INJ SC/IM: CPT

## 2019-01-26 PROCEDURE — 93971 EXTREMITY STUDY: CPT

## 2019-01-26 PROCEDURE — 25010000002 KETOROLAC TROMETHAMINE PER 15 MG: Performed by: NURSE PRACTITIONER

## 2019-01-26 PROCEDURE — 99283 EMERGENCY DEPT VISIT LOW MDM: CPT

## 2019-01-26 RX ORDER — KETOROLAC TROMETHAMINE 30 MG/ML
60 INJECTION, SOLUTION INTRAMUSCULAR; INTRAVENOUS ONCE
Status: COMPLETED | OUTPATIENT
Start: 2019-01-26 | End: 2019-01-26

## 2019-01-26 RX ADMIN — KETOROLAC TROMETHAMINE 60 MG: 60 INJECTION, SOLUTION INTRAMUSCULAR at 21:24

## 2019-01-27 NOTE — ED PROVIDER NOTES
Subjective   38-year-old female emergency department today complaining of left leg pain.  Patient reports a 3 day onset.  Pain noted and the upper left leg, pain worse with ambulation.  Denies any type of injury or trauma.        History provided by:  Patient   used: No        Review of Systems   Constitutional: Negative for fatigue and fever.   HENT: Negative for dental problem and postnasal drip.    Eyes: Negative for visual disturbance.   Respiratory: Negative for shortness of breath.    Cardiovascular: Negative for chest pain and palpitations.   Gastrointestinal: Negative for nausea.   Musculoskeletal: Positive for arthralgias.   Skin: Negative for rash.   Allergic/Immunologic: Negative for immunocompromised state.   Neurological: Negative for weakness.   Hematological: Negative for adenopathy.   Psychiatric/Behavioral: Negative for confusion.   All other systems reviewed and are negative.      Past Medical History:   Diagnosis Date   • Acute bronchitis    • Acute conjunctivitis     left   • Acute pharyngitis    • Alcohol abuse    • Allergic rhinitis due to pollen    • Anxiety state    • Asthma    • Constipation    • Depressive disorder    • Diarrhea    • GERD (gastroesophageal reflux disease)    • Hypertensive disorder    • Insomnia    • Knee pain    • Migraine    • Nausea    • Nausea and vomiting    • Psychiatric illness    • Rib pain    • Tobacco dependence syndrome    • Type 2 diabetes mellitus (CMS/HCC)     uncontrolled   • URI (upper respiratory infection)    • Viral intestinal infection, unspecified    • Wheezing        Allergies   Allergen Reactions   • Lisinopril Cough     Is currently prescribed lisinopril for her HTN, but isn't taking it due to cough; hasn't been back to see the MD   • Penicillins Unknown (See Comments)     Pt was five and unsure of reaction       Past Surgical History:   Procedure Laterality Date   • ABDOMINAL SURGERY     •  SECTION     • INJECTION OF  MEDICATION  2016    Kenalog   • INJECTION OF MEDICATION  2015    Toradol   • PAP SMEAR  2010   • TUBAL ABDOMINAL LIGATION         Family History   Problem Relation Age of Onset   • Drug abuse Mother         addicted to pain pills,  from overdose   • Suicide Attempts Mother    • Heart attack Father    • Alcohol abuse Father    • Hepatitis Sister         IV drug use   • Drug abuse Sister    • Bipolar disorder Sister    • Colon cancer Maternal Grandmother          at age 19   • Heart disease Maternal Grandfather    • Tuberculosis Paternal Grandfather    • ADD / ADHD Son        Social History     Socioeconomic History   • Marital status: Single     Spouse name: Not on file   • Number of children: 2   • Years of education: 12   • Highest education level: Not on file   Occupational History   • Occupation: unemployed   Tobacco Use   • Smoking status: Current Every Day Smoker     Packs/day: 1.00     Years: 8.00     Pack years: 8.00     Types: Cigarettes   • Smokeless tobacco: Never Used   Substance and Sexual Activity   • Alcohol use: Yes     Alcohol/week: 3.6 oz     Types: 6 Cans of beer per week   • Drug use: No     Comment: when was younger   • Sexual activity: No     Birth control/protection: Surgical   Social History Narrative    Substance Abuse: Alcohol: daily heavy use until about 3-4 months ago then relapsed yesterday with level of 264,  Cannabis: use in teens, Methamphetamine: tried it once at age 23, Opiate: does not use, Cocaine: does not use, Synthetic: does not use and IV drug use: denies        Marriages: 0    Current Relationships: Single    Children: 2 (18 and 21); good relationship with 21yr old; both by same father        Education: a couple years of college     Occupation: individual, not currently working    Living Situation: was living with son           Objective   Physical Exam   Constitutional: She is oriented to person, place, and time. Vital signs are normal. She  appears well-developed and well-nourished.   HENT:   Head: Normocephalic.   Nose: Nose normal.   Eyes: Conjunctivae are normal. Pupils are equal, round, and reactive to light.   Neck: Normal range of motion.   Cardiovascular: Normal rate, regular rhythm and normal heart sounds.   Pulmonary/Chest: Effort normal and breath sounds normal.   Abdominal: Soft.   Musculoskeletal:        Left upper leg: She exhibits tenderness.        Legs:  Neurological: She is alert and oriented to person, place, and time. GCS eye subscore is 4. GCS verbal subscore is 5. GCS motor subscore is 6.   Skin: Skin is warm and dry.   Psychiatric: She has a normal mood and affect.   Nursing note and vitals reviewed.      Procedures           ED Course  ED Course as of Jan 26 2112   Sat Jan 26, 2019 1944 Ultrasound ordered   [JL]      ED Course User Index  [JL] Arnaldo Castillo APRN      ..  US Venous Doppler Lower Extremity Left (duplex)   Final Result   CONCLUSION:   No ultrasound evidence of deep venous thrombosis of the left   lower extremity.      [id number]      Electronically signed by:  Greg Witt MD  1/26/2019 8:42 PM CST   Workstation: [id number]        Discussed with patient to follow up with pcp relating to sciatica vs nueropathy             MDM      Final diagnoses:   Left leg pain            Arnaldo Castillo APRN  01/26/19 2112

## 2020-09-17 ENCOUNTER — APPOINTMENT (OUTPATIENT)
Dept: GENERAL RADIOLOGY | Facility: HOSPITAL | Age: 40
End: 2020-09-17

## 2020-09-17 ENCOUNTER — HOSPITAL ENCOUNTER (EMERGENCY)
Facility: HOSPITAL | Age: 40
Discharge: HOME | End: 2020-09-17
Attending: EMERGENCY MEDICINE | Admitting: EMERGENCY MEDICINE

## 2020-09-17 VITALS
WEIGHT: 194.89 LBS | TEMPERATURE: 97.7 F | OXYGEN SATURATION: 96 % | RESPIRATION RATE: 18 BRPM | HEART RATE: 93 BPM | BODY MASS INDEX: 30.59 KG/M2 | SYSTOLIC BLOOD PRESSURE: 133 MMHG | HEIGHT: 67 IN | DIASTOLIC BLOOD PRESSURE: 61 MMHG

## 2020-09-17 DIAGNOSIS — F10.920 ALCOHOLIC INTOXICATION WITHOUT COMPLICATION (HCC): Primary | ICD-10-CM

## 2020-09-17 LAB
ALBUMIN SERPL-MCNC: 4.6 G/DL (ref 3.5–5.2)
ALBUMIN/GLOB SERPL: 1.4 G/DL
ALP SERPL-CCNC: 68 U/L (ref 39–117)
ALT SERPL W P-5'-P-CCNC: 111 U/L (ref 1–33)
AMPHET+METHAMPHET UR QL: NEGATIVE
AMPHETAMINES UR QL: NEGATIVE
ANION GAP SERPL CALCULATED.3IONS-SCNC: 14 MMOL/L (ref 5–15)
ARTERIAL PATENCY WRIST A: ABNORMAL
AST SERPL-CCNC: 81 U/L (ref 1–32)
ATMOSPHERIC PRESS: 747 MMHG
B-HCG UR QL: NEGATIVE
BACTERIA UR QL AUTO: ABNORMAL /HPF
BARBITURATES UR QL SCN: NEGATIVE
BASE EXCESS BLDA CALC-SCNC: -6.7 MMOL/L (ref 0–2)
BASOPHILS # BLD AUTO: 0.03 10*3/MM3 (ref 0–0.2)
BASOPHILS NFR BLD AUTO: 0.5 % (ref 0–1.5)
BDY SITE: ABNORMAL
BENZODIAZ UR QL SCN: NEGATIVE
BILIRUB SERPL-MCNC: 0.2 MG/DL (ref 0–1.2)
BILIRUB UR QL STRIP: NEGATIVE
BUN SERPL-MCNC: 7 MG/DL (ref 6–20)
BUN/CREAT SERPL: 9.7 (ref 7–25)
BUPRENORPHINE SERPL-MCNC: NEGATIVE NG/ML
CALCIUM SPEC-SCNC: 9.2 MG/DL (ref 8.6–10.5)
CANNABINOIDS SERPL QL: NEGATIVE
CHLORIDE SERPL-SCNC: 101 MMOL/L (ref 98–107)
CLARITY UR: CLEAR
CO2 SERPL-SCNC: 22 MMOL/L (ref 22–29)
COCAINE UR QL: NEGATIVE
COHGB MFR BLD: 3.9 %
COLOR UR: YELLOW
CREAT SERPL-MCNC: 0.72 MG/DL (ref 0.57–1)
DEPRECATED RDW RBC AUTO: 41.9 FL (ref 37–54)
EOSINOPHIL # BLD AUTO: 0.1 10*3/MM3 (ref 0–0.4)
EOSINOPHIL NFR BLD AUTO: 1.7 % (ref 0.3–6.2)
ERYTHROCYTE [DISTWIDTH] IN BLOOD BY AUTOMATED COUNT: 12.1 % (ref 12.3–15.4)
ETHANOL BLD-MCNC: 339 MG/DL (ref 0–10)
ETHANOL UR QL: 0.34 %
GFR SERPL CREATININE-BSD FRML MDRD: 89 ML/MIN/1.73
GLOBULIN UR ELPH-MCNC: 3.3 GM/DL
GLUCOSE SERPL-MCNC: 252 MG/DL (ref 65–99)
GLUCOSE UR STRIP-MCNC: ABNORMAL MG/DL
HCO3 BLDA-SCNC: 18.8 MMOL/L (ref 20–26)
HCT VFR BLD AUTO: 41.3 % (ref 34–46.6)
HGB BLD-MCNC: 14.1 G/DL (ref 12–15.9)
HGB UR QL STRIP.AUTO: ABNORMAL
HOLD SPECIMEN: NORMAL
HOLD SPECIMEN: NORMAL
HYALINE CASTS UR QL AUTO: ABNORMAL /LPF
IMM GRANULOCYTES # BLD AUTO: 0.02 10*3/MM3 (ref 0–0.05)
IMM GRANULOCYTES NFR BLD AUTO: 0.3 % (ref 0–0.5)
KETONES UR QL STRIP: NEGATIVE
LEUKOCYTE ESTERASE UR QL STRIP.AUTO: NEGATIVE
LYMPHOCYTES # BLD AUTO: 1.91 10*3/MM3 (ref 0.7–3.1)
LYMPHOCYTES NFR BLD AUTO: 32.9 % (ref 19.6–45.3)
Lab: ABNORMAL
MCH RBC QN AUTO: 32.6 PG (ref 26.6–33)
MCHC RBC AUTO-ENTMCNC: 34.1 G/DL (ref 31.5–35.7)
MCV RBC AUTO: 95.4 FL (ref 79–97)
METHADONE UR QL SCN: NEGATIVE
MODALITY: ABNORMAL
MONOCYTES # BLD AUTO: 0.48 10*3/MM3 (ref 0.1–0.9)
MONOCYTES NFR BLD AUTO: 8.3 % (ref 5–12)
NEUTROPHILS NFR BLD AUTO: 3.26 10*3/MM3 (ref 1.7–7)
NEUTROPHILS NFR BLD AUTO: 56.3 % (ref 42.7–76)
NITRITE UR QL STRIP: NEGATIVE
NRBC BLD AUTO-RTO: 0 /100 WBC (ref 0–0.2)
NT-PROBNP SERPL-MCNC: 91.8 PG/ML (ref 0–450)
OPIATES UR QL: NEGATIVE
OXYCODONE UR QL SCN: NEGATIVE
PCO2 BLDA: 37.1 MM HG (ref 35–45)
PCP UR QL SCN: NEGATIVE
PH BLDA: 7.31 PH UNITS (ref 7.35–7.45)
PH UR STRIP.AUTO: 5.5 [PH] (ref 5–9)
PLATELET # BLD AUTO: 258 10*3/MM3 (ref 140–450)
PMV BLD AUTO: 10.6 FL (ref 6–12)
PO2 BLDA: 93.7 MM HG (ref 83–108)
POTASSIUM SERPL-SCNC: 4.3 MMOL/L (ref 3.5–5.2)
PROPOXYPH UR QL: NEGATIVE
PROT SERPL-MCNC: 7.9 G/DL (ref 6–8.5)
PROT UR QL STRIP: NEGATIVE
RBC # BLD AUTO: 4.33 10*6/MM3 (ref 3.77–5.28)
RBC # UR: ABNORMAL /HPF
REF LAB TEST METHOD: ABNORMAL
SAO2 % BLDCOA: 96.8 % (ref 94–99)
SODIUM SERPL-SCNC: 137 MMOL/L (ref 136–145)
SP GR UR STRIP: 1 (ref 1–1.03)
SQUAMOUS #/AREA URNS HPF: ABNORMAL /HPF
TRICYCLICS UR QL SCN: NEGATIVE
TROPONIN T SERPL-MCNC: <0.01 NG/ML (ref 0–0.03)
UROBILINOGEN UR QL STRIP: ABNORMAL
VENTILATOR MODE: ABNORMAL
WBC # BLD AUTO: 5.8 10*3/MM3 (ref 3.4–10.8)
WBC UR QL AUTO: ABNORMAL /HPF
WHOLE BLOOD HOLD SPECIMEN: NORMAL
WHOLE BLOOD HOLD SPECIMEN: NORMAL

## 2020-09-17 PROCEDURE — 81001 URINALYSIS AUTO W/SCOPE: CPT | Performed by: EMERGENCY MEDICINE

## 2020-09-17 PROCEDURE — 93010 ELECTROCARDIOGRAM REPORT: CPT | Performed by: INTERNAL MEDICINE

## 2020-09-17 PROCEDURE — 83880 ASSAY OF NATRIURETIC PEPTIDE: CPT | Performed by: EMERGENCY MEDICINE

## 2020-09-17 PROCEDURE — 81025 URINE PREGNANCY TEST: CPT | Performed by: EMERGENCY MEDICINE

## 2020-09-17 PROCEDURE — 80053 COMPREHEN METABOLIC PANEL: CPT | Performed by: EMERGENCY MEDICINE

## 2020-09-17 PROCEDURE — 84484 ASSAY OF TROPONIN QUANT: CPT | Performed by: EMERGENCY MEDICINE

## 2020-09-17 PROCEDURE — 85025 COMPLETE CBC W/AUTO DIFF WBC: CPT | Performed by: EMERGENCY MEDICINE

## 2020-09-17 PROCEDURE — 82375 ASSAY CARBOXYHB QUANT: CPT | Performed by: EMERGENCY MEDICINE

## 2020-09-17 PROCEDURE — 99283 EMERGENCY DEPT VISIT LOW MDM: CPT

## 2020-09-17 PROCEDURE — 71045 X-RAY EXAM CHEST 1 VIEW: CPT

## 2020-09-17 PROCEDURE — 80307 DRUG TEST PRSMV CHEM ANLYZR: CPT | Performed by: EMERGENCY MEDICINE

## 2020-09-17 PROCEDURE — 93005 ELECTROCARDIOGRAM TRACING: CPT | Performed by: EMERGENCY MEDICINE

## 2020-09-17 RX ORDER — SODIUM CHLORIDE 0.9 % (FLUSH) 0.9 %
10 SYRINGE (ML) INJECTION AS NEEDED
Status: DISCONTINUED | OUTPATIENT
Start: 2020-09-17 | End: 2020-09-17 | Stop reason: HOSPADM

## 2020-09-17 RX ADMIN — SODIUM CHLORIDE 1000 ML: 900 INJECTION, SOLUTION INTRAVENOUS at 10:54
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
## 2020-09-17 NOTE — ED NOTES
Sitter at bedside, FELICIA Sawyer starting sitting at arrival of patient.     Janene Garner RN  09/17/20 6017

## 2021-01-03 ENCOUNTER — HOSPITAL ENCOUNTER (INPATIENT)
Facility: HOSPITAL | Age: 41
LOS: 2 days | Discharge: HOME | End: 2021-01-06
Attending: EMERGENCY MEDICINE | Admitting: INTERNAL MEDICINE

## 2021-01-03 DIAGNOSIS — E83.42 HYPOMAGNESEMIA: ICD-10-CM

## 2021-01-03 DIAGNOSIS — Z74.09 IMPAIRED MOBILITY AND ADLS: ICD-10-CM

## 2021-01-03 DIAGNOSIS — F10.939 ALCOHOL WITHDRAWAL SYNDROME WITH COMPLICATION (HCC): ICD-10-CM

## 2021-01-03 DIAGNOSIS — F41.0 PANIC ATTACK: ICD-10-CM

## 2021-01-03 DIAGNOSIS — Z74.09 IMPAIRED FUNCTIONAL MOBILITY, BALANCE, GAIT, AND ENDURANCE: ICD-10-CM

## 2021-01-03 DIAGNOSIS — E87.29 ALCOHOLIC KETOACIDOSIS: Primary | ICD-10-CM

## 2021-01-03 DIAGNOSIS — Z78.9 IMPAIRED MOBILITY AND ADLS: ICD-10-CM

## 2021-01-03 DIAGNOSIS — I16.0 HYPERTENSIVE URGENCY: ICD-10-CM

## 2021-01-03 LAB
ACETONE BLD QL: ABNORMAL
ALBUMIN SERPL-MCNC: 4.7 G/DL (ref 3.5–5.2)
ALBUMIN/GLOB SERPL: 1.2 G/DL
ALP SERPL-CCNC: 91 U/L (ref 39–117)
ALT SERPL W P-5'-P-CCNC: 27 U/L (ref 1–33)
AMPHET+METHAMPHET UR QL: NEGATIVE
AMPHETAMINES UR QL: NEGATIVE
ANION GAP SERPL CALCULATED.3IONS-SCNC: 21 MMOL/L (ref 5–15)
AST SERPL-CCNC: 27 U/L (ref 1–32)
BACTERIA UR QL AUTO: ABNORMAL /HPF
BARBITURATES UR QL SCN: NEGATIVE
BASOPHILS # BLD AUTO: 0.04 10*3/MM3 (ref 0–0.2)
BASOPHILS NFR BLD AUTO: 0.3 % (ref 0–1.5)
BENZODIAZ UR QL SCN: NEGATIVE
BILIRUB SERPL-MCNC: 1 MG/DL (ref 0–1.2)
BILIRUB UR QL STRIP: ABNORMAL
BUN SERPL-MCNC: 15 MG/DL (ref 6–20)
BUN/CREAT SERPL: 16.5 (ref 7–25)
BUPRENORPHINE SERPL-MCNC: NEGATIVE NG/ML
CALCIUM SPEC-SCNC: 9.6 MG/DL (ref 8.6–10.5)
CANNABINOIDS SERPL QL: NEGATIVE
CHLORIDE SERPL-SCNC: 92 MMOL/L (ref 98–107)
CLARITY UR: CLEAR
CO2 SERPL-SCNC: 22 MMOL/L (ref 22–29)
COCAINE UR QL: NEGATIVE
COLOR UR: YELLOW
CREAT SERPL-MCNC: 0.91 MG/DL (ref 0.57–1)
DEPRECATED RDW RBC AUTO: 44.3 FL (ref 37–54)
EOSINOPHIL # BLD AUTO: 0 10*3/MM3 (ref 0–0.4)
EOSINOPHIL NFR BLD AUTO: 0 % (ref 0.3–6.2)
ERYTHROCYTE [DISTWIDTH] IN BLOOD BY AUTOMATED COUNT: 13.5 % (ref 12.3–15.4)
ETHANOL BLD-MCNC: <10 MG/DL (ref 0–10)
ETHANOL UR QL: <0.01 %
GFR SERPL CREATININE-BSD FRML MDRD: 68 ML/MIN/1.73
GLOBULIN UR ELPH-MCNC: 3.9 GM/DL
GLUCOSE SERPL-MCNC: 232 MG/DL (ref 65–99)
GLUCOSE UR STRIP-MCNC: ABNORMAL MG/DL
HCG SERPL QL: NEGATIVE
HCT VFR BLD AUTO: 41.5 % (ref 34–46.6)
HGB BLD-MCNC: 14.6 G/DL (ref 12–15.9)
HGB UR QL STRIP.AUTO: ABNORMAL
HOLD SPECIMEN: NORMAL
HOLD SPECIMEN: NORMAL
HYALINE CASTS UR QL AUTO: ABNORMAL /LPF
IMM GRANULOCYTES # BLD AUTO: 0.05 10*3/MM3 (ref 0–0.05)
IMM GRANULOCYTES NFR BLD AUTO: 0.4 % (ref 0–0.5)
KETONES UR QL STRIP: ABNORMAL
LEUKOCYTE ESTERASE UR QL STRIP.AUTO: NEGATIVE
LIPASE SERPL-CCNC: 23 U/L (ref 13–60)
LYMPHOCYTES # BLD AUTO: 1.78 10*3/MM3 (ref 0.7–3.1)
LYMPHOCYTES NFR BLD AUTO: 14.7 % (ref 19.6–45.3)
MAGNESIUM SERPL-MCNC: 1.1 MG/DL (ref 1.6–2.6)
MCH RBC QN AUTO: 31.3 PG (ref 26.6–33)
MCHC RBC AUTO-ENTMCNC: 35.2 G/DL (ref 31.5–35.7)
MCV RBC AUTO: 88.9 FL (ref 79–97)
METHADONE UR QL SCN: NEGATIVE
MONOCYTES # BLD AUTO: 0.83 10*3/MM3 (ref 0.1–0.9)
MONOCYTES NFR BLD AUTO: 6.8 % (ref 5–12)
NEUTROPHILS NFR BLD AUTO: 77.8 % (ref 42.7–76)
NEUTROPHILS NFR BLD AUTO: 9.44 10*3/MM3 (ref 1.7–7)
NITRITE UR QL STRIP: NEGATIVE
NRBC BLD AUTO-RTO: 0 /100 WBC (ref 0–0.2)
OPIATES UR QL: NEGATIVE
OXYCODONE UR QL SCN: NEGATIVE
PCP UR QL SCN: NEGATIVE
PH UR STRIP.AUTO: 6 [PH] (ref 5–9)
PLATELET # BLD AUTO: 358 10*3/MM3 (ref 140–450)
PMV BLD AUTO: 9.6 FL (ref 6–12)
POTASSIUM SERPL-SCNC: 3.4 MMOL/L (ref 3.5–5.2)
PROPOXYPH UR QL: NEGATIVE
PROT SERPL-MCNC: 8.6 G/DL (ref 6–8.5)
PROT UR QL STRIP: ABNORMAL
RBC # BLD AUTO: 4.67 10*6/MM3 (ref 3.77–5.28)
RBC # UR: ABNORMAL /HPF
REF LAB TEST METHOD: ABNORMAL
SODIUM SERPL-SCNC: 135 MMOL/L (ref 136–145)
SP GR UR STRIP: 1.03 (ref 1–1.03)
SQUAMOUS #/AREA URNS HPF: ABNORMAL /HPF
TRICYCLICS UR QL SCN: NEGATIVE
TSH SERPL DL<=0.05 MIU/L-ACNC: 2.72 UIU/ML (ref 0.27–4.2)
UROBILINOGEN UR QL STRIP: ABNORMAL
WBC # BLD AUTO: 12.14 10*3/MM3 (ref 3.4–10.8)
WBC UR QL AUTO: ABNORMAL /HPF
WHOLE BLOOD HOLD SPECIMEN: NORMAL

## 2021-01-03 PROCEDURE — 82077 ASSAY SPEC XCP UR&BREATH IA: CPT | Performed by: NURSE PRACTITIONER

## 2021-01-03 PROCEDURE — 99285 EMERGENCY DEPT VISIT HI MDM: CPT

## 2021-01-03 PROCEDURE — 84703 CHORIONIC GONADOTROPIN ASSAY: CPT | Performed by: EMERGENCY MEDICINE

## 2021-01-03 PROCEDURE — 25010000002 ONDANSETRON PER 1 MG: Performed by: HOSPITALIST

## 2021-01-03 PROCEDURE — 25010000002 LORAZEPAM PER 2 MG: Performed by: NURSE PRACTITIONER

## 2021-01-03 PROCEDURE — 93005 ELECTROCARDIOGRAM TRACING: CPT | Performed by: EMERGENCY MEDICINE

## 2021-01-03 PROCEDURE — 82009 KETONE BODYS QUAL: CPT | Performed by: EMERGENCY MEDICINE

## 2021-01-03 PROCEDURE — 87636 SARSCOV2 & INF A&B AMP PRB: CPT | Performed by: EMERGENCY MEDICINE

## 2021-01-03 PROCEDURE — 84443 ASSAY THYROID STIM HORMONE: CPT | Performed by: NURSE PRACTITIONER

## 2021-01-03 PROCEDURE — 80306 DRUG TEST PRSMV INSTRMNT: CPT | Performed by: NURSE PRACTITIONER

## 2021-01-03 PROCEDURE — 93010 ELECTROCARDIOGRAM REPORT: CPT | Performed by: INTERNAL MEDICINE

## 2021-01-03 PROCEDURE — 25010000002 THIAMINE PER 100 MG: Performed by: NURSE PRACTITIONER

## 2021-01-03 PROCEDURE — 83735 ASSAY OF MAGNESIUM: CPT | Performed by: NURSE PRACTITIONER

## 2021-01-03 PROCEDURE — 80053 COMPREHEN METABOLIC PANEL: CPT | Performed by: NURSE PRACTITIONER

## 2021-01-03 PROCEDURE — 83690 ASSAY OF LIPASE: CPT | Performed by: NURSE PRACTITIONER

## 2021-01-03 PROCEDURE — 25010000002 ONDANSETRON PER 1 MG: Performed by: NURSE PRACTITIONER

## 2021-01-03 PROCEDURE — 81001 URINALYSIS AUTO W/SCOPE: CPT | Performed by: NURSE PRACTITIONER

## 2021-01-03 PROCEDURE — 25010000002 MAGNESIUM SULFATE 2 GM/50ML SOLUTION: Performed by: NURSE PRACTITIONER

## 2021-01-03 PROCEDURE — 85025 COMPLETE CBC W/AUTO DIFF WBC: CPT | Performed by: NURSE PRACTITIONER

## 2021-01-03 PROCEDURE — G0378 HOSPITAL OBSERVATION PER HR: HCPCS

## 2021-01-03 RX ORDER — LORAZEPAM 2 MG/ML
2 INJECTION INTRAMUSCULAR ONCE
Status: DISCONTINUED | OUTPATIENT
Start: 2021-01-03 | End: 2021-01-06 | Stop reason: HOSPADM

## 2021-01-03 RX ORDER — MAGNESIUM SULFATE HEPTAHYDRATE 40 MG/ML
2 INJECTION, SOLUTION INTRAVENOUS ONCE
Status: COMPLETED | OUTPATIENT
Start: 2021-01-03 | End: 2021-01-03

## 2021-01-03 RX ORDER — LABETALOL HYDROCHLORIDE 5 MG/ML
20 INJECTION, SOLUTION INTRAVENOUS ONCE
Status: COMPLETED | OUTPATIENT
Start: 2021-01-03 | End: 2021-01-03

## 2021-01-03 RX ORDER — DEXTROSE, SODIUM CHLORIDE, AND POTASSIUM CHLORIDE 5; .9; .15 G/100ML; G/100ML; G/100ML
125 INJECTION INTRAVENOUS CONTINUOUS
Status: DISCONTINUED | OUTPATIENT
Start: 2021-01-03 | End: 2021-01-04

## 2021-01-03 RX ORDER — SODIUM CHLORIDE 0.9 % (FLUSH) 0.9 %
10 SYRINGE (ML) INJECTION AS NEEDED
Status: DISCONTINUED | OUTPATIENT
Start: 2021-01-03 | End: 2021-01-06 | Stop reason: HOSPADM

## 2021-01-03 RX ORDER — MULTIPLE VITAMINS W/ MINERALS TAB 9MG-400MCG
1 TAB ORAL ONCE
Status: COMPLETED | OUTPATIENT
Start: 2021-01-03 | End: 2021-01-03

## 2021-01-03 RX ORDER — ONDANSETRON 2 MG/ML
4 INJECTION INTRAMUSCULAR; INTRAVENOUS ONCE
Status: COMPLETED | OUTPATIENT
Start: 2021-01-03 | End: 2021-01-03

## 2021-01-03 RX ORDER — LORAZEPAM 2 MG/ML
2 INJECTION INTRAMUSCULAR ONCE
Status: COMPLETED | OUTPATIENT
Start: 2021-01-03 | End: 2021-01-03

## 2021-01-03 RX ADMIN — LORAZEPAM 2 MG: 2 INJECTION, SOLUTION INTRAMUSCULAR; INTRAVENOUS at 20:13

## 2021-01-03 RX ADMIN — LABETALOL HYDROCHLORIDE 20 MG: 5 INJECTION INTRAVENOUS at 23:20

## 2021-01-03 RX ADMIN — THIAMINE HYDROCHLORIDE 100 MG: 100 INJECTION, SOLUTION INTRAMUSCULAR; INTRAVENOUS at 22:33

## 2021-01-03 RX ADMIN — ONDANSETRON HYDROCHLORIDE 4 MG: 2 INJECTION, SOLUTION INTRAMUSCULAR; INTRAVENOUS at 20:02

## 2021-01-03 RX ADMIN — SODIUM CHLORIDE 1000 ML: 9 INJECTION, SOLUTION INTRAVENOUS at 20:19

## 2021-01-03 RX ADMIN — I-VITE, TAB 1000-60-2MG (60/BT) 1 TABLET: TAB at 22:33

## 2021-01-03 RX ADMIN — MAGNESIUM SULFATE HEPTAHYDRATE 2 G: 40 INJECTION, SOLUTION INTRAVENOUS at 21:47

## 2021-01-03 RX ADMIN — FOLIC ACID 1 MG: 5 INJECTION, SOLUTION INTRAMUSCULAR; INTRAVENOUS; SUBCUTANEOUS at 22:35

## 2021-01-03 RX ADMIN — POTASSIUM CHLORIDE, DEXTROSE MONOHYDRATE AND SODIUM CHLORIDE 125 ML/HR: 150; 5; 900 INJECTION, SOLUTION INTRAVENOUS at 23:35

## 2021-01-03 RX ADMIN — ONDANSETRON HYDROCHLORIDE 4 MG: 2 INJECTION INTRAMUSCULAR; INTRAVENOUS at 23:51

## 2021-01-04 ENCOUNTER — ANESTHESIA EVENT (OUTPATIENT)
Dept: ICU | Facility: HOSPITAL | Age: 41
End: 2021-01-04

## 2021-01-04 ENCOUNTER — ANESTHESIA (OUTPATIENT)
Dept: ICU | Facility: HOSPITAL | Age: 41
End: 2021-01-04

## 2021-01-04 LAB
ALBUMIN SERPL-MCNC: 3.8 G/DL (ref 3.5–5.2)
ALBUMIN/GLOB SERPL: 1.2 G/DL
ALP SERPL-CCNC: 72 U/L (ref 39–117)
ALT SERPL W P-5'-P-CCNC: 19 U/L (ref 1–33)
AMYLASE SERPL-CCNC: 34 U/L (ref 28–100)
ANION GAP SERPL CALCULATED.3IONS-SCNC: 10 MMOL/L (ref 5–15)
ARTERIAL PATENCY WRIST A: ABNORMAL
AST SERPL-CCNC: 22 U/L (ref 1–32)
ATMOSPHERIC PRESS: 749 MMHG
BASE EXCESS BLDA CALC-SCNC: 1.4 MMOL/L (ref 0–2)
BASOPHILS # BLD AUTO: 0.02 10*3/MM3 (ref 0–0.2)
BASOPHILS NFR BLD AUTO: 0.3 % (ref 0–1.5)
BDY SITE: ABNORMAL
BILIRUB SERPL-MCNC: 0.9 MG/DL (ref 0–1.2)
BUN SERPL-MCNC: 10 MG/DL (ref 6–20)
BUN/CREAT SERPL: 14.5 (ref 7–25)
CALCIUM SPEC-SCNC: 8.7 MG/DL (ref 8.6–10.5)
CHLORIDE SERPL-SCNC: 96 MMOL/L (ref 98–107)
CHOLEST SERPL-MCNC: 114 MG/DL (ref 0–200)
CK SERPL-CCNC: 127 U/L (ref 20–180)
CO2 SERPL-SCNC: 27 MMOL/L (ref 22–29)
CREAT SERPL-MCNC: 0.69 MG/DL (ref 0.57–1)
D-LACTATE SERPL-SCNC: 1.4 MMOL/L (ref 0.5–2)
DEPRECATED RDW RBC AUTO: 44.7 FL (ref 37–54)
EOSINOPHIL # BLD AUTO: 0.01 10*3/MM3 (ref 0–0.4)
EOSINOPHIL NFR BLD AUTO: 0.1 % (ref 0.3–6.2)
ERYTHROCYTE [DISTWIDTH] IN BLOOD BY AUTOMATED COUNT: 13.6 % (ref 12.3–15.4)
FLUAV RNA RESP QL NAA+PROBE: NOT DETECTED
FLUBV RNA RESP QL NAA+PROBE: NOT DETECTED
GFR SERPL CREATININE-BSD FRML MDRD: 93 ML/MIN/1.73
GLOBULIN UR ELPH-MCNC: 3.3 GM/DL
GLUCOSE BLDC GLUCOMTR-MCNC: 194 MG/DL (ref 70–130)
GLUCOSE BLDC GLUCOMTR-MCNC: 248 MG/DL (ref 70–130)
GLUCOSE SERPL-MCNC: 194 MG/DL (ref 65–99)
HCO3 BLDA-SCNC: 25 MMOL/L (ref 20–26)
HCT VFR BLD AUTO: 35.5 % (ref 34–46.6)
HDLC SERPL-MCNC: 70 MG/DL (ref 40–60)
HGB BLD-MCNC: 12.4 G/DL (ref 12–15.9)
HOLD SPECIMEN: NORMAL
IMM GRANULOCYTES # BLD AUTO: 0.02 10*3/MM3 (ref 0–0.05)
IMM GRANULOCYTES NFR BLD AUTO: 0.3 % (ref 0–0.5)
LDLC SERPL CALC-MCNC: 31 MG/DL (ref 0–100)
LDLC/HDLC SERPL: 0.45 {RATIO}
LIPASE SERPL-CCNC: 19 U/L (ref 13–60)
LYMPHOCYTES # BLD AUTO: 1.81 10*3/MM3 (ref 0.7–3.1)
LYMPHOCYTES NFR BLD AUTO: 23.3 % (ref 19.6–45.3)
Lab: ABNORMAL
MAGNESIUM SERPL-MCNC: 1.7 MG/DL (ref 1.6–2.6)
MCH RBC QN AUTO: 31.1 PG (ref 26.6–33)
MCHC RBC AUTO-ENTMCNC: 34.9 G/DL (ref 31.5–35.7)
MCV RBC AUTO: 89 FL (ref 79–97)
MODALITY: ABNORMAL
MONOCYTES # BLD AUTO: 0.8 10*3/MM3 (ref 0.1–0.9)
MONOCYTES NFR BLD AUTO: 10.3 % (ref 5–12)
NEUTROPHILS NFR BLD AUTO: 5.1 10*3/MM3 (ref 1.7–7)
NEUTROPHILS NFR BLD AUTO: 65.7 % (ref 42.7–76)
NRBC BLD AUTO-RTO: 0 /100 WBC (ref 0–0.2)
PCO2 BLDA: 35.4 MM HG (ref 35–45)
PH BLDA: 7.46 PH UNITS (ref 7.35–7.45)
PHOSPHATE SERPL-MCNC: 2.8 MG/DL (ref 2.5–4.5)
PLATELET # BLD AUTO: 243 10*3/MM3 (ref 140–450)
PMV BLD AUTO: 9.8 FL (ref 6–12)
PO2 BLDA: 85.2 MM HG (ref 83–108)
POTASSIUM SERPL-SCNC: 3.3 MMOL/L (ref 3.5–5.2)
POTASSIUM SERPL-SCNC: 4.1 MMOL/L (ref 3.5–5.2)
PROT SERPL-MCNC: 7.1 G/DL (ref 6–8.5)
QT INTERVAL: 396 MS
QTC INTERVAL: 476 MS
RBC # BLD AUTO: 3.99 10*6/MM3 (ref 3.77–5.28)
SAO2 % BLDCOA: 97.1 % (ref 94–99)
SARS-COV-2 RNA RESP QL NAA+PROBE: NOT DETECTED
SODIUM SERPL-SCNC: 133 MMOL/L (ref 136–145)
TRIGL SERPL-MCNC: 61 MG/DL (ref 0–150)
VENTILATOR MODE: ABNORMAL
VLDLC SERPL-MCNC: 13 MG/DL (ref 5–40)
WBC # BLD AUTO: 7.76 10*3/MM3 (ref 3.4–10.8)

## 2021-01-04 PROCEDURE — 83690 ASSAY OF LIPASE: CPT | Performed by: HOSPITALIST

## 2021-01-04 PROCEDURE — 25010000002 THIAMINE PER 100 MG: Performed by: HOSPITALIST

## 2021-01-04 PROCEDURE — 83605 ASSAY OF LACTIC ACID: CPT | Performed by: HOSPITALIST

## 2021-01-04 PROCEDURE — 82962 GLUCOSE BLOOD TEST: CPT

## 2021-01-04 PROCEDURE — 63710000001 INSULIN ASPART PER 5 UNITS: Performed by: HOSPITALIST

## 2021-01-04 PROCEDURE — 80053 COMPREHEN METABOLIC PANEL: CPT | Performed by: HOSPITALIST

## 2021-01-04 PROCEDURE — 85025 COMPLETE CBC W/AUTO DIFF WBC: CPT | Performed by: HOSPITALIST

## 2021-01-04 PROCEDURE — 25010000002 HYDRALAZINE PER 20 MG: Performed by: HOSPITALIST

## 2021-01-04 PROCEDURE — 83735 ASSAY OF MAGNESIUM: CPT | Performed by: HOSPITALIST

## 2021-01-04 PROCEDURE — 36600 WITHDRAWAL OF ARTERIAL BLOOD: CPT

## 2021-01-04 PROCEDURE — 63710000001 INSULIN DETEMIR PER 5 UNITS: Performed by: INTERNAL MEDICINE

## 2021-01-04 PROCEDURE — 25010000002 HYDRALAZINE PER 20 MG: Performed by: INTERNAL MEDICINE

## 2021-01-04 PROCEDURE — 25010000002 THIAMINE PER 100 MG: Performed by: INTERNAL MEDICINE

## 2021-01-04 PROCEDURE — 25010000002 LORAZEPAM PER 2 MG: Performed by: INTERNAL MEDICINE

## 2021-01-04 PROCEDURE — 63710000001 INSULIN ASPART PER 5 UNITS: Performed by: INTERNAL MEDICINE

## 2021-01-04 PROCEDURE — 84132 ASSAY OF SERUM POTASSIUM: CPT | Performed by: HOSPITALIST

## 2021-01-04 PROCEDURE — 80061 LIPID PANEL: CPT | Performed by: HOSPITALIST

## 2021-01-04 PROCEDURE — 25010000002 ONDANSETRON PER 1 MG: Performed by: HOSPITALIST

## 2021-01-04 PROCEDURE — 82150 ASSAY OF AMYLASE: CPT | Performed by: HOSPITALIST

## 2021-01-04 PROCEDURE — 25010000002 ONDANSETRON PER 1 MG: Performed by: INTERNAL MEDICINE

## 2021-01-04 PROCEDURE — 82803 BLOOD GASES ANY COMBINATION: CPT

## 2021-01-04 PROCEDURE — G0378 HOSPITAL OBSERVATION PER HR: HCPCS

## 2021-01-04 PROCEDURE — 84100 ASSAY OF PHOSPHORUS: CPT | Performed by: HOSPITALIST

## 2021-01-04 PROCEDURE — 25010000002 LORAZEPAM PER 2 MG: Performed by: HOSPITALIST

## 2021-01-04 PROCEDURE — 82550 ASSAY OF CK (CPK): CPT | Performed by: HOSPITALIST

## 2021-01-04 RX ORDER — LORAZEPAM 2 MG/ML
4 INJECTION INTRAMUSCULAR
Status: DISCONTINUED | OUTPATIENT
Start: 2021-01-04 | End: 2021-01-06 | Stop reason: HOSPADM

## 2021-01-04 RX ORDER — LORAZEPAM 2 MG/ML
1 INJECTION INTRAMUSCULAR EVERY 6 HOURS
Status: COMPLETED | OUTPATIENT
Start: 2021-01-04 | End: 2021-01-04

## 2021-01-04 RX ORDER — DEXTROSE MONOHYDRATE 25 G/50ML
25 INJECTION, SOLUTION INTRAVENOUS
Status: DISCONTINUED | OUTPATIENT
Start: 2021-01-04 | End: 2021-01-04 | Stop reason: SDUPTHER

## 2021-01-04 RX ORDER — ALBUTEROL SULFATE 90 UG/1
2 AEROSOL, METERED RESPIRATORY (INHALATION) EVERY 6 HOURS PRN
Status: DISCONTINUED | OUTPATIENT
Start: 2021-01-04 | End: 2021-01-06 | Stop reason: HOSPADM

## 2021-01-04 RX ORDER — TRAZODONE HYDROCHLORIDE 50 MG/1
50 TABLET ORAL NIGHTLY PRN
Status: DISCONTINUED | OUTPATIENT
Start: 2021-01-04 | End: 2021-01-06 | Stop reason: HOSPADM

## 2021-01-04 RX ORDER — PANTOPRAZOLE SODIUM 40 MG/10ML
40 INJECTION, POWDER, LYOPHILIZED, FOR SOLUTION INTRAVENOUS
Status: DISCONTINUED | OUTPATIENT
Start: 2021-01-04 | End: 2021-01-04

## 2021-01-04 RX ORDER — LORAZEPAM 2 MG/ML
1 INJECTION INTRAMUSCULAR
Status: DISCONTINUED | OUTPATIENT
Start: 2021-01-04 | End: 2021-01-06 | Stop reason: HOSPADM

## 2021-01-04 RX ORDER — ONDANSETRON 4 MG/1
4 TABLET, FILM COATED ORAL EVERY 6 HOURS PRN
Status: DISCONTINUED | OUTPATIENT
Start: 2021-01-04 | End: 2021-01-06 | Stop reason: HOSPADM

## 2021-01-04 RX ORDER — LORAZEPAM 2 MG/ML
2 INJECTION INTRAMUSCULAR
Status: DISCONTINUED | OUTPATIENT
Start: 2021-01-04 | End: 2021-01-06 | Stop reason: HOSPADM

## 2021-01-04 RX ORDER — LORAZEPAM 2 MG/1
4 TABLET ORAL
Status: DISCONTINUED | OUTPATIENT
Start: 2021-01-04 | End: 2021-01-06 | Stop reason: HOSPADM

## 2021-01-04 RX ORDER — LORAZEPAM 2 MG/ML
1 INJECTION INTRAMUSCULAR EVERY 8 HOURS
Status: COMPLETED | OUTPATIENT
Start: 2021-01-05 | End: 2021-01-05

## 2021-01-04 RX ORDER — DEXTROSE MONOHYDRATE 25 G/50ML
25 INJECTION, SOLUTION INTRAVENOUS
Status: DISCONTINUED | OUTPATIENT
Start: 2021-01-04 | End: 2021-01-06 | Stop reason: HOSPADM

## 2021-01-04 RX ORDER — ONDANSETRON 2 MG/ML
4 INJECTION INTRAMUSCULAR; INTRAVENOUS EVERY 6 HOURS PRN
Status: DISCONTINUED | OUTPATIENT
Start: 2021-01-04 | End: 2021-01-06 | Stop reason: HOSPADM

## 2021-01-04 RX ORDER — LORAZEPAM 2 MG/1
2 TABLET ORAL
Status: DISCONTINUED | OUTPATIENT
Start: 2021-01-04 | End: 2021-01-06 | Stop reason: HOSPADM

## 2021-01-04 RX ORDER — HYDRALAZINE HYDROCHLORIDE 20 MG/ML
10 INJECTION INTRAMUSCULAR; INTRAVENOUS EVERY 4 HOURS PRN
Status: DISCONTINUED | OUTPATIENT
Start: 2021-01-04 | End: 2021-01-06 | Stop reason: HOSPADM

## 2021-01-04 RX ORDER — POTASSIUM CHLORIDE 7.45 MG/ML
10 INJECTION INTRAVENOUS
Status: DISCONTINUED | OUTPATIENT
Start: 2021-01-04 | End: 2021-01-06 | Stop reason: HOSPADM

## 2021-01-04 RX ORDER — LOSARTAN POTASSIUM 50 MG/1
50 TABLET ORAL
Status: DISCONTINUED | OUTPATIENT
Start: 2021-01-04 | End: 2021-01-05

## 2021-01-04 RX ORDER — POTASSIUM CHLORIDE 750 MG/1
40 CAPSULE, EXTENDED RELEASE ORAL AS NEEDED
Status: DISCONTINUED | OUTPATIENT
Start: 2021-01-04 | End: 2021-01-06 | Stop reason: HOSPADM

## 2021-01-04 RX ORDER — ESCITALOPRAM OXALATE 10 MG/1
20 TABLET ORAL DAILY
Status: DISCONTINUED | OUTPATIENT
Start: 2021-01-04 | End: 2021-01-06 | Stop reason: HOSPADM

## 2021-01-04 RX ORDER — NICOTINE POLACRILEX 4 MG
15 LOZENGE BUCCAL
Status: DISCONTINUED | OUTPATIENT
Start: 2021-01-04 | End: 2021-01-06 | Stop reason: HOSPADM

## 2021-01-04 RX ORDER — SODIUM CHLORIDE 0.9 % (FLUSH) 0.9 %
10 SYRINGE (ML) INJECTION EVERY 12 HOURS SCHEDULED
Status: DISCONTINUED | OUTPATIENT
Start: 2021-01-04 | End: 2021-01-06 | Stop reason: HOSPADM

## 2021-01-04 RX ORDER — POTASSIUM CHLORIDE 1.5 G/1.77G
40 POWDER, FOR SOLUTION ORAL AS NEEDED
Status: DISCONTINUED | OUTPATIENT
Start: 2021-01-04 | End: 2021-01-06 | Stop reason: HOSPADM

## 2021-01-04 RX ORDER — SODIUM CHLORIDE 0.9 % (FLUSH) 0.9 %
10 SYRINGE (ML) INJECTION AS NEEDED
Status: DISCONTINUED | OUTPATIENT
Start: 2021-01-04 | End: 2021-01-06 | Stop reason: HOSPADM

## 2021-01-04 RX ORDER — BISACODYL 5 MG/1
5 TABLET, DELAYED RELEASE ORAL DAILY PRN
Status: DISCONTINUED | OUTPATIENT
Start: 2021-01-04 | End: 2021-01-06 | Stop reason: HOSPADM

## 2021-01-04 RX ORDER — NICOTINE POLACRILEX 4 MG
15 LOZENGE BUCCAL
Status: DISCONTINUED | OUTPATIENT
Start: 2021-01-04 | End: 2021-01-04 | Stop reason: SDUPTHER

## 2021-01-04 RX ORDER — LORAZEPAM 0.5 MG/1
1 TABLET ORAL
Status: DISCONTINUED | OUTPATIENT
Start: 2021-01-04 | End: 2021-01-06 | Stop reason: HOSPADM

## 2021-01-04 RX ORDER — PANTOPRAZOLE SODIUM 40 MG/1
40 TABLET, DELAYED RELEASE ORAL EVERY MORNING
Status: DISCONTINUED | OUTPATIENT
Start: 2021-01-04 | End: 2021-01-06 | Stop reason: HOSPADM

## 2021-01-04 RX ORDER — SODIUM CHLORIDE 9 MG/ML
100 INJECTION, SOLUTION INTRAVENOUS CONTINUOUS
Status: DISCONTINUED | OUTPATIENT
Start: 2021-01-04 | End: 2021-01-05

## 2021-01-04 RX ADMIN — INSULIN ASPART 2 UNITS: 100 INJECTION, SOLUTION INTRAVENOUS; SUBCUTANEOUS at 16:38

## 2021-01-04 RX ADMIN — SODIUM CHLORIDE, PRESERVATIVE FREE 10 ML: 5 INJECTION INTRAVENOUS at 20:14

## 2021-01-04 RX ADMIN — LORAZEPAM 1 MG: 2 INJECTION, SOLUTION INTRAMUSCULAR; INTRAVENOUS at 09:23

## 2021-01-04 RX ADMIN — METOPROLOL TARTRATE 5 MG: 5 INJECTION INTRAVENOUS at 16:47

## 2021-01-04 RX ADMIN — SODIUM CHLORIDE, PRESERVATIVE FREE 10 ML: 5 INJECTION INTRAVENOUS at 02:45

## 2021-01-04 RX ADMIN — POTASSIUM CHLORIDE 40 MEQ: 10 CAPSULE, COATED, EXTENDED RELEASE ORAL at 09:34

## 2021-01-04 RX ADMIN — POTASSIUM CHLORIDE, DEXTROSE MONOHYDRATE AND SODIUM CHLORIDE 125 ML/HR: 150; 5; 900 INJECTION, SOLUTION INTRAVENOUS at 07:50

## 2021-01-04 RX ADMIN — LORAZEPAM 1 MG: 2 INJECTION, SOLUTION INTRAMUSCULAR; INTRAVENOUS at 20:03

## 2021-01-04 RX ADMIN — LOSARTAN POTASSIUM 50 MG: 50 TABLET, FILM COATED ORAL at 12:40

## 2021-01-04 RX ADMIN — SODIUM CHLORIDE, PRESERVATIVE FREE 10 ML: 5 INJECTION INTRAVENOUS at 09:18

## 2021-01-04 RX ADMIN — PANTOPRAZOLE SODIUM 40 MG: 40 INJECTION, POWDER, FOR SOLUTION INTRAVENOUS at 05:17

## 2021-01-04 RX ADMIN — INSULIN DETEMIR 10 UNITS: 100 INJECTION, SOLUTION SUBCUTANEOUS at 20:01

## 2021-01-04 RX ADMIN — PANTOPRAZOLE SODIUM 40 MG: 40 TABLET, DELAYED RELEASE ORAL at 09:35

## 2021-01-04 RX ADMIN — INSULIN ASPART 3 UNITS: 100 INJECTION, SOLUTION INTRAVENOUS; SUBCUTANEOUS at 12:41

## 2021-01-04 RX ADMIN — FOLIC ACID 100 ML/HR: 5 INJECTION, SOLUTION INTRAMUSCULAR; INTRAVENOUS; SUBCUTANEOUS at 09:18

## 2021-01-04 RX ADMIN — LORAZEPAM 1 MG: 2 INJECTION, SOLUTION INTRAMUSCULAR; INTRAVENOUS at 02:32

## 2021-01-04 RX ADMIN — SODIUM CHLORIDE 100 ML/HR: 9 INJECTION, SOLUTION INTRAVENOUS at 12:41

## 2021-01-04 RX ADMIN — ONDANSETRON 4 MG: 2 INJECTION INTRAMUSCULAR; INTRAVENOUS at 20:00

## 2021-01-04 RX ADMIN — ESCITALOPRAM OXALATE 20 MG: 10 TABLET ORAL at 09:35

## 2021-01-04 RX ADMIN — INSULIN ASPART 2 UNITS: 100 INJECTION, SOLUTION INTRAVENOUS; SUBCUTANEOUS at 09:18

## 2021-01-04 RX ADMIN — HYDRALAZINE HYDROCHLORIDE 10 MG: 20 INJECTION INTRAMUSCULAR; INTRAVENOUS at 20:00

## 2021-01-04 RX ADMIN — LORAZEPAM 2 MG: 2 INJECTION, SOLUTION INTRAMUSCULAR; INTRAVENOUS at 05:17

## 2021-01-04 RX ADMIN — POTASSIUM CHLORIDE 40 MEQ: 10 CAPSULE, COATED, EXTENDED RELEASE ORAL at 05:18

## 2021-01-04 RX ADMIN — HYDRALAZINE HYDROCHLORIDE 10 MG: 20 INJECTION INTRAMUSCULAR; INTRAVENOUS at 12:57

## 2021-01-04 RX ADMIN — LORAZEPAM 1 MG: 2 INJECTION, SOLUTION INTRAMUSCULAR; INTRAVENOUS at 14:49

## 2021-01-04 NOTE — PLAN OF CARE
Goal Outcome Evaluation:  Plan of Care Reviewed With: patient  Progress: no change  Outcome Summary: Pt resting comfortably at this time. Blood pressure elevated this shift with no response to PRN hydralazine 10mg. Lopressor 5mg IV push given, awaiting results. Pt able to ambulate to bedside commode this shift. No c/o pain or anxiety noted. Tremors noted in pt hands/fingertips. Heart rate et o2 WNL. Will continue to monitor.
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
## 2021-01-04 NOTE — PROGRESS NOTES
PSYCH ED NOTE     Notified of pt in ED.  Have reviewed tech, nursing & provider documentation, as available at time of note.      Briefly, 39 y/o F with EtOH use d/o.  Pt presents with c/o nausea w/ detox sx.  Wanting rehab resources.  Denies SI/HI.       O -  Denies SI/HI.  UDS negative.      A&P)  --EtOH Use D/o, unspecified severity     --> Does not meet admission criteria given strict substance rehab concerns.  She is wanting outpt resources.  [facility] staff to provide.     --> At time of review, patient adamantly denies any thoughts of death or dying.  The patient also adamantly denies any suicidal ideations, intentions or plans.  Denies any homicidal ideations, intentions or plans.  Denies any auditory visual hallucinations.  Denies paranoia.  Not grossly psychotic.  The patient does not constitute an imminent risk of harm to self or others, at time of the interview.  Therefore, patient does not meet commitment criteria at this time.     --> Discussed with U Staff, ED staff, ED Physician, Dr. Chandra.      --> If any question or changes, kindly contact me.        Broderick Wilder II, MD  01/03/21 @ 22:13 CST

## 2021-01-04 NOTE — ED PROVIDER NOTES
Subjective   40-year-old female in the emergency department complaining of anxiety, alcohol withdrawal nausea vomiting.  Patient reports he normally drinks 1/5 of whiskey a day.  She has not had anything to drink in the past 24 hours.  Patient reports numerous bouts of nausea vomiting, she also tells me she is having some type of panic attack with palpitations.  Denies any suicidal homicidal ideations.      History provided by:  Patient   used: No        Review of Systems   Constitutional: Positive for activity change, appetite change, chills and fatigue.   HENT: Negative for congestion.    Respiratory: Negative for shortness of breath.    Cardiovascular: Negative for chest pain.   Gastrointestinal: Positive for abdominal pain, nausea and vomiting.   Genitourinary: Negative for flank pain.   Musculoskeletal: Negative for arthralgias.   Skin: Negative for wound.   Allergic/Immunologic: Negative for immunocompromised state.   Neurological: Positive for weakness.   Hematological: Negative for adenopathy.   All other systems reviewed and are negative.      Past Medical History:   Diagnosis Date   • Acute bronchitis    • Acute conjunctivitis     left   • Acute pharyngitis    • Alcohol abuse    • Allergic rhinitis due to pollen    • Anxiety state    • Asthma    • Constipation    • Depressive disorder    • Diarrhea    • GERD (gastroesophageal reflux disease)    • Hypertensive disorder    • Insomnia    • Knee pain    • Migraine    • Nausea    • Nausea and vomiting    • Psychiatric illness    • Rib pain    • Tobacco dependence syndrome    • Type 2 diabetes mellitus (CMS/HCC)     uncontrolled   • URI (upper respiratory infection)    • Viral intestinal infection, unspecified    • Wheezing        Allergies   Allergen Reactions   • Lisinopril Cough     Is currently prescribed lisinopril for her HTN, but isn't taking it due to cough; hasn't been back to see the MD   • Penicillins Unknown (See Comments)     Pt  "was [age] and unsure of reaction       Past Surgical History:   Procedure Laterality Date   • ABDOMINAL SURGERY     •  SECTION     • INJECTION OF MEDICATION  2016    Kenalog   • INJECTION OF MEDICATION  2015    Toradol   • PAP SMEAR  2010   • TUBAL ABDOMINAL LIGATION         Family History   Problem Relation Age of Onset   • Drug abuse Mother         addicted to pain pills,  from overdose   • Suicide Attempts Mother    • Heart attack Father    • Alcohol abuse Father    • Hepatitis Sister         IV drug use   • Drug abuse Sister    • Bipolar disorder Sister    • Colon cancer Maternal Grandmother          at age 19   • Heart disease Maternal Grandfather    • Tuberculosis Paternal Grandfather    • ADD / ADHD Son        Social History     Socioeconomic History   • Marital status: Single     Spouse name: Not on file   • Number of children: 2   • Years of education: 12   • Highest education level: Not on file   Occupational History   • Occupation: unemployed   Tobacco Use   • Smoking status: Current Every Day Smoker     Packs/day: 1.00     Years: 8.00     Pack years: 8.00     Types: Cigarettes   • Smokeless tobacco: Never Used   Substance and Sexual Activity   • Alcohol use: Yes     Alcohol/week: 6.0 standard drinks     Types: 6 Cans of beer per week     Comment: \"a fifth a day\"   • Drug use: No     Types: Marijuana     Comment: when was younger   • Sexual activity: Never     Birth control/protection: Surgical   Social History Narrative    Substance Abuse: Alcohol: daily heavy use until about 3-4 months ago then relapsed yesterday with level of 264,  Cannabis: use in teens, Methamphetamine: tried it once at age 23, Opiate: does not use, Cocaine: does not use, Synthetic: does not use and IV drug use: denies        Marriages: 0    Current Relationships: Single    Children: 2 (18 and 21); good relationship with 21yr old; both by same father        Education: a couple years of college     " Occupation: individual, not currently working    Living Situation: was living with son           Objective   Physical Exam  Vitals signs and nursing note reviewed.   Constitutional:       General: She is in acute distress.      Appearance: She is well-developed. She is ill-appearing.   HENT:      Head: Normocephalic.      Nose: Nose normal.   Eyes:      Conjunctiva/sclera: Conjunctivae normal.      Pupils: Pupils are equal, round, and reactive to light.   Neck:      Musculoskeletal: Normal range of motion.   Cardiovascular:      Rate and Rhythm: Regular rhythm. Tachycardia present.      Heart sounds: Normal heart sounds.   Pulmonary:      Effort: Pulmonary effort is normal.      Breath sounds: Normal breath sounds.   Abdominal:      Palpations: Abdomen is soft.   Musculoskeletal: Normal range of motion.   Skin:     General: Skin is warm and dry.   Neurological:      Mental Status: She is alert and oriented to person, place, and time.      GCS: GCS eye subscore is 4. GCS verbal subscore is 5. GCS motor subscore is 6.   Psychiatric:         Attention and Perception: She is attentive.         Mood and Affect: Mood is anxious.         Speech: Speech is rapid and pressured.         Behavior: Behavior is hyperactive.         Thought Content: Thought content does not include homicidal or suicidal ideation.         Cognition and Memory: Cognition normal.         ECG 12 Lead      Date/Time: 1/3/2021 8:29 PM  Performed by: Arnaldo Castillo APRN  Authorized by: Malcolm Chandra MD   Rhythm: sinus rhythm  Rate: normal  QRS axis: normal  Clinical impression: normal ECG                 ED Course  ED Course as of Jan 03 2309   Sun Jan 03, 2021 2109 Signed out to  pending [facility] consult     [JL]   2111 IV fluids, ativan given. Pt feels better. Clinically improved.     [JL]      ED Course User Index  [JL] Arnaldo Castillo APRN      No orders to display     Results for orders placed or performed during the hospital encounter  of 01/03/21   Comprehensive Metabolic Panel    Specimen: Arm, Left; Blood   Result Value Ref Range    Glucose 232 (H) 65 - 99 mg/dL    BUN 15 6 - 20 mg/dL    Creatinine 0.91 0.57 - 1.00 mg/dL    Sodium 135 (L) 136 - 145 mmol/L    Potassium 3.4 (L) 3.5 - 5.2 mmol/L    Chloride 92 (L) 98 - 107 mmol/L    CO2 22.0 22.0 - 29.0 mmol/L    Calcium 9.6 8.6 - 10.5 mg/dL    Total Protein 8.6 (H) 6.0 - 8.5 g/dL    Albumin 4.70 3.50 - 5.20 g/dL    ALT (SGPT) 27 1 - 33 U/L    AST (SGOT) 27 1 - 32 U/L    Alkaline Phosphatase 91 39 - 117 U/L    Total Bilirubin 1.0 0.0 - 1.2 mg/dL    eGFR Non African Amer 68 >60 mL/min/1.73    Globulin 3.9 gm/dL    A/G Ratio 1.2 g/dL    BUN/Creatinine Ratio 16.5 7.0 - 25.0    Anion Gap 21.0 (H) 5.0 - 15.0 mmol/L   Lipase    Specimen: Arm, Left; Blood   Result Value Ref Range    Lipase 23 13 - 60 U/L   Urinalysis With Microscopic If Indicated (No Culture) - Urine, Clean Catch    Specimen: Urine, Clean Catch   Result Value Ref Range    Color, UA Yellow Yellow, Straw, Dark Yellow, Nyasia    Appearance, UA Clear Clear    pH, UA 6.0 5.0 - 9.0    Specific Gravity, UA 1.034 (H) 1.003 - 1.030    Glucose,  mg/dL (1+) (A) Negative    Ketones, UA 40 mg/dL (2+) (A) Negative    Bilirubin, UA Small (1+) (A) Negative    Blood, UA Trace (A) Negative    Protein, UA >=300 mg/dL (3+) (A) Negative    Leuk Esterase, UA Negative Negative    Nitrite, UA Negative Negative    Urobilinogen, UA 0.2 E.U./dL 0.2 - 1.0 E.U./dL   Urine Drug Screen - Urine, Clean Catch    Specimen: Urine, Clean Catch   Result Value Ref Range    THC, Screen, Urine Negative Negative    Phencyclidine (PCP), Urine Negative Negative    Cocaine Screen, Urine Negative Negative    Methamphetamine, Ur Negative Negative    Opiate Screen Negative Negative    Amphetamine Screen, Urine Negative Negative    Benzodiazepine Screen, Urine Negative Negative    Tricyclic Antidepressants Screen Negative Negative    Methadone Screen, Urine Negative Negative     Barbiturates Screen, Urine Negative Negative    Oxycodone Screen, Urine Negative Negative    Propoxyphene Screen Negative Negative    Buprenorphine, Screen, Urine Negative Negative   Ethanol    Specimen: Arm, Left; Blood   Result Value Ref Range    Ethanol <10 0 - 10 mg/dL    Ethanol % <0.010 %   Magnesium    Specimen: Arm, Left; Blood   Result Value Ref Range    Magnesium 1.1 (L) 1.6 - 2.6 mg/dL   TSH    Specimen: Arm, Left; Blood   Result Value Ref Range    TSH 2.720 0.270 - 4.200 uIU/mL   CBC Auto Differential    Specimen: Arm, Left; Blood   Result Value Ref Range    WBC 12.14 (H) 3.40 - 10.80 10*3/mm3    RBC 4.67 3.77 - 5.28 10*6/mm3    Hemoglobin 14.6 12.0 - 15.9 g/dL    Hematocrit 41.5 34.0 - 46.6 %    MCV 88.9 79.0 - 97.0 fL    MCH 31.3 26.6 - 33.0 pg    MCHC 35.2 31.5 - 35.7 g/dL    RDW 13.5 12.3 - 15.4 %    RDW-SD 44.3 37.0 - 54.0 fl    MPV 9.6 6.0 - 12.0 fL    Platelets 358 140 - 450 10*3/mm3    Neutrophil % 77.8 (H) 42.7 - 76.0 %    Lymphocyte % 14.7 (L) 19.6 - 45.3 %    Monocyte % 6.8 5.0 - 12.0 %    Eosinophil % 0.0 (L) 0.3 - 6.2 %    Basophil % 0.3 0.0 - 1.5 %    Immature Grans % 0.4 0.0 - 0.5 %    Neutrophils, Absolute 9.44 (H) 1.70 - 7.00 10*3/mm3    Lymphocytes, Absolute 1.78 0.70 - 3.10 10*3/mm3    Monocytes, Absolute 0.83 0.10 - 0.90 10*3/mm3    Eosinophils, Absolute 0.00 0.00 - 0.40 10*3/mm3    Basophils, Absolute 0.04 0.00 - 0.20 10*3/mm3    Immature Grans, Absolute 0.05 0.00 - 0.05 10*3/mm3    nRBC 0.0 0.0 - 0.2 /100 WBC   Urinalysis, Microscopic Only - Urine, Clean Catch    Specimen: Urine, Clean Catch   Result Value Ref Range    RBC, UA None Seen None Seen /HPF    WBC, UA 0-2 None Seen, 0-2, 3-5 /HPF    Bacteria, UA 2+ (A) None Seen /HPF    Squamous Epithelial Cells, UA 6-12 (A) None Seen, 0-2 /HPF    Hyaline Casts, UA 0-2 None Seen /LPF    Methodology Automated Microscopy    Light Blue Top   Result Value Ref Range    Extra Tube hold for add-on    Gold Top - SST   Result Value Ref Range     Extra Tube Hold for add-ons.    [place] Blood Culture Bottle Set    Specimen: Arm, Left; Blood   Result Value Ref Range    Extra Tube Hold for add-ons.                                           MDM    Final diagnoses:   Alcohol withdrawal syndrome with complication (CMS/HCC)   Panic attack   Alcoholic ketoacidosis   Hypomagnesemia   Hypertensive urgency            Malcolm Chandra MD  01/03/21 3146

## 2021-01-04 NOTE — H&P
.        [facility] Medicine Admission      Date of Admission: 1/3/2021  Patient seen and evaluated on 1/3/2021 however note is written after midnight    Primary Care Physician: Provider, No Known      Chief Complaint: *Notable nausea vomiting anxiety    HPI:*  Patient is a 40-year-old female who presents to the [facility] emergency room nausea vomiting and anxiety.  She has a past medical history of diabetes, alcohol abuse anxiety asthma hypertension.  He normally drinks 1/5 of whiskey a day and stopped drinking 24 hours prior to admission.  She reports having several episodes of nausea and vomiting and she felt like she was having a panic attack with palpitations.  She denies any suicidal or homicidal ideations.  She states that she has been having anxiety.  She denies any sick contacts fever chills diarrhea.  She was worked up in the emergency room found to have a magnesium of 1.1 blood pressure in the 200s systolic ketones in the urine and anion gap of 21  Concurrent Medical History:  has a past medical history of Acute bronchitis, Acute conjunctivitis, Acute pharyngitis, Alcohol abuse, Allergic rhinitis due to pollen, Anxiety state, Asthma, Constipation, Depressive disorder, Diarrhea, GERD (gastroesophageal reflux disease), Hypertensive disorder, Insomnia, Knee pain, Migraine, Nausea, Nausea and vomiting, Psychiatric illness, Rib pain, Tobacco dependence syndrome, Type 2 diabetes mellitus (CMS/HCC), URI (upper respiratory infection), Viral intestinal infection, unspecified, and Wheezing.    Past Surgical History:  has a past surgical history that includes Injection of Medication (2016); Pap Smear (2010); Injection of Medication (2015); Tubal ligation ();  section; and Abdominal surgery.    Family History: family history includes ADD / ADHD in her son; Alcohol abuse in her father; Bipolar disorder in her sister; Colon cancer in  her maternal grandmother; Drug abuse in her mother and sister; Heart attack in her father; Heart disease in her maternal grandfather; Hepatitis in her sister; Suicide Attempts in her mother; Tuberculosis in her paternal grandfather. *    Social History:  reports that she has been smoking cigarettes. She has a 8.00 pack-year smoking history. She has never used smokeless tobacco. She reports current alcohol use of about 6.0 standard drinks of alcohol per week. She reports that she does not use drugs.    Allergies:   Allergies   Allergen Reactions   • Lisinopril Cough     Is currently prescribed lisinopril for her HTN, but isn't taking it due to cough; hasn't been back to see the MD   • Penicillins Unknown (See Comments)     Pt was five and unsure of reaction       Medications:   Prior to Admission medications    Medication Sig Start Date End Date Taking? Authorizing Provider   albuterol (PROVENTIL HFA;VENTOLIN HFA) 108 (90 BASE) MCG/ACT inhaler Inhale 2 puffs 4 (Four) Times a Day.    Denisse Gage MD   Blood Glucose Monitoring Suppl (ONE TOUCH ULTRA 2) W/DEVICE kit  12/9/16   Denisse Gage MD   escitalopram (LEXAPRO) 20 MG tablet Take 1 tablet by mouth Daily. 11/30/18   Deisy Kahn MD   insulin aspart (novoLOG) 100 UNIT/ML injection Inject 0-9 Units under the skin 4 (Four) Times a Day Before Meals & at Bedtime. 2/28/18   Blue Leonard PA   insulin detemir (LEVEMIR) 100 UNIT/ML injection Inject 10 Units under the skin Every Night. 2/28/18   Blue Leonard PA   Lancets 28G misc 1 each. 12/9/16   Denisse Gage MD   metFORMIN (GLUCOPHAGE) 1000 MG tablet Take 1,000 mg by mouth 2 (Two) Times a Day. 12/14/16   Denisse Gage MD   naltrexone (DEPADE) 50 MG tablet Take 1 tablet by mouth Daily. 12/1/18   Deisy Kahn MD   nicotine (NICODERM CQ) 21 MG/24HR patch Place 1 patch on the skin Daily. 2/18/17   Christine Gordon APRN   omeprazole (priLOSEC) 40 MG capsule TAKE  ONE CAPSULE BY MOUTH DAILY 11/10/16   Bradley Bliss MD   traZODone (DESYREL) 50 MG tablet Take 1 tablet by mouth At Night As Needed for Sleep (insomnia). 11/30/18   Deisy Kahn MD       Review of Systems:  Review of Systems   12 point review of systems obtained pertinent positives and negatives noted above in the HPI otherwise complete ROS is negative except as mentioned above.    Physical Exam:   Temp:  [97.1 °F (36.2 °C)-97.9 °F (36.6 °C)] 97.1 °F (36.2 °C)  Heart Rate:  [] 84  Resp:  [18-22] 18  BP: (158-214)/() 158/79  Physical Exam  Vitals signs and nursing note reviewed.   Constitutional:       General: She is not in acute distress.     Appearance: She is normal weight. She is ill-appearing. She is not toxic-appearing or diaphoretic.   HENT:      Head: Normocephalic.      Nose: Nose normal.      Mouth/Throat:      Mouth: Mucous membranes are dry.      Pharynx: Oropharynx is clear.   Eyes:      Conjunctiva/sclera: Conjunctivae normal.      Pupils: Pupils are equal, round, and reactive to light.   Neck:      Musculoskeletal: Neck supple.   Cardiovascular:      Rate and Rhythm: Tachycardia present.      Pulses: Normal pulses.      Heart sounds: Normal heart sounds.   Pulmonary:      Effort: Pulmonary effort is normal.      Breath sounds: Normal breath sounds.   Abdominal:      General: Bowel sounds are normal.      Palpations: Abdomen is soft.   Musculoskeletal:         General: No swelling or deformity.   Skin:     General: Skin is warm.      Capillary Refill: Capillary refill takes less than 2 seconds.   Neurological:      General: No focal deficit present.      Mental Status: She is alert.      Cranial Nerves: No cranial nerve deficit.           Results Reviewed:  I have personally reviewed current lab, radiology, and data and agree with results.  Lab Results (last 24 hours)     Procedure Component Value Units Date/Time    Extra Tubes [[id number]] Collected: 01/03/21 2021    Specimen:  Blood, Venous Line Updated: 01/04/21 0030    Narrative:      The following orders were created for panel order Extra Tubes.  Procedure                               Abnormality         Status                     ---------                               -----------         ------                     Light Blue Top[[id number]]                                   Final result               Gold Top - SST[[id number]]                                   Final result               [place] Blood Culture Alex...[[id number]]                      Final result               Gray Top[[id number]]                                         Final result                 Please view results for these tests on the individual orders.    Gray Top [[id number]] Collected: 01/03/21 2021    Specimen: Blood Updated: 01/04/21 0030     Extra Tube Hold for add-ons.     Comment: Auto resulted.       Blood Gas, Arterial - [[id number]]  (Abnormal) Collected: 01/04/21 0008    Specimen: Arterial Blood Updated: 01/04/21 0013     Site Right Radial     Jadiel's Test N/A     pH, Arterial 7.458 pH units      Comment: 83 Value above reference range        pCO2, Arterial 35.4 mm Hg      pO2, Arterial 85.2 mm Hg      HCO3, Arterial 25.0 mmol/L      Base Excess, Arterial 1.4 mmol/L      O2 Saturation, Arterial 97.1 %      Barometric Pressure for Blood Gas 749 mmHg      Modality Room Air     Ventilator Mode NA     Collected by RRT     Comment: Meter: [id number]     :  [id number]       COVID-19 and FLU A/B PCR - Swab, Nasopharynx [[id number]]  (Normal) Collected: 01/03/21 2310    Specimen: Swab from Nasopharynx Updated: 01/04/21 0011     COVID19 Not Detected     Influenza A PCR Not Detected     Influenza B PCR Not Detected    Narrative:      Fact sheet for providers: https://www.fda.gov/media/958174/download    Fact sheet for patients: https://www.fda.gov/media/053457/download    Test performed by PCR.    Acetone [[id number]]  (Abnormal) Collected: 01/03/21 2022     Specimen: Blood Updated: 01/03/21 2347     Acetone Trace    hCG, Serum, Qualitative [[id number]]  (Normal) Collected: 01/03/21 2022    Specimen: Blood Updated: 01/03/21 2347     HCG Qualitative Negative    Light Blue Top [[id number]] Collected: 01/03/21 2022    Specimen: Blood Updated: 01/03/21 2130     Extra Tube hold for add-on     Comment: Auto resulted       Gold Top - SST [[id number]] Collected: 01/03/21 2022    Specimen: Blood Updated: 01/03/21 2130     Extra Tube Hold for add-ons.     Comment: Auto resulted.       [place] Blood Culture Bottle Set [[id number]] Collected: 01/03/21 2021    Specimen: Blood from Arm, Left Updated: 01/03/21 2130     Extra Tube Hold for add-ons.     Comment: Auto resulted.       TSH [[id number]]  (Normal) Collected: 01/03/21 2011    Specimen: Blood from Arm, Left Updated: 01/03/21 2048     TSH 2.720 uIU/mL     Comprehensive Metabolic Panel [[id number]]  (Abnormal) Collected: 01/03/21 2011    Specimen: Blood from Arm, Left Updated: 01/03/21 2042     Glucose 232 mg/dL      BUN 15 mg/dL      Creatinine 0.91 mg/dL      Sodium 135 mmol/L      Potassium 3.4 mmol/L      Chloride 92 mmol/L      CO2 22.0 mmol/L      Calcium 9.6 mg/dL      Total Protein 8.6 g/dL      Albumin 4.70 g/dL      ALT (SGPT) 27 U/L      AST (SGOT) 27 U/L      Alkaline Phosphatase 91 U/L      Total Bilirubin 1.0 mg/dL      eGFR Non African Amer 68 mL/min/1.73      Globulin 3.9 gm/dL      A/G Ratio 1.2 g/dL      BUN/Creatinine Ratio 16.5     Anion Gap 21.0 mmol/L     Narrative:      GFR Normal >60  Chronic Kidney Disease <60  Kidney Failure <15      Lipase [[id number]]  (Normal) Collected: 01/03/21 2011    Specimen: Blood from Arm, Left Updated: 01/03/21 2042     Lipase 23 U/L     Ethanol [[id number]] Collected: 01/03/21 2011    Specimen: Blood from Arm, Left Updated: 01/03/21 2042     Ethanol <10 mg/dL      Ethanol % <0.010 %     Magnesium [[id number]]  (Abnormal) Collected: 01/03/21 2011    Specimen: Blood from Arm, Left  Updated: 01/03/21 2042     Magnesium 1.1 mg/dL     Urinalysis, Microscopic Only - Urine, Clean Catch [[id number]]  (Abnormal) Collected: 01/03/21 2015    Specimen: Urine, Clean Catch Updated: 01/03/21 2040     RBC, UA None Seen /HPF      WBC, UA 0-2 /HPF      Bacteria, UA 2+ /HPF      Squamous Epithelial Cells, UA 6-12 /HPF      Hyaline Casts, UA 0-2 /LPF      Methodology Automated Microscopy    Urine Drug Screen - Urine, Clean Catch [[id number]]  (Normal) Collected: 01/03/21 2015    Specimen: Urine, Clean Catch Updated: 01/03/21 2038     THC, Screen, Urine Negative     Phencyclidine (PCP), Urine Negative     Cocaine Screen, Urine Negative     Methamphetamine, Ur Negative     Opiate Screen Negative     Amphetamine Screen, Urine Negative     Benzodiazepine Screen, Urine Negative     Tricyclic Antidepressants Screen Negative     Methadone Screen, Urine Negative     Barbiturates Screen, Urine Negative     Oxycodone Screen, Urine Negative     Propoxyphene Screen Negative     Buprenorphine, Screen, Urine Negative    Narrative:      Cutoff For Drugs Screened:    Amphetamines               500 ng/ml  Barbiturates               200 ng/ml  Benzodiazepines            150 ng/ml  Cocaine                    150 ng/ml  Methadone                  200 ng/ml  Opiates                    100 ng/ml  Phencyclidine               25 ng/ml  THC                            50 ng/ml  Methamphetamine            500 ng/ml  Tricyclic Antidepressants  300 ng/ml  Oxycodone                  100 ng/ml  Propoxyphene               300 ng/ml  Buprenorphine               10 ng/ml    The normal value for all drugs tested is negative. This report includes unconfirmed screening results, with the cutoff values listed, to be used for medical treatment purposes only.  Unconfirmed results must not be used for non-medical purposes such as employment or legal testing.  Clinical consideration should be applied to any drug of abuse test, particularly when  unconfirmed results are used.      Urinalysis With Microscopic If Indicated (No Culture) - Urine, Clean Catch [[id number]]  (Abnormal) Collected: 01/03/21 2015    Specimen: Urine, Clean Catch Updated: 01/03/21 2031     Color, UA Yellow     Appearance, UA Clear     pH, UA 6.0     Specific Gravity, UA 1.034     Comment: Result obtained by Refractometer        Glucose,  mg/dL (1+)     Ketones, UA 40 mg/dL (2+)     Bilirubin, UA Small (1+)     Blood, UA Trace     Protein, UA >=300 mg/dL (3+)     Leuk Esterase, UA Negative     Nitrite, UA Negative     Urobilinogen, UA 0.2 E.U./dL    CBC & Differential [[id number]]  (Abnormal) Collected: 01/03/21 2011    Specimen: Blood from Arm, Left Updated: 01/03/21 2023    Narrative:      The following orders were created for panel order CBC & Differential.  Procedure                               Abnormality         Status                     ---------                               -----------         ------                     CBC Auto Differential[[id number]]        Abnormal            Final result                 Please view results for these tests on the individual orders.    CBC Auto Differential [[id number]]  (Abnormal) Collected: 01/03/21 2011    Specimen: Blood from Arm, Left Updated: 01/03/21 2023     WBC 12.14 10*3/mm3      RBC 4.67 10*6/mm3      Hemoglobin 14.6 g/dL      Hematocrit 41.5 %      MCV 88.9 fL      MCH 31.3 pg      MCHC 35.2 g/dL      RDW 13.5 %      RDW-SD 44.3 fl      MPV 9.6 fL      Platelets 358 10*3/mm3      Neutrophil % 77.8 %      Lymphocyte % 14.7 %      Monocyte % 6.8 %      Eosinophil % 0.0 %      Basophil % 0.3 %      Immature Grans % 0.4 %      Neutrophils, Absolute 9.44 10*3/mm3      Lymphocytes, Absolute 1.78 10*3/mm3      Monocytes, Absolute 0.83 10*3/mm3      Eosinophils, Absolute 0.00 10*3/mm3      Basophils, Absolute 0.04 10*3/mm3      Immature Grans, Absolute 0.05 10*3/mm3      nRBC 0.0 /100 WBC         Imaging Results (Last 24 Hours)      ** No results found for the last 24 hours. **            Assessment:    Active Hospital Problems    Diagnosis   • Alcoholic ketoacidosis             Plan:  Acute alcohol withdrawal  Alcoholism in history  Hypomagnesemia  Malignant hypertension  Ketoacidosis  Intractable nausea and vomiting  Diabetes with hyperglycemia  Proteinuria    We will make n.p.o.  Placed on alcohol withdrawal protocol  Electrolyte replacement protocol  Several rounds of IV blood pressure medicine has been given we will continue as needed  Fluids  Check lactic acid  Repeat labs to check for anion gap which is 21  Patient has been evaluated by psychiatric services  As needed antiemetics will be given  We will monitor the blood sugars if continues to increase or anion gap increases will place on an insulin drip  Protein noted in the urine we will give aggressive fluids.  May need to have work-up as an outpatient.  Secondary  to her diabetes    Protonix for GI prophylaxis    SCDs for DVT prophylaxis in light of alcohol history    Full code    I discussed the patient's findings and my recommendations with: *Patient and fiancé at the bedside    Drew Dawkins MD

## 2021-01-04 NOTE — PLAN OF CARE
Goal Outcome Evaluation:  Plan of Care Reviewed With: patient  Progress: no change  Outcome Summary: Pt resting comfortably. Potassium replacement initiated. No concerns voiced.

## 2021-01-04 NOTE — PROGRESS NOTES
[facility] Medicine Services  INPATIENT PROGRESS NOTE    Length of Stay: 0  Date of Admission: 1/3/2021  Primary Care Physician: Provider, No Known    Subjective   Chief Complaint: No new complaints.    HPI: Patient is seen for follow-up.  She is 40-year-old female with history of alcoholism, depressive disorder, nicotine dependence, hypothyroidism, diabetes mellitus who was admitted for alcohol withdrawal with alcohol-related ketoacidosis and accelerated hypertension.  She is doing better, slightly deconditioned, eager to start eating and voices no new complaints.    Review of Systems   Constitutional: Positive for activity change and fatigue. Negative for appetite change, chills, diaphoresis and fever.   HENT: Negative for trouble swallowing and voice change.    Eyes: Negative for photophobia and visual disturbance.   Respiratory: Negative for cough, choking, chest tightness, shortness of breath, wheezing and stridor.    Cardiovascular: Negative for chest pain, palpitations and leg swelling.   Gastrointestinal: Negative for abdominal distention, abdominal pain, blood in stool, constipation, diarrhea, nausea and vomiting.   Endocrine: Negative for cold intolerance, heat intolerance, polydipsia, polyphagia and polyuria.   Genitourinary: Negative for decreased urine volume, difficulty urinating, dysuria, enuresis, flank pain, frequency, hematuria and urgency.   Musculoskeletal: Negative for arthralgias, gait problem, myalgias, neck pain and neck stiffness.   Skin: Negative for pallor, rash and wound.   Neurological: Negative for dizziness, tremors, seizures, syncope, facial asymmetry, speech difficulty, weakness, light-headedness, numbness and headaches.   Hematological: Does not bruise/bleed easily.   Psychiatric/Behavioral: Negative for agitation, behavioral problems and confusion.       Objective    Temp:  [97.1 °F (36.2 °C)-97.9 °F (36.6 °C)] 97.6 °F (36.4 °C)  Heart  Rate:  [] 80  Resp:  [18-22] 18  BP: (158-214)/() 174/96    Physical Exam  Vitals signs and nursing note reviewed.   Constitutional:       General: She is not in acute distress.     Appearance: She is well-developed. She is obese. She is not diaphoretic.   HENT:      Head: Normocephalic and atraumatic.      Right Ear: External ear normal.      Left Ear: External ear normal.      Nose: Nose normal.   Eyes:      Conjunctiva/sclera: Conjunctivae normal.      Pupils: Pupils are equal, round, and reactive to light.   Neck:      Musculoskeletal: Normal range of motion and neck supple.      Thyroid: No thyromegaly.      Vascular: No JVD.   Cardiovascular:      Rate and Rhythm: Normal rate and regular rhythm.      Heart sounds: Normal heart sounds. No murmur. No friction rub. No gallop.    Pulmonary:      Effort: Pulmonary effort is normal. No respiratory distress.      Breath sounds: Normal breath sounds. No stridor. No wheezing or rales.   Chest:      Chest wall: No tenderness.   Abdominal:      General: Bowel sounds are normal. There is no distension.      Palpations: Abdomen is soft. There is no mass.      Tenderness: There is no abdominal tenderness. There is no guarding or rebound.      Hernia: No hernia is present.   Musculoskeletal: Normal range of motion.         General: No tenderness or deformity.   Skin:     General: Skin is warm and dry.      Coloration: Skin is not pale.      Findings: No erythema or rash.   Neurological:      General: No focal deficit present.      Mental Status: She is alert and oriented to person, place, and time.      Cranial Nerves: No cranial nerve deficit.      Sensory: No sensory deficit.      Motor: Tremor present. No weakness.      Coordination: Coordination normal.      Deep Tendon Reflexes: Reflexes are normal and symmetric. Reflexes normal.   Psychiatric:         Mood and Affect: Mood normal.         Behavior: Behavior normal. Behavior is cooperative.         Thought  Content: Thought content normal.         Judgment: Judgment normal.           Medication Review:    Current Facility-Administered Medications:   •  albuterol sulfate HFA (PROVENTIL HFA;VENTOLIN HFA;PROAIR HFA) inhaler 2 puff, 2 puff, Inhalation, Q6H PRN, Danial Foster MD  •  bisacodyl (DULCOLAX) EC tablet 5 mg, 5 mg, Oral, Daily PRN, Drew Dawkins MD  •  dextrose (D50W) 25 g/ 50mL Intravenous Solution 25 g, 25 g, Intravenous, Q15 Min PRN, Drew Dawkins MD  •  dextrose (GLUTOSE) oral gel 15 g, 15 g, Oral, Q15 Min PRN, Drew Dawkins MD  •  dextrose 5 % and sodium chloride 0.9 % with KCl 20 mEq/L infusion, 125 mL/hr, Intravenous, Continuous, Malcolm Chandra MD, Last Rate: 125 mL/hr at 01/04/21 0750, 125 mL/hr at 01/04/21 0750  •  escitalopram (LEXAPRO) tablet 20 mg, 20 mg, Oral, Daily, Danial Foster MD, 20 mg at 01/04/21 0935  •  glucagon (human recombinant) (GLUCAGEN DIAGNOSTIC) injection 1 mg, 1 mg, Subcutaneous, Q15 Min PRN, Drew Dawkins MD  •  hydrALAZINE (APRESOLINE) injection 10 mg, 10 mg, Intravenous, Q4H PRN, Drew Dawkins MD  •  insulin aspart (novoLOG) injection 0-7 Units, 0-7 Units, Subcutaneous, TID AC, Drew Dawkins MD, 2 Units at 01/04/21 0918  •  insulin detemir (LEVEMIR) injection 10 Units, 10 Units, Subcutaneous, Nightly, Danial Foster MD  •  LORazepam (ATIVAN) injection 1 mg, 1 mg, Intravenous, Q6H, 1 mg at 01/04/21 0923 **FOLLOWED BY** [START ON 1/5/2021] LORazepam (ATIVAN) injection 1 mg, 1 mg, Intravenous, Q8H, Drew Dawkins MD  •  LORazepam (ATIVAN) tablet 1 mg, 1 mg, Oral, Q2H PRN **OR** LORazepam (ATIVAN) injection 1 mg, 1 mg, Intravenous, Q2H PRN **OR** LORazepam (ATIVAN) tablet 2 mg, 2 mg, Oral, Q1H PRN **OR** LORazepam (ATIVAN) injection 2 mg, 2 mg, Intravenous, Q1H PRN **OR** LORazepam (ATIVAN)  injection 2 mg, 2 mg, Intravenous, Q15 Min PRN, 2 mg at 01/04/21 0517 **OR** LORazepam (ATIVAN) injection 2 mg, 2 mg, Intramuscular, Q15 Min PRN **OR** LORazepam (ATIVAN) tablet 4 mg, 4 mg, Oral, Q1H PRN **OR** LORazepam (ATIVAN) injection 4 mg, 4 mg, Intravenous, Q1H PRN, Drwe Dawkins MD  •  LORazepam (ATIVAN) injection 2 mg, 2 mg, Intravenous, Once, Arnaldo Castillo, APRN  •  magnesium hydroxide (MILK OF MAGNESIA) suspension 2400 mg/10mL 10 mL, 10 mL, Oral, Daily PRN, Drew Dawkins MD  •  metoprolol tartrate (LOPRESSOR) injection 5 mg, 5 mg, Intravenous, Q6H PRN, Drew Dawkins MD  •  ondansetron (ZOFRAN) tablet 4 mg, 4 mg, Oral, Q6H PRN **OR** ondansetron (ZOFRAN) injection 4 mg, 4 mg, Intravenous, Q6H PRN, Drew Dawkins MD  •  pantoprazole (PROTONIX) EC tablet 40 mg, 40 mg, Oral, QAM, Danial Foster MD, 40 mg at 01/04/21 0935  •  potassium chloride (MICRO-K) CR capsule 40 mEq, 40 mEq, Oral, PRN, 40 mEq at 01/04/21 0934 **OR** potassium chloride (KLOR-CON) packet 40 mEq, 40 mEq, Oral, PRN **OR** potassium chloride 10 mEq in 100 mL IVPB, 10 mEq, Intravenous, Q1H PRN, Drew Dawkins MD  •  [COMPLETED] Insert peripheral IV, , , Once **AND** sodium chloride 0.9 % flush 10 mL, 10 mL, Intravenous, PRN, Arnaldo Csatillo, APRN  •  [COMPLETED] Insert peripheral IV, , , Once **AND** sodium chloride 0.9 % flush 10 mL, 10 mL, Intravenous, PRN, Arnaldo Castillo, APRN  •  sodium chloride 0.9 % flush 10 mL, 10 mL, Intravenous, Q12H, Drew Dawkins MD, 10 mL at 01/04/21 0918  •  sodium chloride 0.9 % flush 10 mL, 10 mL, Intravenous, PRN, Drew Dawkins MD  •  thiamine (B-1) 100 mg, folic acid 1 mg in sodium chloride 0.9 % 1,000 mL infusion, 100 mL/hr, Intravenous, Daily, Drew Dawkins MD, Last Rate: 100 mL/hr at 01/04/21 0918, 100  mL/hr at 01/04/21 0918  •  traZODone (DESYREL) tablet 50 mg, 50 mg, Oral, Nightly PRN, Danial Foster MD    Results Review:  I have reviewed the labs, radiology results, and diagnostic studies.    Laboratory Data:   Results from last 7 days   Lab Units 01/04/21  0327 01/03/21 2011   SODIUM mmol/L 133* 135*   POTASSIUM mmol/L 3.3* 3.4*   CHLORIDE mmol/L 96* 92*   CO2 mmol/L 27.0 22.0   BUN mg/dL 10 15   CREATININE mg/dL 0.69 0.91   GLUCOSE mg/dL 194* 232*   CALCIUM mg/dL 8.7 9.6   BILIRUBIN mg/dL 0.9 1.0   ALK PHOS U/L 72 91   ALT (SGPT) U/L 19 27   AST (SGOT) U/L 22 27   ANION GAP mmol/L 10.0 21.0*     Estimated Creatinine Clearance: 118.4 mL/min (by C-G formula based on SCr of 0.69 mg/dL).  Results from last 7 days   Lab Units 01/04/21  0327 01/03/21 2011   MAGNESIUM mg/dL 1.7 1.1*   PHOSPHORUS mg/dL 2.8  --          Results from last 7 days   Lab Units 01/04/21  0327 01/03/21 2011   WBC 10*3/mm3 7.76 12.14*   HEMOGLOBIN g/dL 12.4 14.6   HEMATOCRIT % 35.5 41.5   PLATELETS 10*3/mm3 243 358           Culture Data:   No results found for: BLOODCX  No results found for: URINECX  No results found for: RESPCX  No results found for: WOUNDCX  No results found for: STOOLCX  No components found for: BODYFLD    Radiology Data:   Imaging Results (Last 24 Hours)     ** No results found for the last 24 hours. **          I have reviewed the patient's current medications.     Assessment/Plan     Hospital Problem List:  Active Problems:    Alcoholic ketoacidosis    History of alcoholism with alcohol withdrawal: Continue CIWA protocol with prophylactic thiamine.  Reactive acidosis has resolved.    Hypokalemia: Replete potassium and monitor levels.    Hyponatremia (likely hypovolemic): Continue isotonic saline, restrict free water and monitor BMP.    Hypertension: Patient was unmedicated prior to admission.  Begin Cozaar with adjustments as needed for optimal blood pressure control.      Diabetes mellitus: Resume basal  insulin with Accu-Cheks and sliding scale insulin.  Hold Metformin for now.    Nicotine dependence: Nicotine patch was offered to the patient but she declined.  Nicotine cessation counseling has been discussed the patient.    Depressive disorder: Continue home medications.    Deconditioning: Consult PT and OT.    Continue GI and DVT prophylaxis.    Discharge Planning: In progress.    Danial Foster MD   01/04/21   10:34 CST

## 2021-01-05 LAB
ANION GAP SERPL CALCULATED.3IONS-SCNC: 8 MMOL/L (ref 5–15)
BASOPHILS # BLD AUTO: 0.02 10*3/MM3 (ref 0–0.2)
BASOPHILS NFR BLD AUTO: 0.4 % (ref 0–1.5)
BUN SERPL-MCNC: 6 MG/DL (ref 6–20)
BUN/CREAT SERPL: 9.5 (ref 7–25)
CALCIUM SPEC-SCNC: 8.8 MG/DL (ref 8.6–10.5)
CHLORIDE SERPL-SCNC: 103 MMOL/L (ref 98–107)
CO2 SERPL-SCNC: 23 MMOL/L (ref 22–29)
CREAT SERPL-MCNC: 0.63 MG/DL (ref 0.57–1)
DEPRECATED RDW RBC AUTO: 46 FL (ref 37–54)
EOSINOPHIL # BLD AUTO: 0.03 10*3/MM3 (ref 0–0.4)
EOSINOPHIL NFR BLD AUTO: 0.5 % (ref 0.3–6.2)
ERYTHROCYTE [DISTWIDTH] IN BLOOD BY AUTOMATED COUNT: 13.7 % (ref 12.3–15.4)
GFR SERPL CREATININE-BSD FRML MDRD: 104 ML/MIN/1.73
GLUCOSE BLDC GLUCOMTR-MCNC: 139 MG/DL (ref 70–130)
GLUCOSE BLDC GLUCOMTR-MCNC: 148 MG/DL (ref 70–130)
GLUCOSE BLDC GLUCOMTR-MCNC: 161 MG/DL (ref 70–130)
GLUCOSE BLDC GLUCOMTR-MCNC: 178 MG/DL (ref 70–130)
GLUCOSE BLDC GLUCOMTR-MCNC: 194 MG/DL (ref 70–130)
GLUCOSE BLDC GLUCOMTR-MCNC: 250 MG/DL (ref 70–130)
GLUCOSE SERPL-MCNC: 141 MG/DL (ref 65–99)
HCT VFR BLD AUTO: 38.5 % (ref 34–46.6)
HGB BLD-MCNC: 13 G/DL (ref 12–15.9)
IMM GRANULOCYTES # BLD AUTO: 0.01 10*3/MM3 (ref 0–0.05)
IMM GRANULOCYTES NFR BLD AUTO: 0.2 % (ref 0–0.5)
LYMPHOCYTES # BLD AUTO: 1.8 10*3/MM3 (ref 0.7–3.1)
LYMPHOCYTES NFR BLD AUTO: 32 % (ref 19.6–45.3)
MCH RBC QN AUTO: 31 PG (ref 26.6–33)
MCHC RBC AUTO-ENTMCNC: 33.8 G/DL (ref 31.5–35.7)
MCV RBC AUTO: 91.7 FL (ref 79–97)
MONOCYTES # BLD AUTO: 0.55 10*3/MM3 (ref 0.1–0.9)
MONOCYTES NFR BLD AUTO: 9.8 % (ref 5–12)
NEUTROPHILS NFR BLD AUTO: 3.21 10*3/MM3 (ref 1.7–7)
NEUTROPHILS NFR BLD AUTO: 57.1 % (ref 42.7–76)
NRBC BLD AUTO-RTO: 0 /100 WBC (ref 0–0.2)
PLATELET # BLD AUTO: 244 10*3/MM3 (ref 140–450)
PMV BLD AUTO: 10 FL (ref 6–12)
POTASSIUM SERPL-SCNC: 3.6 MMOL/L (ref 3.5–5.2)
POTASSIUM SERPL-SCNC: 4.4 MMOL/L (ref 3.5–5.2)
RBC # BLD AUTO: 4.2 10*6/MM3 (ref 3.77–5.28)
SODIUM SERPL-SCNC: 134 MMOL/L (ref 136–145)
WBC # BLD AUTO: 5.62 10*3/MM3 (ref 3.4–10.8)

## 2021-01-05 PROCEDURE — 63710000001 INSULIN DETEMIR PER 5 UNITS: Performed by: INTERNAL MEDICINE

## 2021-01-05 PROCEDURE — 63710000001 INSULIN ASPART PER 5 UNITS: Performed by: HOSPITALIST

## 2021-01-05 PROCEDURE — 63710000001 INSULIN ASPART PER 5 UNITS: Performed by: INTERNAL MEDICINE

## 2021-01-05 PROCEDURE — 80048 BASIC METABOLIC PNL TOTAL CA: CPT | Performed by: INTERNAL MEDICINE

## 2021-01-05 PROCEDURE — 25010000002 HYDRALAZINE PER 20 MG: Performed by: HOSPITALIST

## 2021-01-05 PROCEDURE — 25010000002 LORAZEPAM PER 2 MG: Performed by: INTERNAL MEDICINE

## 2021-01-05 PROCEDURE — 25010000002 THIAMINE PER 100 MG: Performed by: INTERNAL MEDICINE

## 2021-01-05 PROCEDURE — 25010000002 THIAMINE PER 100 MG: Performed by: HOSPITALIST

## 2021-01-05 PROCEDURE — 85025 COMPLETE CBC W/AUTO DIFF WBC: CPT | Performed by: INTERNAL MEDICINE

## 2021-01-05 PROCEDURE — 25010000002 LORAZEPAM PER 2 MG: Performed by: HOSPITALIST

## 2021-01-05 PROCEDURE — 82962 GLUCOSE BLOOD TEST: CPT

## 2021-01-05 PROCEDURE — 84132 ASSAY OF SERUM POTASSIUM: CPT | Performed by: HOSPITALIST

## 2021-01-05 PROCEDURE — 97165 OT EVAL LOW COMPLEX 30 MIN: CPT

## 2021-01-05 PROCEDURE — 97161 PT EVAL LOW COMPLEX 20 MIN: CPT

## 2021-01-05 PROCEDURE — 25010000002 HYDRALAZINE PER 20 MG: Performed by: INTERNAL MEDICINE

## 2021-01-05 RX ORDER — SODIUM CHLORIDE 9 MG/ML
75 INJECTION, SOLUTION INTRAVENOUS CONTINUOUS
Status: DISCONTINUED | OUTPATIENT
Start: 2021-01-05 | End: 2021-01-06 | Stop reason: HOSPADM

## 2021-01-05 RX ADMIN — LOSARTAN POTASSIUM 75 MG: 50 TABLET, FILM COATED ORAL at 08:50

## 2021-01-05 RX ADMIN — PANTOPRAZOLE SODIUM 40 MG: 40 TABLET, DELAYED RELEASE ORAL at 06:19

## 2021-01-05 RX ADMIN — LORAZEPAM 1 MG: 2 INJECTION INTRAMUSCULAR; INTRAVENOUS at 10:12

## 2021-01-05 RX ADMIN — HYDRALAZINE HYDROCHLORIDE 10 MG: 20 INJECTION INTRAMUSCULAR; INTRAVENOUS at 02:18

## 2021-01-05 RX ADMIN — LORAZEPAM 1 MG: 2 INJECTION INTRAMUSCULAR; INTRAVENOUS at 17:45

## 2021-01-05 RX ADMIN — ESCITALOPRAM OXALATE 20 MG: 10 TABLET ORAL at 08:51

## 2021-01-05 RX ADMIN — LORAZEPAM 1 MG: 2 INJECTION INTRAMUSCULAR; INTRAVENOUS at 02:16

## 2021-01-05 RX ADMIN — INSULIN ASPART 2 UNITS: 100 INJECTION, SOLUTION INTRAVENOUS; SUBCUTANEOUS at 17:20

## 2021-01-05 RX ADMIN — SODIUM CHLORIDE 100 ML/HR: 9 INJECTION, SOLUTION INTRAVENOUS at 00:01

## 2021-01-05 RX ADMIN — LORAZEPAM 2 MG: 2 TABLET ORAL at 20:56

## 2021-01-05 RX ADMIN — METOPROLOL TARTRATE 25 MG: 25 TABLET, FILM COATED ORAL at 08:51

## 2021-01-05 RX ADMIN — FOLIC ACID 100 ML/HR: 5 INJECTION, SOLUTION INTRAMUSCULAR; INTRAVENOUS; SUBCUTANEOUS at 08:30

## 2021-01-05 RX ADMIN — INSULIN DETEMIR 10 UNITS: 100 INJECTION, SOLUTION SUBCUTANEOUS at 19:41

## 2021-01-05 RX ADMIN — POTASSIUM CHLORIDE 40 MEQ: 10 CAPSULE, COATED, EXTENDED RELEASE ORAL at 14:20

## 2021-01-05 RX ADMIN — SODIUM CHLORIDE, PRESERVATIVE FREE 10 ML: 5 INJECTION INTRAVENOUS at 08:51

## 2021-01-05 RX ADMIN — INSULIN ASPART 4 UNITS: 100 INJECTION, SOLUTION INTRAVENOUS; SUBCUTANEOUS at 11:35

## 2021-01-05 RX ADMIN — POTASSIUM CHLORIDE 40 MEQ: 10 CAPSULE, COATED, EXTENDED RELEASE ORAL at 09:45

## 2021-01-05 RX ADMIN — METOPROLOL TARTRATE 25 MG: 25 TABLET, FILM COATED ORAL at 19:37

## 2021-01-05 RX ADMIN — SODIUM CHLORIDE 75 ML/HR: 9 INJECTION, SOLUTION INTRAVENOUS at 15:37

## 2021-01-05 NOTE — PROGRESS NOTES
[facility] Medicine Services  INPATIENT PROGRESS NOTE    Length of Stay: 0  Date of Admission: 1/3/2021  Primary Care Physician: Provider, No Known    Subjective   Chief Complaint: No new complaints.    HPI: Patient is seen for follow-up.  She is 40-year-old female with history of alcoholism, depressive disorder, nicotine dependence, hypothyroidism, diabetes mellitus who was admitted for alcohol withdrawal with alcohol-related ketoacidosis and accelerated hypertension.  She is doing better, less deconditioned, tolerating prescribed diet and voices no new complaints.     Review of Systems   Constitutional: Positive for activity change and fatigue. Negative for appetite change, chills, diaphoresis and fever.   HENT: Negative for trouble swallowing and voice change.    Eyes: Negative for photophobia and visual disturbance.   Respiratory: Negative for cough, choking, chest tightness, shortness of breath, wheezing and stridor.    Cardiovascular: Negative for chest pain, palpitations and leg swelling.   Gastrointestinal: Negative for abdominal distention, abdominal pain, blood in stool, constipation, diarrhea, nausea and vomiting.   Endocrine: Negative for cold intolerance, heat intolerance, polydipsia, polyphagia and polyuria.   Genitourinary: Negative for decreased urine volume, difficulty urinating, dysuria, enuresis, flank pain, frequency, hematuria and urgency.   Musculoskeletal: Negative for arthralgias, gait problem, myalgias, neck pain and neck stiffness.   Skin: Negative for pallor, rash and wound.   Neurological: Negative for dizziness, tremors, seizures, syncope, facial asymmetry, speech difficulty, weakness, light-headedness, numbness and headaches.   Hematological: Does not bruise/bleed easily.   Psychiatric/Behavioral: Negative for agitation, behavioral problems and confusion.       Objective    Temp:  [98 °F (36.7 °C)-98.6 °F (37 °C)] 98.6 °F (37 °C)  Heart Rate:   [] 83  Resp:  [18] 18  BP: (130-182)/() 130/66    Physical Exam  Vitals signs and nursing note reviewed.   Constitutional:       General: She is not in acute distress.     Appearance: She is well-developed. She is obese. She is not diaphoretic.   HENT:      Head: Normocephalic and atraumatic.      Right Ear: External ear normal.      Left Ear: External ear normal.      Nose: Nose normal.   Eyes:      Conjunctiva/sclera: Conjunctivae normal.      Pupils: Pupils are equal, round, and reactive to light.   Neck:      Musculoskeletal: Normal range of motion and neck supple.      Thyroid: No thyromegaly.      Vascular: No JVD.   Cardiovascular:      Rate and Rhythm: Normal rate and regular rhythm.      Heart sounds: Normal heart sounds. No murmur. No friction rub. No gallop.    Pulmonary:      Effort: Pulmonary effort is normal. No respiratory distress.      Breath sounds: Normal breath sounds. No stridor. No wheezing or rales.   Chest:      Chest wall: No tenderness.   Abdominal:      General: Bowel sounds are normal. There is no distension.      Palpations: Abdomen is soft. There is no mass.      Tenderness: There is no abdominal tenderness. There is no guarding or rebound.      Hernia: No hernia is present.   Musculoskeletal: Normal range of motion.         General: No tenderness or deformity.   Skin:     General: Skin is warm and dry.      Coloration: Skin is not pale.      Findings: No erythema or rash.   Neurological:      General: No focal deficit present.      Mental Status: She is alert and oriented to person, place, and time.      Cranial Nerves: No cranial nerve deficit.      Sensory: No sensory deficit.      Motor: No weakness or tremor.      Coordination: Coordination normal.      Deep Tendon Reflexes: Reflexes are normal and symmetric. Reflexes normal.   Psychiatric:         Mood and Affect: Mood normal.         Behavior: Behavior normal. Behavior is cooperative.         Thought Content: Thought  content normal.         Judgment: Judgment normal.           Medication Review:    Current Facility-Administered Medications:   •  albuterol sulfate HFA (PROVENTIL HFA;VENTOLIN HFA;PROAIR HFA) inhaler 2 puff, 2 puff, Inhalation, Q6H PRN, Danial Foster MD  •  bisacodyl (DULCOLAX) EC tablet 5 mg, 5 mg, Oral, Daily PRN, Drew Dawkins MD  •  dextrose (D50W) 25 g/ 50mL Intravenous Solution 25 g, 25 g, Intravenous, Q15 Min PRN, Drew Dawkins MD  •  dextrose (GLUTOSE) oral gel 15 g, 15 g, Oral, Q15 Min PRN, Drew Dawkins MD  •  escitalopram (LEXAPRO) tablet 20 mg, 20 mg, Oral, Daily, Danial Foster MD, 20 mg at 01/05/21 0851  •  glucagon (human recombinant) (GLUCAGEN DIAGNOSTIC) injection 1 mg, 1 mg, Subcutaneous, Q15 Min PRN, Drew Dawkins MD  •  hydrALAZINE (APRESOLINE) injection 10 mg, 10 mg, Intravenous, Q4H PRN, Drew Dawkins MD, 10 mg at 01/05/21 0218  •  insulin aspart (novoLOG) injection 0-7 Units, 0-7 Units, Subcutaneous, TID AC, Drew Dawkins MD, 2 Units at 01/04/21 1638  •  insulin detemir (LEVEMIR) injection 10 Units, 10 Units, Subcutaneous, Nightly, Danial Foster MD, 10 Units at 01/04/21 2001  •  [COMPLETED] LORazepam (ATIVAN) injection 1 mg, 1 mg, Intravenous, Q6H, 1 mg at 01/04/21 2003 **FOLLOWED BY** LORazepam (ATIVAN) injection 1 mg, 1 mg, Intravenous, Q8H, Drew Dawkins MD, 1 mg at 01/05/21 1012  •  LORazepam (ATIVAN) tablet 1 mg, 1 mg, Oral, Q2H PRN **OR** LORazepam (ATIVAN) injection 1 mg, 1 mg, Intravenous, Q2H PRN **OR** LORazepam (ATIVAN) tablet 2 mg, 2 mg, Oral, Q1H PRN **OR** LORazepam (ATIVAN) injection 2 mg, 2 mg, Intravenous, Q1H PRN **OR** LORazepam (ATIVAN) injection 2 mg, 2 mg, Intravenous, Q15 Min PRN, 2 mg at 01/04/21 0517 **OR** LORazepam (ATIVAN) injection 2 mg, 2 mg, Intramuscular, Q15 Min PRN  **OR** LORazepam (ATIVAN) tablet 4 mg, 4 mg, Oral, Q1H PRN **OR** LORazepam (ATIVAN) injection 4 mg, 4 mg, Intravenous, Q1H PRN, Drew Dawkins MD  •  LORazepam (ATIVAN) injection 2 mg, 2 mg, Intravenous, Once, Arnaldo Castillo APRN  •  losartan (COZAAR) tablet 75 mg, 75 mg, Oral, Q24H, Danial Foster MD, 75 mg at 01/05/21 0850  •  magnesium hydroxide (MILK OF MAGNESIA) suspension 2400 mg/10mL 10 mL, 10 mL, Oral, Daily PRN, Drew Dwakins MD  •  metoprolol tartrate (LOPRESSOR) injection 5 mg, 5 mg, Intravenous, Q6H PRN, Drew Dawkins MD, 5 mg at 01/04/21 1647  •  metoprolol tartrate (LOPRESSOR) tablet 25 mg, 25 mg, Oral, Q12H, Danial Foster MD, 25 mg at 01/05/21 0851  •  ondansetron (ZOFRAN) tablet 4 mg, 4 mg, Oral, Q6H PRN **OR** ondansetron (ZOFRAN) injection 4 mg, 4 mg, Intravenous, Q6H PRN, Drew Dawkins MD, 4 mg at 01/04/21 2000  •  pantoprazole (PROTONIX) EC tablet 40 mg, 40 mg, Oral, QAM, Danial Foster MD, 40 mg at 01/05/21 0619  •  potassium chloride (MICRO-K) CR capsule 40 mEq, 40 mEq, Oral, PRN, 40 mEq at 01/05/21 0945 **OR** potassium chloride (KLOR-CON) packet 40 mEq, 40 mEq, Oral, PRN **OR** potassium chloride 10 mEq in 100 mL IVPB, 10 mEq, Intravenous, Q1H PRN, Drew Dawkins MD  •  [COMPLETED] Insert peripheral IV, , , Once **AND** sodium chloride 0.9 % flush 10 mL, 10 mL, Intravenous, PRN, Arnaldo Castillo, APRN  •  [COMPLETED] Insert peripheral IV, , , Once **AND** sodium chloride 0.9 % flush 10 mL, 10 mL, Intravenous, PRN, Arnaldo Castillo, APRN  •  sodium chloride 0.9 % flush 10 mL, 10 mL, Intravenous, Q12H, Drew Dawkins MD, 10 mL at 01/05/21 0851  •  sodium chloride 0.9 % flush 10 mL, 10 mL, Intravenous, PRN, Drew Dawkins MD  •  sodium chloride 0.9 % infusion, 100 mL/hr, Intravenous, Continuous, [place],  Danial VILLA MD, Last Rate: 100 mL/hr at 01/05/21 0610, 100 mL/hr at 01/05/21 0610  •  thiamine (B-1) 100 mg, folic acid 1 mg in sodium chloride 0.9 % 1,000 mL infusion, 100 mL/hr, Intravenous, Daily, Drew Dawkins MD, Last Rate: 100 mL/hr at 01/05/21 0830, 100 mL/hr at 01/05/21 0830  •  traZODone (DESYREL) tablet 50 mg, 50 mg, Oral, Nightly PRN, Danial Foster MD    Results Review:  I have reviewed the labs, radiology results, and diagnostic studies.    Laboratory Data:   Results from last 7 days   Lab Units 01/05/21 0405 01/04/21 1421 01/04/21 0327 01/03/21 2011   SODIUM mmol/L 134*  --  133* 135*   POTASSIUM mmol/L 3.6 4.1 3.3* 3.4*   CHLORIDE mmol/L 103  --  96* 92*   CO2 mmol/L 23.0  --  27.0 22.0   BUN mg/dL 6  --  10 15   CREATININE mg/dL 0.63  --  0.69 0.91   GLUCOSE mg/dL 141*  --  194* 232*   CALCIUM mg/dL 8.8  --  8.7 9.6   BILIRUBIN mg/dL  --   --  0.9 1.0   ALK PHOS U/L  --   --  72 91   ALT (SGPT) U/L  --   --  19 27   AST (SGOT) U/L  --   --  22 27   ANION GAP mmol/L 8.0  --  10.0 21.0*     Estimated Creatinine Clearance: 128.2 mL/min (by C-G formula based on SCr of 0.63 mg/dL).  Results from last 7 days   Lab Units 01/04/21 0327 01/03/21 2011   MAGNESIUM mg/dL 1.7 1.1*   PHOSPHORUS mg/dL 2.8  --          Results from last 7 days   Lab Units 01/05/21  0405 01/04/21 0327 01/03/21 2011   WBC 10*3/mm3 5.62 7.76 12.14*   HEMOGLOBIN g/dL 13.0 12.4 14.6   HEMATOCRIT % 38.5 35.5 41.5   PLATELETS 10*3/mm3 244 243 358           Culture Data:   No results found for: BLOODCX  No results found for: URINECX  No results found for: RESPCX  No results found for: WOUNDCX  No results found for: STOOLCX  No components found for: BODYFLD    Radiology Data:   Imaging Results (Last 24 Hours)     ** No results found for the last 24 hours. **          I have reviewed the patient's current medications.     Assessment/Plan     Hospital Problem List:  Active Problems:    Alcoholic  ketoacidosis    History of alcoholism with alcohol withdrawal: Improving.  Continue CIWA protocol with prophylactic thiamine.  Reactive acidosis has resolved.    Hypokalemia: Resolved.      Hyponatremia (likely hypovolemic): Improving.  Continue isotonic saline, restrict free water and monitor BMP.    Hypertension: Patient was unmedicated prior to admission.  Continue Cozaar, add beta-blocker with adjustments as needed for optimal blood pressure control.      Diabetes mellitus: Stable.  Continue  basal insulin with Accu-Cheks and sliding scale insulin.  Hold Metformin for now.    Nicotine dependence: Nicotine patch was offered to the patient but she declined.  Nicotine cessation counseling has been discussed the patient.    Depressive disorder: Continue home medications.    Deconditioning: Continue PT and OT.    Continue GI and DVT prophylaxis.    Transfer out of stepdown unit.    Discharge Planning: Likely discharge in a..     Danial Foster MD   01/05/21   10:35 CST

## 2021-01-05 NOTE — PAYOR COMM NOTE
"Staci Blevins RN [facility]  [phone] fax  [phone] phone  Continued Stay Review  Was OBV 1/3-1/4, Changed to inpt 1/5/2021    Amadou Gibson (40 y.o. Female)     Date of Birth Social Security Number Address Home Phone MRN    1980  [id number]  [facility] [address] [phone] [id number]    Buddhism Marital Status          None Single       Admission Date Admission Type Admitting Provider Attending Provider Department, Room/Bed    1/3/21 Emergency Echendu, MD Mariza Woodward Takasha Latoya Renee, MD [facility] CRITICAL CARE STEPDOWN, 05/A    Discharge Date Discharge Disposition Discharge Destination                       Attending Provider: Drew Dawkins MD    Allergies: Lisinopril, Penicillins    Isolation: None   Infection: None   Code Status: CPR    Ht: 167.6 cm (66\")   Wt: 85.5 kg (188 lb 7.9 oz)    Admission Cmt: None   Principal Problem: None                Active Insurance as of 1/3/2021     Primary Coverage     Payor Plan Insurance Group Employer/Plan Group    [facility] [place] [facility] [place]      Payor Plan Address Payor Plan Phone Number Payor Plan Fax Number Effective Dates    [address] [address]   7/1/2016 - None Entered    [place] [address]       Subscriber Name Subscriber Birth Date Member ID       [id number] 1980 [id number]                 Emergency Contacts      (Rel.) Home Phone Work Phone Mobile Phone    Boone Gibson (Father) [phone] -- [phone]               History & Physical      Drew Dawkins MD at 01/04/21 0157          .        [facility] Medicine Admission      Date of Admission: 1/3/2021  Patient seen and evaluated on 1/3/2021 however note is written after midnight    Primary Care Physician: Provider, No Known      Chief Complaint: *Notable nausea vomiting " anxiety    HPI:*  Patient is a 40-year-old female who presents to the [facility] emergency room nausea vomiting and anxiety.  She has a past medical history of diabetes, alcohol abuse anxiety asthma hypertension.  He normally drinks 1/5 of whiskey a day and stopped drinking 24 hours prior to admission.  She reports having several episodes of nausea and vomiting and she felt like she was having a panic attack with palpitations.  She denies any suicidal or homicidal ideations.  She states that she has been having anxiety.  She denies any sick contacts fever chills diarrhea.  She was worked up in the emergency room found to have a magnesium of 1.1 blood pressure in the 200s systolic ketones in the urine and anion gap of 21  Concurrent Medical History:  has a past medical history of Acute bronchitis, Acute conjunctivitis, Acute pharyngitis, Alcohol abuse, Allergic rhinitis due to pollen, Anxiety state, Asthma, Constipation, Depressive disorder, Diarrhea, GERD (gastroesophageal reflux disease), Hypertensive disorder, Insomnia, Knee pain, Migraine, Nausea, Nausea and vomiting, Psychiatric illness, Rib pain, Tobacco dependence syndrome, Type 2 diabetes mellitus (CMS/[facility]), URI (upper respiratory infection), Viral intestinal infection, unspecified, and Wheezing.    Past Surgical History:  has a past surgical history that includes Injection of Medication (2016); Pap Smear (2010); Injection of Medication (2015); Tubal ligation ();  section; and Abdominal surgery.    Family History: family history includes ADD / ADHD in her son; Alcohol abuse in her father; Bipolar disorder in her sister; Colon cancer in her maternal grandmother; Drug abuse in her mother and sister; Heart attack in her father; Heart disease in her maternal grandfather; Hepatitis in her sister; Suicide Attempts in her mother; Tuberculosis in her paternal grandfather. *    Social History:  reports that she has been  smoking cigarettes. She has a 8.00 pack-year smoking history. She has never used smokeless tobacco. She reports current alcohol use of about 6.0 standard drinks of alcohol per week. She reports that she does not use drugs.    Allergies:   Allergies   Allergen Reactions   • Lisinopril Cough     Is currently prescribed lisinopril for her HTN, but isn't taking it due to cough; hasn't been back to see the MD   • Penicillins Unknown (See Comments)     Pt was five and unsure of reaction       Medications:   Prior to Admission medications    Medication Sig Start Date End Date Taking? Authorizing Provider   albuterol (PROVENTIL HFA;VENTOLIN HFA) 108 (90 BASE) MCG/ACT inhaler Inhale 2 puffs 4 (Four) Times a Day.    Denisse Gage MD   Blood Glucose Monitoring Suppl (ONE TOUCH ULTRA 2) W/DEVICE kit  12/9/16   Denisse Gage MD   escitalopram (LEXAPRO) 20 MG tablet Take 1 tablet by mouth Daily. 11/30/18   Deisy Kahn MD   insulin aspart (novoLOG) 100 UNIT/ML injection Inject 0-9 Units under the skin 4 (Four) Times a Day Before Meals & at Bedtime. 2/28/18   Blue Leonard PA   insulin detemir (LEVEMIR) 100 UNIT/ML injection Inject 10 Units under the skin Every Night. 2/28/18   Blue Leonard PA   Lancets 28G misc 1 each. 12/9/16   Denisse aGge MD   metFORMIN (GLUCOPHAGE) 1000 MG tablet Take 1,000 mg by mouth 2 (Two) Times a Day. 12/14/16   Denisse Gage MD   naltrexone (DEPADE) 50 MG tablet Take 1 tablet by mouth Daily. 12/1/18   Deisy Kahn MD   nicotine (NICODERM CQ) 21 MG/24HR patch Place 1 patch on the skin Daily. 2/18/17   Christine Gordon APRN   omeprazole (priLOSEC) 40 MG capsule TAKE ONE CAPSULE BY MOUTH DAILY 11/10/16   Bradley Bliss MD   traZODone (DESYREL) 50 MG tablet Take 1 tablet by mouth At Night As Needed for Sleep (insomnia). 11/30/18   Deisy Kahn MD       Review of Systems:  Review of Systems   12 point review of systems obtained pertinent  positives and negatives noted above in the HPI otherwise complete ROS is negative except as mentioned above.    Physical Exam:   Temp:  [97.1 °F (36.2 °C)-97.9 °F (36.6 °C)] 97.1 °F (36.2 °C)  Heart Rate:  [] 84  Resp:  [18-22] 18  BP: (158-214)/() 158/79  Physical Exam  Vitals signs and nursing note reviewed.   Constitutional:       General: She is not in acute distress.     Appearance: She is normal weight. She is ill-appearing. She is not toxic-appearing or diaphoretic.   HENT:      Head: Normocephalic.      Nose: Nose normal.      Mouth/Throat:      Mouth: Mucous membranes are dry.      Pharynx: Oropharynx is clear.   Eyes:      Conjunctiva/sclera: Conjunctivae normal.      Pupils: Pupils are equal, round, and reactive to light.   Neck:      Musculoskeletal: Neck supple.   Cardiovascular:      Rate and Rhythm: Tachycardia present.      Pulses: Normal pulses.      Heart sounds: Normal heart sounds.   Pulmonary:      Effort: Pulmonary effort is normal.      Breath sounds: Normal breath sounds.   Abdominal:      General: Bowel sounds are normal.      Palpations: Abdomen is soft.   Musculoskeletal:         General: No swelling or deformity.   Skin:     General: Skin is warm.      Capillary Refill: Capillary refill takes less than 2 seconds.   Neurological:      General: No focal deficit present.      Mental Status: She is alert.      Cranial Nerves: No cranial nerve deficit.           Results Reviewed:  I have personally reviewed current lab, radiology, and data and agree with results.  Lab Results (last 24 hours)     Procedure Component Value Units Date/Time    Extra Tubes [[id number]] Collected: 01/03/21 2021    Specimen: Blood, Venous Line Updated: 01/04/21 0030    Narrative:      The following orders were created for panel order Extra Tubes.  Procedure                               Abnormality         Status                     ---------                               -----------         ------                      Light Blue Top[[id number]]                                   Final result               Gold Top - SST[[id number]]                                   Final result               [place] Blood Culture Alex...[[id number]]                      Final result               Gray Top[[id number]]                                         Final result                 Please view results for these tests on the individual orders.    Gray Top [[id number]] Collected: 01/03/21 2021    Specimen: Blood Updated: 01/04/21 0030     Extra Tube Hold for add-ons.     Comment: Auto resulted.       Blood Gas, Arterial - [[id number]]  (Abnormal) Collected: 01/04/21 0008    Specimen: Arterial Blood Updated: 01/04/21 0013     Site Right Radial     Jadiel's Test N/A     pH, Arterial 7.458 pH units      Comment: 83 Value above reference range        pCO2, Arterial 35.4 mm Hg      pO2, Arterial 85.2 mm Hg      HCO3, Arterial 25.0 mmol/L      Base Excess, Arterial 1.4 mmol/L      O2 Saturation, Arterial 97.1 %      Barometric Pressure for Blood Gas 749 mmHg      Modality Room Air     Ventilator Mode NA     Collected by RRT     Comment: Meter: [id number]     :  [id number]       COVID-19 and FLU A/B PCR - Swab, Nasopharynx [[id number]]  (Normal) Collected: 01/03/21 2310    Specimen: Swab from Nasopharynx Updated: 01/04/21 0011     COVID19 Not Detected     Influenza A PCR Not Detected     Influenza B PCR Not Detected    Narrative:      Fact sheet for providers: https://www.fda.gov/media/235847/download    Fact sheet for patients: https://www.fda.gov/media/945384/download    Test performed by PCR.    Acetone [[id number]]  (Abnormal) Collected: 01/03/21 2022    Specimen: Blood Updated: 01/03/21 2347     Acetone Trace    hCG, Serum, Qualitative [[id number]]  (Normal) Collected: 01/03/21 2022    Specimen: Blood Updated: 01/03/21 2347     HCG Qualitative Negative    Light Blue Top [[id number]] Collected: 01/03/21 2022    Specimen: Blood Updated:  01/03/21 2130     Extra Tube hold for add-on     Comment: Auto resulted       Gold Top - SST [[id number]] Collected: 01/03/21 2022    Specimen: Blood Updated: 01/03/21 2130     Extra Tube Hold for add-ons.     Comment: Auto resulted.       [place] Blood Culture Bottle Set [[id number]] Collected: 01/03/21 2021    Specimen: Blood from Arm, Left Updated: 01/03/21 2130     Extra Tube Hold for add-ons.     Comment: Auto resulted.       TSH [[id number]]  (Normal) Collected: 01/03/21 2011    Specimen: Blood from Arm, Left Updated: 01/03/21 2048     TSH 2.720 uIU/mL     Comprehensive Metabolic Panel [[id number]]  (Abnormal) Collected: 01/03/21 2011    Specimen: Blood from Arm, Left Updated: 01/03/21 2042     Glucose 232 mg/dL      BUN 15 mg/dL      Creatinine 0.91 mg/dL      Sodium 135 mmol/L      Potassium 3.4 mmol/L      Chloride 92 mmol/L      CO2 22.0 mmol/L      Calcium 9.6 mg/dL      Total Protein 8.6 g/dL      Albumin 4.70 g/dL      ALT (SGPT) 27 U/L      AST (SGOT) 27 U/L      Alkaline Phosphatase 91 U/L      Total Bilirubin 1.0 mg/dL      eGFR Non African Amer 68 mL/min/1.73      Globulin 3.9 gm/dL      A/G Ratio 1.2 g/dL      BUN/Creatinine Ratio 16.5     Anion Gap 21.0 mmol/L     Narrative:      GFR Normal >60  Chronic Kidney Disease <60  Kidney Failure <15      Lipase [[id number]]  (Normal) Collected: 01/03/21 2011    Specimen: Blood from Arm, Left Updated: 01/03/21 2042     Lipase 23 U/L     Ethanol [[id number]] Collected: 01/03/21 2011    Specimen: Blood from Arm, Left Updated: 01/03/21 2042     Ethanol <10 mg/dL      Ethanol % <0.010 %     Magnesium [[id number]]  (Abnormal) Collected: 01/03/21 2011    Specimen: Blood from Arm, Left Updated: 01/03/21 2042     Magnesium 1.1 mg/dL     Urinalysis, Microscopic Only - Urine, Clean Catch [[id number]]  (Abnormal) Collected: 01/03/21 2015    Specimen: Urine, Clean Catch Updated: 01/03/21 2040     RBC, UA None Seen /HPF      WBC, UA 0-2 /HPF      Bacteria, UA 2+ /HPF       Squamous Epithelial Cells, UA 6-12 /HPF      Hyaline Casts, UA 0-2 /LPF      Methodology Automated Microscopy    Urine Drug Screen - Urine, Clean Catch [[id number]]  (Normal) Collected: 01/03/21 2015    Specimen: Urine, Clean Catch Updated: 01/03/21 2038     THC, Screen, Urine Negative     Phencyclidine (PCP), Urine Negative     Cocaine Screen, Urine Negative     Methamphetamine, Ur Negative     Opiate Screen Negative     Amphetamine Screen, Urine Negative     Benzodiazepine Screen, Urine Negative     Tricyclic Antidepressants Screen Negative     Methadone Screen, Urine Negative     Barbiturates Screen, Urine Negative     Oxycodone Screen, Urine Negative     Propoxyphene Screen Negative     Buprenorphine, Screen, Urine Negative    Narrative:      Cutoff For Drugs Screened:    Amphetamines               500 ng/ml  Barbiturates               200 ng/ml  Benzodiazepines            150 ng/ml  Cocaine                    150 ng/ml  Methadone                  200 ng/ml  Opiates                    100 ng/ml  Phencyclidine               25 ng/ml  THC                            50 ng/ml  Methamphetamine            500 ng/ml  Tricyclic Antidepressants  300 ng/ml  Oxycodone                  100 ng/ml  Propoxyphene               300 ng/ml  Buprenorphine               10 ng/ml    The normal value for all drugs tested is negative. This report includes unconfirmed screening results, with the cutoff values listed, to be used for medical treatment purposes only.  Unconfirmed results must not be used for non-medical purposes such as employment or legal testing.  Clinical consideration should be applied to any drug of abuse test, particularly when unconfirmed results are used.      Urinalysis With Microscopic If Indicated (No Culture) - Urine, Clean Catch [[id number]]  (Abnormal) Collected: 01/03/21 2015    Specimen: Urine, Clean Catch Updated: 01/03/21 2031     Color, UA Yellow     Appearance, UA Clear     pH, UA 6.0     Specific  Gravity, UA 1.034     Comment: Result obtained by Refractometer        Glucose,  mg/dL (1+)     Ketones, UA 40 mg/dL (2+)     Bilirubin, UA Small (1+)     Blood, UA Trace     Protein, UA >=300 mg/dL (3+)     Leuk Esterase, UA Negative     Nitrite, UA Negative     Urobilinogen, UA 0.2 E.U./dL    CBC & Differential [[id number]]  (Abnormal) Collected: 01/03/21 2011    Specimen: Blood from Arm, Left Updated: 01/03/21 2023    Narrative:      The following orders were created for panel order CBC & Differential.  Procedure                               Abnormality         Status                     ---------                               -----------         ------                     CBC Auto Differential[[id number]]        Abnormal            Final result                 Please view results for these tests on the individual orders.    CBC Auto Differential [[id number]]  (Abnormal) Collected: 01/03/21 2011    Specimen: Blood from Arm, Left Updated: 01/03/21 2023     WBC 12.14 10*3/mm3      RBC 4.67 10*6/mm3      Hemoglobin 14.6 g/dL      Hematocrit 41.5 %      MCV 88.9 fL      MCH 31.3 pg      MCHC 35.2 g/dL      RDW 13.5 %      RDW-SD 44.3 fl      MPV 9.6 fL      Platelets 358 10*3/mm3      Neutrophil % 77.8 %      Lymphocyte % 14.7 %      Monocyte % 6.8 %      Eosinophil % 0.0 %      Basophil % 0.3 %      Immature Grans % 0.4 %      Neutrophils, Absolute 9.44 10*3/mm3      Lymphocytes, Absolute 1.78 10*3/mm3      Monocytes, Absolute 0.83 10*3/mm3      Eosinophils, Absolute 0.00 10*3/mm3      Basophils, Absolute 0.04 10*3/mm3      Immature Grans, Absolute 0.05 10*3/mm3      nRBC 0.0 /100 WBC         Imaging Results (Last 24 Hours)     ** No results found for the last 24 hours. **            Assessment:    Active Hospital Problems    Diagnosis   • Alcoholic ketoacidosis             Plan:  Acute alcohol withdrawal  Alcoholism in history  Hypomagnesemia  Malignant hypertension  Ketoacidosis  Intractable nausea and  vomiting  Diabetes with hyperglycemia  Proteinuria    We will make n.p.o.  Placed on alcohol withdrawal protocol  Electrolyte replacement protocol  Several rounds of IV blood pressure medicine has been given we will continue as needed  Fluids  Check lactic acid  Repeat labs to check for anion gap which is 21  Patient has been evaluated by psychiatric services  As needed antiemetics will be given  We will monitor the blood sugars if continues to increase or anion gap increases will place on an insulin drip  Protein noted in the urine we will give aggressive fluids.  May need to have work-up as an outpatient.  Secondary  to her diabetes    Protonix for GI prophylaxis    SCDs for DVT prophylaxis in light of alcohol history    Full code    I discussed the patient's findings and my recommendations with: *Patient and fiancé at the bedside    Drew Dawkins MD                  Electronically signed by Drew Dawkins MD at 01/04/21 0224         Current Facility-Administered Medications   Medication Dose Route Frequency Provider Last Rate Last Admin   • albuterol sulfate HFA (PROVENTIL HFA;VENTOLIN HFA;PROAIR HFA) inhaler 2 puff  2 puff Inhalation Q6H PRN Danial Foster MD       • bisacodyl (DULCOLAX) EC tablet 5 mg  5 mg Oral Daily PRN Drew Dawkins MD       • dextrose (D50W) 25 g/ 50mL Intravenous Solution 25 g  25 g Intravenous Q15 Min PRN Drew Dawkins MD       • dextrose (GLUTOSE) oral gel 15 g  15 g Oral Q15 Min PRN Drew Dawkins MD       • escitalopram (LEXAPRO) tablet 20 mg  20 mg Oral Daily Danial Foster MD   20 mg at 01/05/21 0851   • glucagon (human recombinant) (GLUCAGEN DIAGNOSTIC) injection 1 mg  1 mg Subcutaneous Q15 Min PRN Drew Dawkins MD       • hydrALAZINE (APRESOLINE) injection 10 mg  10 mg Intravenous Q4H PRN Drew Dawkins MD   10 mg at  01/05/21 0918   • insulin aspart (novoLOG) injection 0-7 Units  0-7 Units Subcutaneous TID AC Drew Dawkins MD   2 Units at 01/04/21 1638   • insulin detemir (LEVEMIR) injection 10 Units  10 Units Subcutaneous Nightly Danial Foster MD   10 Units at 01/04/21 2001   • LORazepam (ATIVAN) injection 1 mg  1 mg Intravenous Q8H Derw Dawkins MD   1 mg at 01/05/21 0216   • LORazepam (ATIVAN) tablet 1 mg  1 mg Oral Q2H PRN Drew Dawkins MD        Or   • LORazepam (ATIVAN) injection 1 mg  1 mg Intravenous Q2H PRN Drew Dawkins MD        Or   • LORazepam (ATIVAN) tablet 2 mg  2 mg Oral Q1H PRN Drew Dawkins MD        Or   • LORazepam (ATIVAN) injection 2 mg  2 mg Intravenous Q1H PRN Drew Dawkins MD        Or   • LORazepam (ATIVAN) injection 2 mg  2 mg Intravenous Q15 Min PRN Drew Dawkins MD   2 mg at 01/04/21 0517    Or   • LORazepam (ATIVAN) injection 2 mg  2 mg Intramuscular Q15 Min PRN Drew Dawkins MD        Or   • LORazepam (ATIVAN) tablet 4 mg  4 mg Oral Q1H PRN Drew Dawkins MD        Or   • LORazepam (ATIVAN) injection 4 mg  4 mg Intravenous Q1H PRN Drew Dawkins MD       • LORazepam (ATIVAN) injection 2 mg  2 mg Intravenous Arnaldo Contreras APRN       • losartan (COZAAR) tablet 75 mg  75 mg Oral Q24H Danial Foster MD   75 mg at 01/05/21 0850   • magnesium hydroxide (MILK OF MAGNESIA) suspension 2400 mg/10mL 10 mL  10 mL Oral Daily PRN Drew Dawkins MD       • metoprolol tartrate (LOPRESSOR) injection 5 mg  5 mg Intravenous Q6H PRN Drew Dawkins MD   5 mg at 01/04/21 1647   • metoprolol tartrate (LOPRESSOR) tablet 25 mg  25 mg Oral Q12H Danial Foster MD   25 mg at 01/05/21 0851   • ondansetron  (ZOFRAN) tablet 4 mg  4 mg Oral Q6H PRN Drew Dawkins MD        Or   • ondansetron (ZOFRAN) injection 4 mg  4 mg Intravenous Q6H PRN Drew Dawkins MD   4 mg at 01/04/21 2000   • pantoprazole (PROTONIX) EC tablet 40 mg  40 mg Oral QADanial Mane MD   40 mg at 01/05/21 0619   • potassium chloride (MICRO-K) CR capsule 40 mEq  40 mEq Oral PRN Drew Dawkins MD   40 mEq at 01/04/21 0934    Or   • potassium chloride (KLOR-CON) packet 40 mEq  40 mEq Oral PRN Drew Dawkins MD        Or   • potassium chloride 10 mEq in 100 mL IVPB  10 mEq Intravenous Q1H PRN Drew Dawkins MD       • sodium chloride 0.9 % flush 10 mL  10 mL Intravenous PRN Arnaldo Castillo, APRN       • sodium chloride 0.9 % flush 10 mL  10 mL Intravenous PRN Arnaldo Castillo, APRN       • sodium chloride 0.9 % flush 10 mL  10 mL Intravenous Q12H Drew Dawkins MD   10 mL at 01/05/21 0851   • sodium chloride 0.9 % flush 10 mL  10 mL Intravenous PRN Drew Dawkins MD       • sodium chloride 0.9 % infusion  100 mL/hr Intravenous Continuous Danial Foster  mL/hr at 01/05/21 0610 100 mL/hr at 01/05/21 0610   • thiamine (B-1) 100 mg, folic acid 1 mg in sodium chloride 0.9 % 1,000 mL infusion  100 mL/hr Intravenous Daily Drew Dawkins  mL/hr at 01/05/21 0830 100 mL/hr at 01/05/21 0830   • traZODone (DESYREL) tablet 50 mg  50 mg Oral Nightly PRN Danial Foster MD            Physician Progress Notes (last 48 hours) (Notes from 01/03/21 0934 through 01/05/21 0934)      Danial Foster MD at 01/04/21 1034              [facility] Medicine Services  INPATIENT PROGRESS NOTE    Length of Stay: 0  Date of Admission: 1/3/2021  Primary Care Physician: Provider, No Known    Subjective   Chief Complaint: No new  complaints.    HPI: Patient is seen for follow-up.  She is 40-year-old female with history of alcoholism, depressive disorder, nicotine dependence, hypothyroidism, diabetes mellitus who was admitted for alcohol withdrawal with alcohol-related ketoacidosis and accelerated hypertension.  She is doing better, slightly deconditioned, eager to start eating and voices no new complaints.    Review of Systems   Constitutional: Positive for activity change and fatigue. Negative for appetite change, chills, diaphoresis and fever.   HENT: Negative for trouble swallowing and voice change.    Eyes: Negative for photophobia and visual disturbance.   Respiratory: Negative for cough, choking, chest tightness, shortness of breath, wheezing and stridor.    Cardiovascular: Negative for chest pain, palpitations and leg swelling.   Gastrointestinal: Negative for abdominal distention, abdominal pain, blood in stool, constipation, diarrhea, nausea and vomiting.   Endocrine: Negative for cold intolerance, heat intolerance, polydipsia, polyphagia and polyuria.   Genitourinary: Negative for decreased urine volume, difficulty urinating, dysuria, enuresis, flank pain, frequency, hematuria and urgency.   Musculoskeletal: Negative for arthralgias, gait problem, myalgias, neck pain and neck stiffness.   Skin: Negative for pallor, rash and wound.   Neurological: Negative for dizziness, tremors, seizures, syncope, facial asymmetry, speech difficulty, weakness, light-headedness, numbness and headaches.   Hematological: Does not bruise/bleed easily.   Psychiatric/Behavioral: Negative for agitation, behavioral problems and confusion.       Objective    Temp:  [97.1 °F (36.2 °C)-97.9 °F (36.6 °C)] 97.6 °F (36.4 °C)  Heart Rate:  [] 80  Resp:  [18-22] 18  BP: (158-214)/() 174/96    Physical Exam  Vitals signs and nursing note reviewed.   Constitutional:       General: She is not in acute distress.     Appearance: She is well-developed. She  is obese. She is not diaphoretic.   HENT:      Head: Normocephalic and atraumatic.      Right Ear: External ear normal.      Left Ear: External ear normal.      Nose: Nose normal.   Eyes:      Conjunctiva/sclera: Conjunctivae normal.      Pupils: Pupils are equal, round, and reactive to light.   Neck:      Musculoskeletal: Normal range of motion and neck supple.      Thyroid: No thyromegaly.      Vascular: No JVD.   Cardiovascular:      Rate and Rhythm: Normal rate and regular rhythm.      Heart sounds: Normal heart sounds. No murmur. No friction rub. No gallop.    Pulmonary:      Effort: Pulmonary effort is normal. No respiratory distress.      Breath sounds: Normal breath sounds. No stridor. No wheezing or rales.   Chest:      Chest wall: No tenderness.   Abdominal:      General: Bowel sounds are normal. There is no distension.      Palpations: Abdomen is soft. There is no mass.      Tenderness: There is no abdominal tenderness. There is no guarding or rebound.      Hernia: No hernia is present.   Musculoskeletal: Normal range of motion.         General: No tenderness or deformity.   Skin:     General: Skin is warm and dry.      Coloration: Skin is not pale.      Findings: No erythema or rash.   Neurological:      General: No focal deficit present.      Mental Status: She is alert and oriented to person, place, and time.      Cranial Nerves: No cranial nerve deficit.      Sensory: No sensory deficit.      Motor: Tremor present. No weakness.      Coordination: Coordination normal.      Deep Tendon Reflexes: Reflexes are normal and symmetric. Reflexes normal.   Psychiatric:         Mood and Affect: Mood normal.         Behavior: Behavior normal. Behavior is cooperative.         Thought Content: Thought content normal.         Judgment: Judgment normal.           Medication Review:    Current Facility-Administered Medications:   •  albuterol sulfate HFA (PROVENTIL HFA;VENTOLIN HFA;PROAIR HFA) inhaler 2 puff, 2 puff,  Inhalation, Q6H PRN, Danial Foster MD  •  bisacodyl (DULCOLAX) EC tablet 5 mg, 5 mg, Oral, Daily PRN, Drew Dawkins MD  •  dextrose (D50W) 25 g/ 50mL Intravenous Solution 25 g, 25 g, Intravenous, Q15 Min PRN, Drew Dawkins MD  •  dextrose (GLUTOSE) oral gel 15 g, 15 g, Oral, Q15 Min PRN, Drew Dawkins MD  •  dextrose 5 % and sodium chloride 0.9 % with KCl 20 mEq/L infusion, 125 mL/hr, Intravenous, Continuous, Malcolm Chandra MD, Last Rate: 125 mL/hr at 01/04/21 0750, 125 mL/hr at 01/04/21 0750  •  escitalopram (LEXAPRO) tablet 20 mg, 20 mg, Oral, Daily, Danial Foster MD, 20 mg at 01/04/21 0935  •  glucagon (human recombinant) (GLUCAGEN DIAGNOSTIC) injection 1 mg, 1 mg, Subcutaneous, Q15 Min PRN, Drew Dawkins MD  •  hydrALAZINE (APRESOLINE) injection 10 mg, 10 mg, Intravenous, Q4H PRN, Drew Dawkins MD  •  insulin aspart (novoLOG) injection 0-7 Units, 0-7 Units, Subcutaneous, TID AC, Drew Dawkins MD, 2 Units at 01/04/21 0918  •  insulin detemir (LEVEMIR) injection 10 Units, 10 Units, Subcutaneous, Nightly, Danial Foster MD  •  LORazepam (ATIVAN) injection 1 mg, 1 mg, Intravenous, Q6H, 1 mg at 01/04/21 0923 **FOLLOWED BY** [START ON 1/5/2021] LORazepam (ATIVAN) injection 1 mg, 1 mg, Intravenous, Q8H, Drew Dawkins MD  •  LORazepam (ATIVAN) tablet 1 mg, 1 mg, Oral, Q2H PRN **OR** LORazepam (ATIVAN) injection 1 mg, 1 mg, Intravenous, Q2H PRN **OR** LORazepam (ATIVAN) tablet 2 mg, 2 mg, Oral, Q1H PRN **OR** LORazepam (ATIVAN) injection 2 mg, 2 mg, Intravenous, Q1H PRN **OR** LORazepam (ATIVAN) injection 2 mg, 2 mg, Intravenous, Q15 Min PRN, 2 mg at 01/04/21 0517 **OR** LORazepam (ATIVAN) injection 2 mg, 2 mg, Intramuscular, Q15 Min PRN **OR** LORazepam (ATIVAN) tablet 4 mg, 4 mg, Oral, Q1H PRN **OR** LORazepam (ATIVAN) injection  4 mg, 4 mg, Intravenous, Q1H PRN, Drew Dawkins MD  •  LORazepam (ATIVAN) injection 2 mg, 2 mg, Intravenous, Once, Arnaldo Castillo, APRN  •  magnesium hydroxide (MILK OF MAGNESIA) suspension 2400 mg/10mL 10 mL, 10 mL, Oral, Daily PRN, Drew Dawkins MD  •  metoprolol tartrate (LOPRESSOR) injection 5 mg, 5 mg, Intravenous, Q6H PRN, Drew Dawkins MD  •  ondansetron (ZOFRAN) tablet 4 mg, 4 mg, Oral, Q6H PRN **OR** ondansetron (ZOFRAN) injection 4 mg, 4 mg, Intravenous, Q6H PRN, Drew Dawkins MD  •  pantoprazole (PROTONIX) EC tablet 40 mg, 40 mg, Oral, Kristin LUCAS Anthony W, MD, 40 mg at 01/04/21 0935  •  potassium chloride (MICRO-K) CR capsule 40 mEq, 40 mEq, Oral, PRN, 40 mEq at 01/04/21 0934 **OR** potassium chloride (KLOR-CON) packet 40 mEq, 40 mEq, Oral, PRN **OR** potassium chloride 10 mEq in 100 mL IVPB, 10 mEq, Intravenous, Q1H PRN, Drew Dawkins MD  •  [COMPLETED] Insert peripheral IV, , , Once **AND** sodium chloride 0.9 % flush 10 mL, 10 mL, Intravenous, PRN, Arnaldo Castillo, APRN  •  [COMPLETED] Insert peripheral IV, , , Once **AND** sodium chloride 0.9 % flush 10 mL, 10 mL, Intravenous, PRN, Arnaldo Castillo, APRN  •  sodium chloride 0.9 % flush 10 mL, 10 mL, Intravenous, Q12H, Drew Dawkins MD, 10 mL at 01/04/21 0918  •  sodium chloride 0.9 % flush 10 mL, 10 mL, Intravenous, PRN, Drew Dawkins MD  •  thiamine (B-1) 100 mg, folic acid 1 mg in sodium chloride 0.9 % 1,000 mL infusion, 100 mL/hr, Intravenous, Daily, Drew Dawkins MD, Last Rate: 100 mL/hr at 01/04/21 0918, 100 mL/hr at 01/04/21 0918  •  traZODone (DESYREL) tablet 50 mg, 50 mg, Oral, Nightly PRN, Danial Foster MD    Results Review:  I have reviewed the labs, radiology results, and diagnostic studies.    Laboratory Data:   Results from last  7 days   Lab Units 01/04/21 0327 01/03/21 2011   SODIUM mmol/L 133* 135*   POTASSIUM mmol/L 3.3* 3.4*   CHLORIDE mmol/L 96* 92*   CO2 mmol/L 27.0 22.0   BUN mg/dL 10 15   CREATININE mg/dL 0.69 0.91   GLUCOSE mg/dL 194* 232*   CALCIUM mg/dL 8.7 9.6   BILIRUBIN mg/dL 0.9 1.0   ALK PHOS U/L 72 91   ALT (SGPT) U/L 19 27   AST (SGOT) U/L 22 27   ANION GAP mmol/L 10.0 21.0*     Estimated Creatinine Clearance: 118.4 mL/min (by C-G formula based on SCr of 0.69 mg/dL).  Results from last 7 days   Lab Units 01/04/21 0327 01/03/21 2011   MAGNESIUM mg/dL 1.7 1.1*   PHOSPHORUS mg/dL 2.8  --          Results from last 7 days   Lab Units 01/04/21  0327 01/03/21 2011   WBC 10*3/mm3 7.76 12.14*   HEMOGLOBIN g/dL 12.4 14.6   HEMATOCRIT % 35.5 41.5   PLATELETS 10*3/mm3 243 358           Culture Data:   No results found for: BLOODCX  No results found for: URINECX  No results found for: RESPCX  No results found for: WOUNDCX  No results found for: STOOLCX  No components found for: BODYFLD    Radiology Data:   Imaging Results (Last 24 Hours)     ** No results found for the last 24 hours. **          I have reviewed the patient's current medications.     Assessment/Plan     Hospital Problem List:  Active Problems:    Alcoholic ketoacidosis    History of alcoholism with alcohol withdrawal: Continue CIWA protocol with prophylactic thiamine.  Reactive acidosis has resolved.    Hypokalemia: Replete potassium and monitor levels.    Hyponatremia (likely hypovolemic): Continue isotonic saline, restrict free water and monitor BMP.    Hypertension: Patient was unmedicated prior to admission.  Begin Cozaar with adjustments as needed for optimal blood pressure control.      Diabetes mellitus: Resume basal insulin with Accu-Cheks and sliding scale insulin.  Hold Metformin for now.    Nicotine dependence: Nicotine patch was offered to the patient but she declined.  Nicotine cessation counseling has been discussed the patient.    Depressive  disorder: Continue home medications.    Deconditioning: Consult PT and OT.    Continue GI and DVT prophylaxis.    Discharge Planning: In progress.    Danial Foster MD   01/04/21   10:34 CST          Electronically signed by Danial Foster MD at 01/04/21 1713     Broderick Wilder II, MD at 01/03/21 2212        PSYCH ED NOTE     Notified of pt in ED.  Have reviewed tech, nursing & provider documentation, as available at time of note.      Briefly, 39 y/o F with EtOH use d/o.  Pt presents with c/o nausea w/ detox sx.  Wanting rehab resources.  Denies SI/HI.       O -  Denies SI/HI.  UDS negative.      A&P)  --EtOH Use D/o, unspecified severity     --> Does not meet admission criteria given strict substance rehab concerns.  She is wanting outpt resources.  [facility] staff to provide.     --> At time of review, patient adamantly denies any thoughts of death or dying.  The patient also adamantly denies any suicidal ideations, intentions or plans.  Denies any homicidal ideations, intentions or plans.  Denies any auditory visual hallucinations.  Denies paranoia.  Not grossly psychotic.  The patient does not constitute an imminent risk of harm to self or others, at time of the interview.  Therefore, patient does not meet commitment criteria at this time.     --> Discussed with U Staff, ED staff, ED Physician, Dr. Chandra.      --> If any question or changes, kindly contact me.        Broderick Wilder II, MD  01/03/21 @ 22:13 CST          Electronically signed by Broderick Wilder II, MD at 01/03/21 8481

## 2021-01-05 NOTE — PLAN OF CARE
Goal Outcome Evaluation:  Plan of Care Reviewed With: patient  Progress: no change  Outcome Summary: vss. new admit and resting between care. will cont to monitor.

## 2021-01-05 NOTE — PLAN OF CARE
Goal Outcome Evaluation:  Plan of Care Reviewed With: patient  Progress: no change  Outcome Summary: initial OT evaluation complete. co-eval with PT. pt was pleasant and cooperative throughout, but is really wanting to go back home. sititng up in bed with breakfast in front of her when therapy arrived. completed all transfers, functional mobility, and ADLs independently. no LOB noted with transfers or mobility. BP elevated at end of session. RN aware. no goals established. no further skilled OT services recommended at this time. OT orders will be discharged.

## 2021-01-05 NOTE — CONSULTS
"Adult Nutrition  Assessment    Patient Name:  Julia Gibson  YOB: 1980  MRN: [id number]  Admit Date:  1/3/2021    Assessment Date:  1/5/2021    Comments:  Pt admitted for Alcohol Withdrawal and Alcohol Ketoacidosis.  She is on CIWA protocol.  Admits to a decreased appetite but denies any wt loss and based on wt hx her wt appears relatively stable.  She agrees to Boost GC.  Will send with all meals and monitor her intake.  She may need DM education when she is feeling better.  Will monitor.     Reason for Assessment     Row Name 01/05/21 1145          Reason for Assessment    Reason For Assessment  nurse/nurse practitioner consult     Diagnosis  substance use/abuse     Identified At Risk by Screening Criteria  other (see comments) Alcohol withdrawal         Nutrition/Diet History     Row Name 01/05/21 1145          Nutrition/Diet History    Typical Food/Fluid Intake  Pt reports that her appetite has been depressed but she hasn't lost any wt.  \"I am just not very hungry right now\" She agrees to try the Boost.           Labs/Tests/Procedures/Meds     Row Name 01/05/21 1147          Labs/Procedures/Meds    Lab Results Reviewed  reviewed, pertinent     Lab Results Comments  Na 134; Gluc 248/178/139        Diagnostic Tests/Procedures    Diagnostic Test/Procedure Reviewed  reviewed, pertinent     Diagnostic Test/Procedures Comments  CIWA prototol; Glucose monitoring        Medications    Pertinent Medications Reviewed  reviewed, pertinent     Pertinent Medications Comments  SSI; Levemir; CIWA protocol; Lopressor; Thiamine         Physical Findings     Row Name 01/05/21 1150          Physical Findings    Overall Physical Appearance  on oxygen therapy         Estimated/Assessed Needs     Row Name 01/05/21 1150          Calculation Measurements    Weight Used For Calculations  85.3 kg (188 lb)        Estimated/Assessed Needs    Additional Documentation  Additional Requirements (Group);[place] " Equation (Group);Fluid Requirements (Group);Protein Requirements (Group);Calorie Requirements (Group);KCAL/KG (Group)        Calorie Requirements    Estimated Calorie Need Method  [facility] Equation    RMR ([facility] Equation)  1529.51     [facility] Activity Factors  1.2     Activity Factors ([facility])  1835.412        Protein Requirements    Weight Used For Protein Calculations  59 kg (130 lb)     Est Protein Requirement Amount (gms/kg)  1.2 gm protein     Estimated Protein Requirements (gms/day)  70.76        Fluid Requirements    Fluid Requirements (mL/day)  1850     Estimated Fluid Requirement Method  RDA Method     RDA Method (mL)  1850         Nutrition Prescription Ordered     Row Name 01/05/21 1151          Nutrition Prescription PO    Current PO Diet  Regular     Fluid Consistency  Thin     Common Modifiers  Cardiac;Consistent Carbohydrate         Evaluation of Received Nutrient/Fluid Intake     Row Name 01/05/21 1151          PO Evaluation    Number of Meals  1     % PO Intake  25% this am               Electronically signed by:  Bridgett De La Torre RD  01/05/21 11:59 CST

## 2021-01-05 NOTE — PLAN OF CARE
Goal Outcome Evaluation:  Plan of Care Reviewed With: caregiver, patient  Progress: no change  Outcome Summary: New Assessment:  Pt admitted for alcohol withdrawal with alcohol Ketoacidosis.  SHe is on CIWA protocol. Will add Boost GC and monitor.

## 2021-01-05 NOTE — THERAPY DISCHARGE NOTE
Patient Name: Julia Gibson  : 1980    MRN: [id number]                              Today's Date: 2021     Initial Evaluation/Discharge  Admit Date: 1/3/2021    Visit Dx:     ICD-10-CM ICD-9-CM   1. Alcoholic ketoacidosis  E87.2 276.2   2. Alcohol withdrawal syndrome with complication (CMS/[facility])  F10.239 291.81   3. Panic attack  F41.0 300.01   4. Hypomagnesemia  E83.42 275.2   5. Hypertensive urgency  I16.0 401.9   6. Impaired mobility and ADLs  Z74.09 V49.89    Z78.9    7. Impaired functional mobility, balance, gait, and endurance  Z74.09 V49.89     Patient Active Problem List   Diagnosis   • Anxiety   • Severe episode of recurrent major depressive disorder, without psychotic features (CMS/[facility])   • Alcohol use disorder, severe, dependence (CMS/[facility])   • Suicidal ideations   • GERD (gastroesophageal reflux disease)   • Insomnia   • Hypertension   • Tobacco dependence syndrome   • Diabetes mellitus (CMS/[facility])   • Hematuria   • Metformin overdose, initial encounter   • Lactic acidosis   • Alcoholic ketoacidosis     Past Medical History:   Diagnosis Date   • Acute bronchitis    • Acute conjunctivitis     left   • Acute pharyngitis    • Alcohol abuse    • Allergic rhinitis due to pollen    • Anxiety state    • Asthma    • Constipation    • Depressive disorder    • Diarrhea    • GERD (gastroesophageal reflux disease)    • Hypertensive disorder    • Insomnia    • Knee pain    • Migraine    • Nausea    • Nausea and vomiting    • Psychiatric illness    • Rib pain    • Tobacco dependence syndrome    • Type 2 diabetes mellitus (CMS/[facility])     uncontrolled   • URI (upper respiratory infection)    • Viral intestinal infection, unspecified    • Wheezing      Past Surgical History:   Procedure Laterality Date   • ABDOMINAL SURGERY     •  SECTION     • INJECTION OF MEDICATION  2016    Kenalog   • INJECTION OF MEDICATION  2015    Toradol   • PAP SMEAR  2010   • TUBAL ABDOMINAL LIGATION        General Information     Row Name 01/05/21 0831          Physical Therapy Time and Intention    Document Type  evaluation;discharge evaluation/summary  -KW     Mode of Treatment  co-treatment;occupational therapy;physical therapy  -KW     Row Name 01/05/21 0831          General Information    Patient Profile Reviewed  yes  -KW     Prior Level of Function  independent:;all household mobility;community mobility;ADL's;driving;work  -KW     Existing Precautions/Restrictions  no known precautions/restrictions  -KW     Barriers to Rehab  none identified  -KW     Row Name 01/05/21 0831          Living Environment    Lives With  significant other  -KW     Row Name 01/05/21 0831          Home Main Entrance    Number of Stairs, Main Entrance  other (see comments) 15; 2nd story apt  -KW     Row Name 01/05/21 0831          Stairs Within Home, Primary    Stairs, Within Home, Primary  no AD/DME at home; has tub/shower  -KW     Number of Stairs, Within Home, Primary  none  -KW     Row Name 01/05/21 0831          Cognition    Orientation Status (Cognition)  oriented x 4  -KW     Row Name 01/05/21 0831          Safety Issues, Functional Mobility    Safety Issues Affecting Function (Mobility)  judgment  -KW       User Key  (r) = Recorded By, (t) = Taken By, (c) = Cosigned By    Initials Name Provider Type    KW Anton Ordaz PT Physical Therapist        Mobility     Row Name 01/05/21 0831          Bed Mobility    Bed Mobility  supine-sit;sit-supine  -KW     Supine-Sit [place] (Bed Mobility)  independent  -KW     Sit-Supine [place] (Bed Mobility)  independent  -KW     Assistive Device (Bed Mobility)  head of bed elevated  -KW     Row Name 01/05/21 0831          Transfers    Comment (Transfers)  Able to complete sit<>stand transfer without use of BUEs  -KW     Row Name 01/05/21 0831          Sit-Stand Transfer    Sit-Stand [place] (Transfers)  independent  -KW     Assistive Device (Sit-Stand Transfers)  other (see  "comments) no AD  -KW     Row Name 01/05/21 0831          Gait/Stairs (Locomotion)    [place] Level (Gait)  independent  -KW     Assistive Device (Gait)  other (see comments) no AD  -KW     Distance in Feet (Gait)  50'  -KW     Deviations/Abnormal Patterns (Gait)  base of support, wide  -KW     Comment (Gait/Stairs)  Pt initially ambulated with wide TWILA, when PT inquired about pt's gait pattern, she stated her \"feet were itching.\" PT cued pt to ambulate as normally as possible, gait pattern and speed much improved at that time, no instability or LOB noted.  -KW       User Key  (r) = Recorded By, (t) = Taken By, (c) = Cosigned By    Initials Name Provider Type    KW Anton Ordaz PT Physical Therapist        Obj/Interventions     Row Name 01/05/21 0831          Range of Motion Comprehensive    General Range of Motion  no range of motion deficits identified  -KW     Row Name 01/05/21 0831          Strength Comprehensive (MMT)    General Manual Muscle Testing (MMT) Assessment  no strength deficits identified  -KW     Comment, General Manual Muscle Testing (MMT) Assessment  Grossly 4+/5, required encouragement to fully resist  -KW     Row Name 01/05/21 0831          Balance    Balance Assessment  sitting static balance;sitting dynamic balance;standing static balance;standing dynamic balance  -KW     Static Sitting Balance  WFL  -KW     Dynamic Sitting Balance  WFL  -KW     Static Standing Balance  WFL  -KW     Dynamic Standing Balance  WFL  -KW     Row Name 01/05/21 0831          Sensory Assessment (Somatosensory)    Sensory Assessment (Somatosensory)  sensation intact  -KW       User Key  (r) = Recorded By, (t) = Taken By, (c) = Cosigned By    Initials Name Provider Type    KW Anton Ordaz PT Physical Therapist        Goals/Plan    No documentation.       Clinical Impression     Row Name 01/05/21 0831          Pain    Additional Documentation  Pain Scale: Numbers Pre/Post-Treatment (Group)  -KW     Row Name " 01/05/21 0831          Pain Scale: Numbers Pre/Post-Treatment    Pretreatment Pain Rating  0/10 - no pain  -KW     Posttreatment Pain Rating  0/10 - no pain  -KW     Pain Intervention(s)  Ambulation/increased activity;Repositioned  -KW     Row Name 01/05/21 0831          Plan of Care Review    Plan of Care Reviewed With  patient  -KW     Row Name 01/05/21 0831          Therapy Assessment/Plan (PT)    Criteria for Skilled Interventions Met (PT)  no  -KW     Row Name 01/05/21 0831          Vital Signs    Pre Systolic BP Rehab  155  -KW     Pre Treatment Diastolic BP  97  -KW     Post Systolic BP Rehab  168  -KW     Post Treatment Diastolic BP  103 RN aware  -KW     Pretreatment Heart Rate (beats/min)  89  -KW     Posttreatment Heart Rate (beats/min)  93  -KW     Pre SpO2 (%)  99  -KW     O2 Delivery Pre Treatment  room air  -KW     Post SpO2 (%)  99  -KW     O2 Delivery Post Treatment  room air  -KW     Pre Patient Position  Sitting  -KW     Post Patient Position  Sitting  -KW     Row Name 01/05/21 0831          Positioning and Restraints    Pre-Treatment Position  in bed  -KW     Post Treatment Position  bed  -KW     In Bed  notified nsg;supine;call light within reach;encouraged to call for assist  -KW       User Key  (r) = Recorded By, (t) = Taken By, (c) = Cosigned By    Initials Name Provider Type    Anton Randle, MILKA Physical Therapist        Outcome Measures     Row Name 01/05/21 0831          How much help from another person do you currently need...    Turning from your back to your side while in flat bed without using bedrails?  4  -KW     Moving from lying on back to sitting on the side of a flat bed without bedrails?  4  -KW     Moving to and from a bed to a chair (including a wheelchair)?  4  -KW     Standing up from a chair using your arms (e.g., wheelchair, bedside chair)?  4  -KW     Climbing 3-5 steps with a railing?  4  -KW     To walk in hospital room?  4  -KW     AM-PAC 6 Clicks Score (PT)  24   "-KW     Row Name 01/05/21 0831          Tinetti Assessment    Sitting Balance  1  -KW     Arises  2  -KW     Attempts to Rise  2  -KW     Immediate Standing Balance (first 5 sec)  2  -KW     Standing Balance  1  -KW     Sternal Nudge (feet close together)  2  -KW     Eyes Closed (feet close together)  1  -KW     Turning 360 Degrees- Steps  1  -KW     Turning 360 Degrees- Steadiness  1  -KW     Sitting Down  2  -KW     [place] Balance Score  15  -KW     Gait Initiation (immediate after told \"go\")  1  -KW     Step Length- Right Swing  1  -KW     Step Length- Left Swing  1  -KW     Foot Clearance- Right Foot  1  -KW     Foot Clearance- Left Foot  1  -KW     Step Symmetry  1  -KW     Step Continuity  1  -KW     Path (excursion)  2  -KW     Trunk  2  -KW     Base of Support  0  -KW     Gait Score  11  -KW     [place] Total Score  26  -KW     [place] Assistive Device  other (see comments) no AD  -KW     Tinetti Assessment Comments  26/28 = low fall risk  -KW     Row Name 01/05/21 0831          Functional Assessment    Outcome Measure Options  AM-PAC 6 Clicks Basic Mobility (PT);T[place]etti  -KW       User Key  (r) = Recorded By, (t) = Taken By, (c) = Cosigned By    Initials Name Provider Type    KW Anton Ordaz PT Physical Therapist        Physical Therapy Education                 Title: PT OT SLP Therapies (In Progress)     Topic: Physical Therapy (In Progress)     Point: Mobility training (Done)     Learning Progress Summary           Patient Acceptance, E, VU by RAMIRO at 1/5/2021 1208    Comment: D/c from PT                   Point: Home exercise program (Not Started)     Learner Progress:  Not documented in this visit.          Point: Body mechanics (Not Started)     Learner Progress:  Not documented in this visit.          Point: Precautions (Not Started)     Learner Progress:  Not documented in this visit.                      User Key     Initials Effective Dates Name Provider Type Discipline    KW 08/09/20 -  Orlin" "MILKA Walton Physical Therapist PT              PT Recommendation and Plan     Plan of Care Reviewed With: patient  Outcome Summary: PT eval complete, co-eval with OT. Pt pleasant and agreeable to PT/OT. Cooperative with evaluation but required encouragement to provide max effort. Pt demonstrated independence with all functional mobility and transfers. BLE MMT/ROM WFL. PT ambulated 50' in room with no AD, Pt initially ambulated with wide TWILA, when PT inquired about pt's gait pattern, she stated her \"feet were itching.\" PT cued pt to ambulate as normally as possible, gait pattern and speed much improved at that time, no instability or LOB noted. Tinetti 26/28 = low fall risk. BP elevated post eval, RN aware. Pt is not appropriate for skilled PT at this time d/t independence with all functional mobility and baseline level of function. Pt and RN agreeable. Will d/c from PT at this time.     Time Calculation:   PT Charges     Row Name 01/05/21 1215             Time Calculation    Start Time  0830  -KW      Stop Time  0856  -KW      Time Calculation (min)  26 min  -KW      PT Received On  01/05/21  -KW        User Key  (r) = Recorded By, (t) = Taken By, (c) = Cosigned By    Initials Name Provider Type    Anton Randle PT Physical Therapist        Therapy Charges for Today     Code Description Service Date Service Provider Modifiers Qty    [id number]  PT EVAL LOW COMPLEXITY 2 1/5/2021 Anton Ordaz PT GP 1          PT G-Codes  Outcome Measure Options: AM-PAC 6 Clicks Basic Mobility (PT), Tinetti  AM-PAC 6 Clicks Score (PT): 24  AM-PAC 6 Clicks Score (OT): 24  Tinetti Total Score: 26    PT Discharge Summary  Anticipated Discharge Disposition (PT): home with assist    Anton Ordaz PT  1/5/2021       "

## 2021-01-05 NOTE — THERAPY DISCHARGE NOTE
Acute Care - Occupational Therapy Discharge  [facility]    Patient Name: Julia Gibson  : 1980    MRN: [id number]                              Today's Date: 2021       Admit Date: 1/3/2021    Visit Dx:     ICD-10-CM ICD-9-CM   1. Alcoholic ketoacidosis  E87.2 276.2   2. Alcohol withdrawal syndrome with complication (CMS/HCC)  F10.239 291.81   3. Panic attack  F41.0 300.01   4. Hypomagnesemia  E83.42 275.2   5. Hypertensive urgency  I16.0 401.9   6. Impaired mobility and ADLs  Z74.09 V49.89    Z78.9      Patient Active Problem List   Diagnosis   • Anxiety   • Severe episode of recurrent major depressive disorder, without psychotic features (CMS/[facility])   • Alcohol use disorder, severe, dependence (CMS/[facility])   • Suicidal ideations   • GERD (gastroesophageal reflux disease)   • Insomnia   • Hypertension   • Tobacco dependence syndrome   • Diabetes mellitus (CMS/[facility])   • Hematuria   • Metformin overdose, initial encounter   • Lactic acidosis   • Alcoholic ketoacidosis     Past Medical History:   Diagnosis Date   • Acute bronchitis    • Acute conjunctivitis     left   • Acute pharyngitis    • Alcohol abuse    • Allergic rhinitis due to pollen    • Anxiety state    • Asthma    • Constipation    • Depressive disorder    • Diarrhea    • GERD (gastroesophageal reflux disease)    • Hypertensive disorder    • Insomnia    • Knee pain    • Migraine    • Nausea    • Nausea and vomiting    • Psychiatric illness    • Rib pain    • Tobacco dependence syndrome    • Type 2 diabetes mellitus (CMS/HCC)     uncontrolled   • URI (upper respiratory infection)    • Viral intestinal infection, unspecified    • Wheezing      Past Surgical History:   Procedure Laterality Date   • ABDOMINAL SURGERY     •  SECTION     • INJECTION OF MEDICATION  2016    Kenalog   • INJECTION OF MEDICATION  2015    Toradol   • PAP SMEAR  2010   • TUBAL ABDOMINAL LIGATION       General Information     Row Name 21  0830          OT Time and Intention    Document Type  evaluation;discharge evaluation/summary  -ME     Mode of Treatment  co-treatment;occupational therapy;physical therapy  -ME     Row Name 01/05/21 0830          General Information    Patient Profile Reviewed  yes  -ME     Prior Level of Function  independent:;all household mobility;community mobility;ADL's;driving;work  -ME     Existing Precautions/Restrictions  no known precautions/restrictions  -ME     Barriers to Rehab  none identified  -ME     Row Name 01/05/21 0830          Living Environment    Lives With  significant other  -ME     Row Name 01/05/21 0830          Home Main Entrance    Number of Stairs, Main Entrance  other (see comments) 15 steps; second story apartment  -ME     Row Name 01/05/21 0830          Stairs Within Home, Primary    Stairs, Within Home, Primary  no AD at home; has tub shower  -ME     Number of Stairs, Within Home, Primary  none  -ME     Row Name 01/05/21 0830          Cognition    Orientation Status (Cognition)  oriented x 4  -ME     Row Name 01/05/21 0830          Safety Issues, Functional Mobility    Safety Issues Affecting Function (Mobility)  awareness of need for assistance;safety precaution awareness;safety precautions follow-through/compliance  -ME       User Key  (r) = Recorded By, (t) = Taken By, (c) = Cosigned By    Initials Name Provider Type    ME Mohsen Pearson OTR/L Occupational Therapist        Mobility/ADL's     Row Name 01/05/21 0830          Bed Mobility    Bed Mobility  supine-sit;sit-supine  -ME     Supine-Sit [place] (Bed Mobility)  independent  -ME     Sit-Supine [place] (Bed Mobility)  independent  -ME     Row Name 01/05/21 0830          Transfers    Transfers  sit-stand transfer;toilet transfer  -ME     Sit-Stand [place] (Transfers)  independent  -ME     [place] Level (Toilet Transfer)  independent  -ME     Assistive Device (Toilet Transfer)  commode  -ME     Row Name 01/05/21 0830           Sit-Stand Transfer    Assistive Device (Sit-Stand Transfers)  other (see comments) none  -ME     Row Name 01/05/21 0830          Toilet Transfer    Type (Toilet Transfer)  sit-stand;stand-sit  -ME     Row Name 01/05/21 0830          Functional Mobility    Functional Mobility- Ind. Level  independent  -ME     Functional Mobility- Device  other (see comments) none  -ME     Functional Mobility- Comment  when first completing ambulation pt was walking with very slow gait speed, was hitting bottom of left foot on floor when ambulating; whenasked about this, pt states that this is just because the bottom of her foot was itching very badly, and she was trying to make it stop; pt then ambulated again, and gait looked normal; no LOB noted  -ME     Row Name 01/05/21 0830          Activities of Daily Living    BADL Assessment/Intervention  toileting  -ME     Row Name 01/05/21 0830          Toileting Assessment/Training    [place] Level (Toileting)  adjust/manage clothing;perform perineal hygiene;independent  -ME     Position (Toileting)  supported sitting;unsupported standing  -ME       User Key  (r) = Recorded By, (t) = Taken By, (c) = Cosigned By    Initials Name Provider Type    ME Mohsen Pearson, OTR/L Occupational Therapist        Obj/Interventions     Row Name 01/05/21 0830          Sensory Assessment (Somatosensory)    Sensory Assessment (Somatosensory)  UE sensation intact  -ME     Row Name 01/05/21 0830          Vision Assessment/Intervention    Visual Impairment/Limitations  corrective lenses for reading  -ME     Row Name 01/05/21 0830          Range of Motion Comprehensive    General Range of Motion  no range of motion deficits identified;bilateral upper extremity ROM WFL  -ME     Row Name 01/05/21 0830          Strength Comprehensive (MMT)    General Manual Muscle Testing (MMT) Assessment  other (see comments)  -ME     Comment, General Manual Muscle Testing (MMT) Assessment  BUE strength and bilateral   strength are grossly 4+/5; pt is right handed  -ME       User Key  (r) = Recorded By, (t) = Taken By, (c) = Cosigned By    Initials Name Provider Type    Mohsen El OTR/L Occupational Therapist        Goals/Plan    No documentation.       Clinical Impression     Row Name 01/05/21 0830          Pain Assessment    Additional Documentation  Pain Scale: Numbers Pre/Post-Treatment (Group)  -ME     Row Name 01/05/21 0830          Pain Scale: Numbers Pre/Post-Treatment    Pretreatment Pain Rating  0/10 - no pain  -ME     Posttreatment Pain Rating  0/10 - no pain  -[facility] Name 01/05/21 0830          Plan of Care Review    Plan of Care Reviewed With  patient  -[facility] Name 01/05/21 0830          Therapy Assessment/Plan (OT)    Patient/Family Therapy Goal Statement (OT)  to return home  -ME     Criteria for Skilled Therapeutic Interventions Met (OT)  no;no problems identified which require skilled intervention;does not meet criteria for skilled intervention  -ME     Therapy Frequency (OT)  evaluation only  -[facility] Name 01/05/21 0830          Therapy Plan Review/Discharge Plan (OT)    Anticipated Discharge Disposition (OT)  home;home with assist  -[facility] Name 01/05/21 0830          Vital Signs    Pre Systolic BP Rehab  155  -ME     Pre Treatment Diastolic BP  97  -ME     Post Systolic BP Rehab  168 RN aware of elevated BP  -ME     Post Treatment Diastolic BP  103  -ME     Pretreatment Heart Rate (beats/min)  89  -ME     Posttreatment Heart Rate (beats/min)  93  -ME     Pre SpO2 (%)  99  -ME     O2 Delivery Pre Treatment  room air  -ME     Post SpO2 (%)  99  -ME     O2 Delivery Post Treatment  room air  -ME     Pre Patient Position  Sitting sitting up in bed  -ME     Post Patient Position  Sitting  -ME     Row Name 01/05/21 0830          Positioning and Restraints    Pre-Treatment Position  in bed  -ME     Post Treatment Position  bed  -ME     In Bed  notified nsg;supine;call light within reach;encouraged  to call for assist  -ME       User Key  (r) = Recorded By, (t) = Taken By, (c) = Cosigned By    Initials Name Provider Type    Mohsen El OTR/L Occupational Therapist        Outcome Measures     Row Name 01/05/21 0830          How much help from another is currently needed...    Putting on and taking off regular lower body clothing?  4  -ME     Bathing (including washing, rinsing, and drying)  4  -ME     Toileting (which includes using toilet bed pan or urinal)  4  -ME     Putting on and taking off regular upper body clothing  4  -ME     Taking care of personal grooming (such as brushing teeth)  4  -ME     Eating meals  4  -ME     AM-PAC 6 Clicks Score (OT)  24  -ME     Row Name 01/05/21 0830          Functional Assessment    Outcome Measure Options  AM-PAC 6 Clicks Daily Activity (OT)  -ME       User Key  (r) = Recorded By, (t) = Taken By, (c) = Cosigned By    Initials Name Provider Type    Mohsen El OTR/L Occupational Therapist        Occupational Therapy Education                 Title: PT OT SLP Therapies (In Progress)     Topic: Occupational Therapy (In Progress)     Point: ADL training (Not Started)     Description:   Instruct learner(s) on proper safety adaptation and remediation techniques during self care or transfers.   Instruct in proper use of assistive devices.              Learner Progress:  Not documented in this visit.          Point: Home exercise program (Not Started)     Description:   Instruct learner(s) on appropriate technique for monitoring, assisting and/or progressing therapeutic exercises/activities.              Learner Progress:  Not documented in this visit.          Point: Precautions (Done)     Description:   Instruct learner(s) on prescribed precautions during self-care and functional transfers.              Learning Progress Summary           Patient Acceptance, E, VU by ME at 1/5/2021 1208    Comment: Educated on OT and POC. Educated to call for assistance. Educated  on safety precautions.                   Point: Body mechanics (Not Started)     Description:   Instruct learner(s) on proper positioning and spine alignment during self-care, functional mobility activities and/or exercises.              Learner Progress:  Not documented in this visit.                      User Key     Initials Effective Dates Name Provider Type Discipline    ME 08/24/20 -  Mohsen Pearson, OTR/L Occupational Therapist OT              OT Recommendation and Plan  Retired Outcome Summary/Treatment Plan (OT)  Anticipated Discharge Disposition (OT): home, home with assist  Therapy Frequency (OT): evaluation only  Plan of Care Review  Plan of Care Reviewed With: patient  Outcome Summary: initial OT evaluation complete. co-eval with PT. pt was pleasant and cooperative throughout, but is really wanting to go back home. sititng up in bed with breakfast in front of her when therapy arrived. completed all transfers, functional mobility, and ADLs independently. no LOB noted with transfers or mobility. BP elevated at end of session. RN aware. no goals established. no further skilled OT services recommended at this time. OT orders will be discharged.  Plan of Care Reviewed With: patient  Outcome Summary: initial OT evaluation complete. co-eval with PT. pt was pleasant and cooperative throughout, but is really wanting to go back home. sititng up in bed with breakfast in front of her when therapy arrived. completed all transfers, functional mobility, and ADLs independently. no LOB noted with transfers or mobility. BP elevated at end of session. RN aware. no goals established. no further skilled OT services recommended at this time. OT orders will be discharged.     Time Calculation:   Time Calculation- OT     Row Name 01/05/21 1209             Time Calculation- OT    OT Start Time  0830  -ME      OT Stop Time  0856  -ME      OT Time Calculation (min)  26 min  -ME      OT Received On  01/05/21  -ME        User Key   (r) = Recorded By, (t) = Taken By, (c) = Cosigned By    Initials Name Provider Type    ME Mohsen Pearson OTR/L Occupational Therapist        Therapy Charges for Today     Code Description Service Date Service Provider Modifiers Qty    [id number] HC OT EVAL LOW COMPLEXITY 2 1/5/2021 Mohsen Pearson OTR/L GO 1               Mohsen Pearson OTR/JOSY  1/5/2021

## 2021-01-05 NOTE — PLAN OF CARE
"Goal Outcome Evaluation:  Plan of Care Reviewed With: patient  Progress: no change  Outcome Summary: PT eval complete, co-eval with OT. Pt pleasant and agreeable to PT/OT. Cooperative with evaluation but required encouragement to provide max effort. Pt demonstrated independence with all functional mobility and transfers. BLE MMT/ROM WFL. PT ambulated 50' in room with no AD, Pt initially ambulated with wide TWILA, when PT inquired about pt's gait pattern, she stated her \"feet were itching.\" PT cued pt to ambulate as normally as possible, gait pattern and speed much improved at that time, no instability or LOB noted. [place] 26/28 = low fall risk. BP elevated post eval, RN aware. Pt is not appropriate for skilled PT at this time d/t independence with all functional mobility and baseline level of function. Pt and RN agreeable. Will d/c from PT at this time.  "

## 2021-01-05 NOTE — PLAN OF CARE
Goal Outcome Evaluation:  Plan of Care Reviewed With: patient  Progress: no change  Outcome Summary: pt rested well throughout the night. VSS-hypertensive at times requiring PRN hydralazine. mild anxiety, tremors and nausea this shift controlled well with q6 ativan. up to [facility] for voiding. will continue to monitor.

## 2021-01-06 ENCOUNTER — READMISSION MANAGEMENT (OUTPATIENT)
Dept: CALL CENTER | Facility: HOSPITAL | Age: 41
End: 2021-01-06

## 2021-01-06 VITALS
SYSTOLIC BLOOD PRESSURE: 140 MMHG | HEART RATE: 82 BPM | HEIGHT: 66 IN | OXYGEN SATURATION: 96 % | DIASTOLIC BLOOD PRESSURE: 80 MMHG | RESPIRATION RATE: 18 BRPM | WEIGHT: 191 LBS | BODY MASS INDEX: 30.7 KG/M2 | TEMPERATURE: 98.4 F

## 2021-01-06 LAB
ANION GAP SERPL CALCULATED.3IONS-SCNC: 9 MMOL/L (ref 5–15)
BUN SERPL-MCNC: 7 MG/DL (ref 6–20)
BUN/CREAT SERPL: 10.3 (ref 7–25)
CALCIUM SPEC-SCNC: 8.9 MG/DL (ref 8.6–10.5)
CHLORIDE SERPL-SCNC: 101 MMOL/L (ref 98–107)
CO2 SERPL-SCNC: 22 MMOL/L (ref 22–29)
CREAT SERPL-MCNC: 0.68 MG/DL (ref 0.57–1)
GFR SERPL CREATININE-BSD FRML MDRD: 95 ML/MIN/1.73
GLUCOSE BLDC GLUCOMTR-MCNC: 131 MG/DL (ref 70–130)
GLUCOSE BLDC GLUCOMTR-MCNC: 217 MG/DL (ref 70–130)
GLUCOSE SERPL-MCNC: 135 MG/DL (ref 65–99)
POTASSIUM SERPL-SCNC: 4 MMOL/L (ref 3.5–5.2)
SODIUM SERPL-SCNC: 132 MMOL/L (ref 136–145)

## 2021-01-06 PROCEDURE — 82962 GLUCOSE BLOOD TEST: CPT

## 2021-01-06 PROCEDURE — 80048 BASIC METABOLIC PNL TOTAL CA: CPT | Performed by: INTERNAL MEDICINE

## 2021-01-06 RX ORDER — LOSARTAN POTASSIUM 25 MG/1
75 TABLET ORAL
Qty: 30 TABLET | Refills: 2 | Status: SHIPPED | OUTPATIENT
Start: 2021-01-06 | End: 2023-01-04 | Stop reason: SDUPTHER

## 2021-01-06 RX ADMIN — METOPROLOL TARTRATE 25 MG: 25 TABLET, FILM COATED ORAL at 09:00

## 2021-01-06 RX ADMIN — PANTOPRAZOLE SODIUM 40 MG: 40 TABLET, DELAYED RELEASE ORAL at 05:06

## 2021-01-06 RX ADMIN — SODIUM CHLORIDE 75 ML/HR: 9 INJECTION, SOLUTION INTRAVENOUS at 03:23

## 2021-01-06 RX ADMIN — ESCITALOPRAM OXALATE 20 MG: 10 TABLET ORAL at 09:00

## 2021-01-06 RX ADMIN — LOSARTAN POTASSIUM 75 MG: 50 TABLET, FILM COATED ORAL at 09:00

## 2021-01-06 NOTE — OUTREACH NOTE
Prep Survey      Responses   [facility] facility patient discharged from?  [facility]   Is LACE score < 7 ?  No   Emergency Room discharge w/ pulse ox?  No   Eligibility  [facility]   Date of Admission  01/03/21   Date of Discharge  01/06/21   Discharge diagnosis  Alcoholic ketoacidosis    Does the patient have one of the following disease processes/diagnoses(primary or secondary)?  Acute MI (STEMI,NSTEMI)   Does the patient have Home health ordered?  No   Is there a DME ordered?  No   Comments regarding appointments  please see AVS   Prep survey completed?  Yes          Otilia Solis RN

## 2021-01-06 NOTE — PLAN OF CARE
Problem: Adult Inpatient Plan of Care  Goal: Plan of Care Review  Outcome: Ongoing, Progressing  Flowsheets (Taken 1/6/2021 0113)  Progress: no change  Plan of Care Reviewed With: patient  Outcome Summary: patient has been sleeping every since she took her last norco  Goal: Patient-Specific Goal (Individualized)  Outcome: Ongoing, Progressing  Goal: Absence of Hospital-Acquired Illness or Injury  Outcome: Ongoing, Progressing  Intervention: Identify and Manage Fall Risk  Recent Flowsheet Documentation  Taken 1/5/2021 2100 by Lupe Webb RN  Safety Promotion/Fall Prevention: safety round/check completed  Taken 1/5/2021 1945 by Lupe Webb RN  Safety Promotion/Fall Prevention: safety round/check completed  Intervention: Prevent Skin Injury  Recent Flowsheet Documentation  Taken 1/5/2021 2100 by Lupe Webb RN  Body Position: position changed independently  Intervention: Prevent and Manage VTE (venous thromboembolism) Risk  Recent Flowsheet Documentation  Taken 1/5/2021 1945 by Lupe Webb, RN  VTE Prevention/Management: dorsiflexion/plantar flexion performed  Goal: Optimal Comfort and Wellbeing  Outcome: Ongoing, Progressing  Intervention: Provide Person-Centered Care  Recent Flowsheet Documentation  Taken 1/5/2021 1945 by Lupe Webb, RN  Trust Relationship/Rapport: care explained  Goal: Readiness for Transition of Care  Outcome: Ongoing, Progressing     Problem: Alcohol Withdrawal  Goal: Alcohol Withdrawal Symptom Control  Outcome: Ongoing, Progressing     Problem: Electrolyte Imbalance  Goal: Electrolyte Balance  Outcome: Ongoing, Progressing     Problem: Hypertension Acute  Goal: Blood Pressure Within Desired Range  Outcome: Ongoing, Progressing   Goal Outcome Evaluation:  Plan of Care Reviewed With: patient  Progress: no change  Outcome Summary: patient has been sleeping every since she took her last norco

## 2021-01-06 NOTE — DISCHARGE SUMMARY
[facility]  DISCHARGE SUMMARY       Date of Admission: 1/3/2021  Date of Discharge:  1/6/2021  Primary Care Physician: Provider, No Known    Presenting Problem/History of Present Illness:  Alcoholic ketoacidosis [E87.2]  Hypomagnesemia [E83.42]  Panic attack [F41.0]  Hypertensive urgency [I16.0]  Alcohol withdrawal syndrome with complication (CMS/HCC) [F10.239]  Alcoholic ketoacidosis [E87.2]       Final Discharge Diagnoses:  Active Hospital Problems    Diagnosis   • Alcoholic ketoacidosis   Hypokalemia  Hyponatremia    Consults:   Consults     Date and Time Order Name Status Description    1/3/2021 3794 Hospitalist (on-call MD unless specified)            Procedures Performed: None.                Pertinent Test Results:   Lab Results (last 7 days)     Procedure Component Value Units Date/Time    POC Glucose Once [[id number]]  (Abnormal) Collected: 01/06/21 1029    Specimen: Blood Updated: 01/06/21 1048     Glucose 217 mg/dL      Comment: RN NotifiedOperator: [id number] JAXSON Agarwal ID: [id number]       Basic Metabolic Panel [[id number]]  (Abnormal) Collected: 01/06/21 0600    Specimen: Blood Updated: 01/06/21 0638     Glucose 135 mg/dL      BUN 7 mg/dL      Creatinine 0.68 mg/dL      Sodium 132 mmol/L      Potassium 4.0 mmol/L      Chloride 101 mmol/L      CO2 22.0 mmol/L      Calcium 8.9 mg/dL      eGFR Non African Amer 95 mL/min/1.73      BUN/Creatinine Ratio 10.3     Anion Gap 9.0 mmol/L     Narrative:      GFR Normal >60  Chronic Kidney Disease <60  Kidney Failure <15      POC Glucose Once [[id number]]  (Abnormal) Collected: 01/06/21 0612    Specimen: Blood Updated: 01/06/21 0627     Glucose 131 mg/dL      Comment: RN NotifiedOperator: [id number] FADY Cotton ID: [id number]       POC Glucose Once [[id number]]  (Abnormal) Collected: 01/05/21 2001    Specimen: Blood Updated: 01/05/21 2043     Glucose 194 mg/dL      Comment: RN  NotifiedOperator: [id number] FADY BRYSONNIFERMeter ID: [id number]       Potassium [[id number]]  (Normal) Collected: 01/05/21 1832    Specimen: Blood Updated: 01/05/21 1845     Potassium 4.4 mmol/L     POC Glucose Once [[id number]]  (Abnormal) Collected: 01/05/21 1635    Specimen: Blood Updated: 01/05/21 1647     Glucose 161 mg/dL      Comment: RN NotifiedOperator: [id number] ROY CRYSTALMeter ID: [id number]       POC Glucose Once [[id number]]  (Abnormal) Collected: 01/05/21 1133    Specimen: Blood Updated: 01/05/21 1145     Glucose 250 mg/dL      Comment: : [id number] RAIDORENE AUDREYMeter ID: [id number]       POC Glucose Once [[id number]]  (Abnormal) Collected: 01/05/21 0621    Specimen: Blood Updated: 01/05/21 0638     Glucose 139 mg/dL      Comment: : [id number] TOBIAS CHRYSTALMeter ID: [id number]       Basic Metabolic Panel [[id number]]  (Abnormal) Collected: 01/05/21 0405    Specimen: Blood Updated: 01/05/21 0502     Glucose 141 mg/dL      BUN 6 mg/dL      Creatinine 0.63 mg/dL      Sodium 134 mmol/L      Potassium 3.6 mmol/L      Chloride 103 mmol/L      CO2 23.0 mmol/L      Calcium 8.8 mg/dL      eGFR Non African Amer 104 mL/min/1.73      BUN/Creatinine Ratio 9.5     Anion Gap 8.0 mmol/L     Narrative:      GFR Normal >60  Chronic Kidney Disease <60  Kidney Failure <15      CBC & Differential [[id number]]  (Normal) Collected: 01/05/21 0405    Specimen: Blood Updated: 01/05/21 0446    Narrative:      The following orders were created for panel order CBC & Differential.  Procedure                               Abnormality         Status                     ---------                               -----------         ------                     CBC Auto Differential[[id number]]        Normal              Final result                 Please view results for these tests on the individual orders.    CBC Auto Differential [[id number]]  (Normal) Collected: 01/05/21 0405    Specimen: Blood Updated:  01/05/21 0446     WBC 5.62 10*3/mm3      RBC 4.20 10*6/mm3      Hemoglobin 13.0 g/dL      Hematocrit 38.5 %      MCV 91.7 fL      MCH 31.0 pg      MCHC 33.8 g/dL      RDW 13.7 %      RDW-SD 46.0 fl      MPV 10.0 fL      Platelets 244 10*3/mm3      Neutrophil % 57.1 %      Lymphocyte % 32.0 %      Monocyte % 9.8 %      Eosinophil % 0.5 %      Basophil % 0.4 %      Immature Grans % 0.2 %      Neutrophils, Absolute 3.21 10*3/mm3      Lymphocytes, Absolute 1.80 10*3/mm3      Monocytes, Absolute 0.55 10*3/mm3      Eosinophils, Absolute 0.03 10*3/mm3      Basophils, Absolute 0.02 10*3/mm3      Immature Grans, Absolute 0.01 10*3/mm3      nRBC 0.0 /100 WBC     POC Glucose Once [[id number]]  (Abnormal) Collected: 01/05/21 0005    Specimen: Blood Updated: 01/05/21 0038     Glucose 148 mg/dL      Comment: : [id number] [employer] CHRYSTALMeter ID: [id number]       POC Glucose Once [[id number]]  (Abnormal) Collected: 01/04/21 2008    Specimen: Blood Updated: 01/05/21 0038     Glucose 178 mg/dL      Comment: : [id number] TOBIAS CHRYSTALMeter ID: [id number]       POC Glucose Once [[id number]]  (Abnormal) Collected: 01/04/21 1638    Specimen: Blood Updated: 01/04/21 1657     Glucose 194 mg/dL      Comment: : [id number] Fly me to the MoonREYMeter ID: [id number]       Potassium [[id number]]  (Normal) Collected: 01/04/21 1421    Specimen: Blood Updated: 01/04/21 1436     Potassium 4.1 mmol/L     POC Glucose Once [[id number]]  (Abnormal) Collected: 01/04/21 1244    Specimen: Blood Updated: 01/04/21 1258     Glucose 248 mg/dL      Comment: : [id number] Quick2LAUNCH AUDREYMeter ID: [id number]       Magnesium [[id number]]  (Normal) Collected: 01/04/21 0327    Specimen: Blood Updated: 01/04/21 0433     Magnesium 1.7 mg/dL     Comprehensive Metabolic Panel [[id number]]  (Abnormal) Collected: 01/04/21 0327    Specimen: Blood Updated: 01/04/21 0427     Glucose 194 mg/dL      BUN 10 mg/dL      Creatinine 0.69 mg/dL      Sodium  133 mmol/L      Potassium 3.3 mmol/L      Chloride 96 mmol/L      CO2 27.0 mmol/L      Calcium 8.7 mg/dL      Total Protein 7.1 g/dL      Albumin 3.80 g/dL      ALT (SGPT) 19 U/L      AST (SGOT) 22 U/L      Alkaline Phosphatase 72 U/L      Total Bilirubin 0.9 mg/dL      eGFR Non African Amer 93 mL/min/1.73      Globulin 3.3 gm/dL      A/G Ratio 1.2 g/dL      BUN/Creatinine Ratio 14.5     Anion Gap 10.0 mmol/L     Narrative:      GFR Normal >60  Chronic Kidney Disease <60  Kidney Failure <15      CK [[id number]]  (Normal) Collected: 01/04/21 0327    Specimen: Blood Updated: 01/04/21 0427     Creatine Kinase 127 U/L     Lipase [[id number]]  (Normal) Collected: 01/04/21 0327    Specimen: Blood Updated: 01/04/21 0427     Lipase 19 U/L     Lipid Panel [[id number]]  (Abnormal) Collected: 01/04/21 0327    Specimen: Blood Updated: 01/04/21 0427     Total Cholesterol 114 mg/dL      Triglycerides 61 mg/dL      HDL Cholesterol 70 mg/dL      LDL Cholesterol  31 mg/dL      VLDL Cholesterol 13 mg/dL      LDL/HDL Ratio 0.45    Narrative:      Cholesterol Reference Ranges  (U.S. Department of Health and Human Services ATP III Classifications)    Desirable          <200 mg/dL  Borderline High    200-239 mg/dL  High Risk          >240 mg/dL      Triglyceride Reference Ranges  (U.S. Department of Health and Human Services ATP III Classifications)    Normal           <150 mg/dL  Borderline High  150-199 mg/dL  High             200-499 mg/dL  Very High        >500 mg/dL    HDL Reference Ranges  (U.S. Department of Health and Human Services ATP III Classifcations)    Low     <40 mg/dl (major risk factor for CHD)  High    >60 mg/dl ('negative' risk factor for CHD)        LDL Reference Ranges  (U.S. Department of Health and Human Services ATP III Classifcations)    Optimal          <100 mg/dL  Near Optimal     100-129 mg/dL  Borderline High  130-159 mg/dL  High             160-189 mg/dL  Very High        >189 mg/dL    Phosphorus [[id number]]   (Normal) Collected: 01/04/21 0327    Specimen: Blood Updated: 01/04/21 0427     Phosphorus 2.8 mg/dL     Amylase [[id number]]  (Normal) Collected: 01/04/21 0327    Specimen: Blood Updated: 01/04/21 0427     Amylase 34 U/L     CBC Auto Differential [[id number]]  (Abnormal) Collected: 01/04/21 0327    Specimen: Blood Updated: 01/04/21 0426     WBC 7.76 10*3/mm3      RBC 3.99 10*6/mm3      Hemoglobin 12.4 g/dL      Hematocrit 35.5 %      MCV 89.0 fL      MCH 31.1 pg      MCHC 34.9 g/dL      RDW 13.6 %      RDW-SD 44.7 fl      MPV 9.8 fL      Platelets 243 10*3/mm3      Neutrophil % 65.7 %      Lymphocyte % 23.3 %      Monocyte % 10.3 %      Eosinophil % 0.1 %      Basophil % 0.3 %      Immature Grans % 0.3 %      Neutrophils, Absolute 5.10 10*3/mm3      Lymphocytes, Absolute 1.81 10*3/mm3      Monocytes, Absolute 0.80 10*3/mm3      Eosinophils, Absolute 0.01 10*3/mm3      Basophils, Absolute 0.02 10*3/mm3      Immature Grans, Absolute 0.02 10*3/mm3      nRBC 0.0 /100 WBC     Lactic Acid, Plasma [[id number]]  (Normal) Collected: 01/04/21 0327    Specimen: Blood Updated: 01/04/21 0421     Lactate 1.4 mmol/L     Extra Tubes [[id number]] Collected: 01/03/21 2021    Specimen: Blood, Venous Line Updated: 01/04/21 0030    Narrative:      The following orders were created for panel order Extra Tubes.  Procedure                               Abnormality         Status                     ---------                               -----------         ------                     Light Blue Top[[id number]]                                   Final result               Gold Top - [facility][[id number]]                                   Final result               [facility] Blood Culture Alex...[[id number]]                      Final result               Gray Top[[id number]]                                         Final result                 Please view results for these tests on the individual orders.    Gray Top [[id number]] Collected: 01/03/21 2021     Specimen: Blood Updated: 01/04/21 0030     Extra Tube Hold for add-ons.     Comment: Auto resulted.       Blood Gas, Arterial - [[id number]]  (Abnormal) Collected: 01/04/21 0008    Specimen: Arterial Blood Updated: 01/04/21 0013     Site Right Radial     Jadiel's Test N/A     pH, Arterial 7.458 pH units      Comment: 83 Value above reference range        pCO2, Arterial 35.4 mm Hg      pO2, Arterial 85.2 mm Hg      HCO3, Arterial 25.0 mmol/L      Base Excess, Arterial 1.4 mmol/L      O2 Saturation, Arterial 97.1 %      Barometric Pressure for Blood Gas 749 mmHg      Modality Room Air     Ventilator Mode NA     Collected by RRT     Comment: Meter: [id number]     :  [id number]       COVID-19 and FLU A/B PCR - Swab, Nasopharynx [[id number]]  (Normal) Collected: 01/03/21 2310    Specimen: Swab from Nasopharynx Updated: 01/04/21 0011     COVID19 Not Detected     Influenza A PCR Not Detected     Influenza B PCR Not Detected    Narrative:      Fact sheet for providers: https://www.fda.gov/media/267616/download    Fact sheet for patients: https://www.fda.gov/media/141428/download    Test performed by PCR.    Acetone [[id number]]  (Abnormal) Collected: 01/03/21 2022    Specimen: Blood Updated: 01/03/21 2347     Acetone Trace    hCG, Serum, Qualitative [[id number]]  (Normal) Collected: 01/03/21 2022    Specimen: Blood Updated: 01/03/21 2347     HCG Qualitative Negative    Light Blue Top [[id number]] Collected: 01/03/21 2022    Specimen: Blood Updated: 01/03/21 2130     Extra Tube hold for add-on     Comment: Auto resulted       Gold Top - SST [[id number]] Collected: 01/03/21 2022    Specimen: Blood Updated: 01/03/21 2130     Extra Tube Hold for add-ons.     Comment: Auto resulted.       [place] Blood Culture Bottle Set [[id number]] Collected: 01/03/21 2021    Specimen: Blood from Arm, Left Updated: 01/03/21 2130     Extra Tube Hold for add-ons.     Comment: Auto resulted.       TSH [[id number]]  (Normal) Collected: 01/03/21  2011    Specimen: Blood from Arm, Left Updated: 01/03/21 2048     TSH 2.720 uIU/mL     Comprehensive Metabolic Panel [[id number]]  (Abnormal) Collected: 01/03/21 2011    Specimen: Blood from Arm, Left Updated: 01/03/21 2042     Glucose 232 mg/dL      BUN 15 mg/dL      Creatinine 0.91 mg/dL      Sodium 135 mmol/L      Potassium 3.4 mmol/L      Chloride 92 mmol/L      CO2 22.0 mmol/L      Calcium 9.6 mg/dL      Total Protein 8.6 g/dL      Albumin 4.70 g/dL      ALT (SGPT) 27 U/L      AST (SGOT) 27 U/L      Alkaline Phosphatase 91 U/L      Total Bilirubin 1.0 mg/dL      eGFR Non African Amer 68 mL/min/1.73      Globulin 3.9 gm/dL      A/G Ratio 1.2 g/dL      BUN/Creatinine Ratio 16.5     Anion Gap 21.0 mmol/L     Narrative:      GFR Normal >60  Chronic Kidney Disease <60  Kidney Failure <15      Lipase [[id number]]  (Normal) Collected: 01/03/21 2011    Specimen: Blood from Arm Left Updated: 01/03/21 2042     Lipase 23 U/L     Ethanol [[id number]] Collected: 01/03/21 2011    Specimen: Blood from Arm, Left Updated: 01/03/21 2042     Ethanol <10 mg/dL      Ethanol % <0.010 %     Magnesium [[id number]]  (Abnormal) Collected: 01/03/21 2011    Specimen: Blood from Arm Left Updated: 01/03/21 2042     Magnesium 1.1 mg/dL     Urinalysis, Microscopic Only - Urine, Clean Catch [[id number]]  (Abnormal) Collected: 01/03/21 2015    Specimen: Urine, Clean Catch Updated: 01/03/21 2040     RBC, UA None Seen /HPF      WBC, UA 0-2 /HPF      Bacteria, UA 2+ /HPF      Squamous Epithelial Cells, UA 6-12 /HPF      Hyaline Casts, UA 0-2 /LPF      Methodology Automated Microscopy    Urine Drug Screen - Urine, Clean Catch [[id number]]  (Normal) Collected: 01/03/21 2015    Specimen: Urine, Clean Catch Updated: 01/03/21 2038     THC, Screen, Urine Negative     Phencyclidine (PCP), Urine Negative     Cocaine Screen, Urine Negative     Methamphetamine, Ur Negative     Opiate Screen Negative     Amphetamine Screen, Urine Negative     Benzodiazepine  Screen, Urine Negative     Tricyclic Antidepressants Screen Negative     Methadone Screen, Urine Negative     Barbiturates Screen, Urine Negative     Oxycodone Screen, Urine Negative     Propoxyphene Screen Negative     Buprenorphine, Screen, Urine Negative    Narrative:      Cutoff For Drugs Screened:    Amphetamines               500 ng/ml  Barbiturates               200 ng/ml  Benzodiazepines            150 ng/ml  Cocaine                    150 ng/ml  Methadone                  200 ng/ml  Opiates                    100 ng/ml  Phencyclidine               25 ng/ml  THC                            50 ng/ml  Methamphetamine            500 ng/ml  Tricyclic Antidepressants  300 ng/ml  Oxycodone                  100 ng/ml  Propoxyphene               300 ng/ml  Buprenorphine               10 ng/ml    The normal value for all drugs tested is negative. This report includes unconfirmed screening results, with the cutoff values listed, to be used for medical treatment purposes only.  Unconfirmed results must not be used for non-medical purposes such as employment or legal testing.  Clinical consideration should be applied to any drug of abuse test, particularly when unconfirmed results are used.      Urinalysis With Microscopic If Indicated (No Culture) - Urine, Clean Catch [[id number]]  (Abnormal) Collected: 01/03/21 2015    Specimen: Urine, Clean Catch Updated: 01/03/21 2031     Color, UA Yellow     Appearance, UA Clear     pH, UA 6.0     Specific Gravity, UA 1.034     Comment: Result obtained by Refractometer        Glucose,  mg/dL (1+)     Ketones, UA 40 mg/dL (2+)     Bilirubin, UA Small (1+)     Blood, UA Trace     Protein, UA >=300 mg/dL (3+)     Leuk Esterase, UA Negative     Nitrite, UA Negative     Urobilinogen, UA 0.2 E.U./dL    CBC & Differential [[id number]]  (Abnormal) Collected: 01/03/21 2011    Specimen: Blood from Arm, Left Updated: 01/03/21 2023    Narrative:      The following orders were created for  panel order CBC & Differential.  Procedure                               Abnormality         Status                     ---------                               -----------         ------                     CBC Auto Differential[[id number]]        Abnormal            Final result                 Please view results for these tests on the individual orders.    CBC Auto Differential [[id number]]  (Abnormal) Collected: 01/03/21 2011    Specimen: Blood from Arm, Left Updated: 01/03/21 2023     WBC 12.14 10*3/mm3      RBC 4.67 10*6/mm3      Hemoglobin 14.6 g/dL      Hematocrit 41.5 %      MCV 88.9 fL      MCH 31.3 pg      MCHC 35.2 g/dL      RDW 13.5 %      RDW-SD 44.3 fl      MPV 9.6 fL      Platelets 358 10*3/mm3      Neutrophil % 77.8 %      Lymphocyte % 14.7 %      Monocyte % 6.8 %      Eosinophil % 0.0 %      Basophil % 0.3 %      Immature Grans % 0.4 %      Neutrophils, Absolute 9.44 10*3/mm3      Lymphocytes, Absolute 1.78 10*3/mm3      Monocytes, Absolute 0.83 10*3/mm3      Eosinophils, Absolute 0.00 10*3/mm3      Basophils, Absolute 0.04 10*3/mm3      Immature Grans, Absolute 0.05 10*3/mm3      nRBC 0.0 /100 WBC         Imaging Results (Last 7 Days)     ** No results found for the last 168 hours. **            Chief Complaint on Day of Discharge: None.    Hospital Course:  The patient was admitted secondary to history of alcoholism with alcohol withdrawal and started on IV hydration and alcohol withdrawal protocol with prophylactic thiamine.  She did improve and was no longer in withdrawal before discharge.  She was advised to seek counseling regarding alcoholism.    She had hypokalemia which resolved with potassium repletion.  She was also noted to have hyponatremia which did improve with isotonic saline.    Patient was noted to have elevated blood pressure and was started on Cozaar and beta-blocker.  Blood pressure was stable on discharge.    She was continued on her outpatient medications for depressive  "disorder and diabetes mellitus and was continued on nicotine patch for nicotine dependence.      Condition on Discharge: Stable and improved.    Physical Exam on Discharge:  /80 (BP Location: Right arm, Patient Position: Lying)   Pulse 82   Temp 98.4 °F (36.9 °C) (Oral)   Resp 18   Ht 167.6 cm (66\")   Wt 86.6 kg (191 lb)   LMP  (LMP Unknown)   SpO2 96%   Breastfeeding No   BMI 30.83 kg/m²   Physical Exam  Vitals signs and nursing note reviewed.   Constitutional:       General: She is not in acute distress.     Appearance: She is well-developed. She is not diaphoretic.   HENT:      Head: Normocephalic and atraumatic.      Right Ear: External ear normal.      Left Ear: External ear normal.      Nose: Nose normal.   Eyes:      Conjunctiva/sclera: Conjunctivae normal.      Pupils: Pupils are equal, round, and reactive to light.   Neck:      Musculoskeletal: Normal range of motion and neck supple.      Thyroid: No thyromegaly.      Vascular: No JVD.   Cardiovascular:      Rate and Rhythm: Normal rate and regular rhythm.      Heart sounds: Normal heart sounds. No murmur. No friction rub. No gallop.    Pulmonary:      Effort: Pulmonary effort is normal. No respiratory distress.      Breath sounds: Normal breath sounds. No stridor. No wheezing or rales.   Chest:      Chest wall: No tenderness.   Abdominal:      General: Bowel sounds are normal. There is no distension.      Palpations: Abdomen is soft. There is no mass.      Tenderness: There is no abdominal tenderness. There is no guarding or rebound.      Hernia: No hernia is present.   Musculoskeletal: Normal range of motion.         General: No tenderness or deformity.   Skin:     General: Skin is warm and dry.      Coloration: Skin is not jaundiced or pale.      Findings: No bruising, erythema, lesion or rash.   Neurological:      General: No focal deficit present.      Mental Status: She is alert and oriented to person, place, and time. Mental status is " at baseline.      Cranial Nerves: No cranial nerve deficit.      Sensory: No sensory deficit.      Motor: No weakness.      Coordination: Coordination normal.      Gait: Gait normal.      Deep Tendon Reflexes: Reflexes are normal and symmetric. Reflexes normal.   Psychiatric:         Mood and Affect: Mood normal.         Behavior: Behavior normal. Behavior is cooperative.         Thought Content: Thought content normal.         Judgment: Judgment normal.           Discharge Disposition:  Home or Self Care    Discharge Medications:     Discharge Medications      New Medications      Instructions Start Date   losartan 25 MG tablet  Commonly known as: COZAAR   75 mg, Oral, Every 24 Hours Scheduled      metoprolol tartrate 25 MG tablet  Commonly known as: LOPRESSOR   25 mg, Oral, Every 12 Hours Scheduled         Continue These Medications      Instructions Start Date   albuterol sulfate  (90 Base) MCG/ACT inhaler  Commonly known as: PROVENTIL HFA;VENTOLIN HFA;PROAIR HFA   2 puffs, Inhalation, 4 Times Daily - RT      escitalopram 20 MG tablet  Commonly known as: LEXAPRO   20 mg, Oral, Daily      insulin aspart 100 UNIT/ML injection  Commonly known as: novoLOG   0-9 Units, Subcutaneous, 4 Times Daily Before Meals & Nightly      insulin detemir 100 UNIT/ML injection  Commonly known as: LEVEMIR   10 Units, Subcutaneous, Nightly      Lancets 28G misc   1 each      metFORMIN 1000 MG tablet  Commonly known as: GLUCOPHAGE   1,000 mg, Oral, 2 Times Daily      nicotine 21 MG/24HR patch  Commonly known as: NICODERM CQ   1 patch, Transdermal, Every 24 Hours      omeprazole 40 MG capsule  Commonly known as: priLOSEC   TAKE ONE CAPSULE BY MOUTH DAILY      ONE TOUCH ULTRA 2 w/Device kit   No dose, route, or frequency recorded.      traZODone 50 MG tablet  Commonly known as: DESYREL   50 mg, Oral, Nightly PRN         Stop These Medications    naltrexone 50 MG tablet  Commonly known as: DEPADE            Discharge Diet: Cardiac  and diabetic.    Activity at Discharge: As tolerated.    Discharge Care Plan/Instructions: Patient has been advised to take her medications as prescribed and to return to the emergency room the event of any worsening symptoms.    Follow-up Appointments: Patient currently does not have a primary care provider but will be set up with one.  She will be seen for follow-up as outpatient in 1 to 2 weeks.    Test Results Pending at Discharge:     Danial Foster MD  01/06/21  10:29 CST    Time: 35 minutes.

## 2021-01-07 ENCOUNTER — TRANSITIONAL CARE MANAGEMENT TELEPHONE ENCOUNTER (OUTPATIENT)
Dept: CALL CENTER | Facility: HOSPITAL | Age: 41
End: 2021-01-07

## 2021-01-07 NOTE — OUTREACH NOTE
Call Center TCM Note      Responses   [facility] patient discharged from?  [place]   Does the patient have one of the following disease processes/diagnoses(primary or secondary)?  Other   TCM attempt successful?  No   Unsuccessful attempts  Attempt 1          Eli Díaz LPN    1/7/2021, 12:47 EST

## 2021-01-07 NOTE — PAYOR COMM NOTE
"Karolina Del Cid  [facility]  P: [phone]  F: [phone]        ArthurAmadouMaddy (40 y.o. Female)     Date of Birth Social Security Number Address Home Phone MRN    1980  [address]  [facility] [phone] [id number]    Gnosticism Marital Status          None Single       Admission Date Admission Type Admitting Provider Attending Provider Department, Room/Bed    1/3/21 Emergency Danial Foster MD  [facility], 370/1    Discharge Date Discharge Disposition Discharge Destination        1/6/2021 Home or Self Care Home             Attending Provider: (none)   Allergies: Lisinopril, Penicillins    Isolation: None   Infection: None   Code Status: Prior    Ht: 167.6 cm (66\")   Wt: 86.6 kg (191 lb)    Admission Cmt: None   Principal Problem: None                Active Insurance as of 1/3/2021     Primary Coverage     Payor Plan Insurance Group Employer/Plan Group    [facility]      Payor Plan Address Payor Plan Phone Number Payor Plan Fax Number Effective Dates    [address] [address]   7/1/2016 - None Entered    [place] [address]       Subscriber Name Subscriber Birth Date Member ID       AMADOU MENDOZA ANN 1980 [id number]                 Emergency Contacts      (Rel.) Home Phone Work Phone Mobile Phone    Boone Mendoza (Father) [phone] -- [phone]               Discharge Summary      Danial Foster MD at 01/06/21 [address] [place] [address] Medicine Services  DISCHARGE SUMMARY       Date of Admission: 1/3/2021  Date of Discharge:  1/6/2021  Primary Care Physician: Provider, No Known    Presenting Problem/History of Present Illness:  Alcoholic ketoacidosis [E87.2]  Hypomagnesemia [E83.42]  Panic attack [F41.0]  Hypertensive urgency [I16.0]  Alcohol withdrawal syndrome with complication (CMS/HCC) [F10.239]  Alcoholic ketoacidosis " [E87.2]       Final Discharge Diagnoses:  Active Hospital Problems    Diagnosis   • Alcoholic ketoacidosis   Hypokalemia  Hyponatremia    Consults:   Consults     Date and Time Order Name Status Description    1/3/2021 2307 Hospitalist (on-call MD unless specified)            Procedures Performed: None.                Pertinent Test Results:   Lab Results (last 7 days)     Procedure Component Value Units Date/Time    POC Glucose Once [[id number]]  (Abnormal) Collected: 01/06/21 1029    Specimen: Blood Updated: 01/06/21 1048     Glucose 217 mg/dL      Comment: RN NotifiedOperator: [id number] JAXSON Agarwal ID: [id number]       Basic Metabolic Panel [[id number]]  (Abnormal) Collected: 01/06/21 0600    Specimen: Blood Updated: 01/06/21 0638     Glucose 135 mg/dL      BUN 7 mg/dL      Creatinine 0.68 mg/dL      Sodium 132 mmol/L      Potassium 4.0 mmol/L      Chloride 101 mmol/L      CO2 22.0 mmol/L      Calcium 8.9 mg/dL      eGFR Non African Amer 95 mL/min/1.73      BUN/Creatinine Ratio 10.3     Anion Gap 9.0 mmol/L     Narrative:      GFR Normal >60  Chronic Kidney Disease <60  Kidney Failure <15      POC Glucose Once [[id number]]  (Abnormal) Collected: 01/06/21 0612    Specimen: Blood Updated: 01/06/21 0627     Glucose 131 mg/dL      Comment: RN NotifiedOperator: [id number] FADY LINGFERMeter ID: [id number]       POC Glucose Once [[id number]]  (Abnormal) Collected: 01/05/21 2001    Specimen: Blood Updated: 01/05/21 2043     Glucose 194 mg/dL      Comment: RN NotifiedOperator: [id number] FADY BRYSONNIFERMeter ID: [id number]       Potassium [[id number]]  (Normal) Collected: 01/05/21 1832    Specimen: Blood Updated: 01/05/21 1845     Potassium 4.4 mmol/L     POC Glucose Once [[id number]]  (Abnormal) Collected: 01/05/21 1635    Specimen: Blood Updated: 01/05/21 1647     Glucose 161 mg/dL      Comment: RN NotifiedOperator: [id number] ROY CRYSTALMeter ID: [id number]       POC Glucose Once [[id number]]   (Abnormal) Collected: 01/05/21 1133    Specimen: Blood Updated: 01/05/21 1145     Glucose 250 mg/dL      Comment: : [id number] CHASE AUDREYMeter ID: [id number]       POC Glucose Once [[id number]]  (Abnormal) Collected: 01/05/21 0621    Specimen: Blood Updated: 01/05/21 0638     Glucose 139 mg/dL      Comment: : [id number] TOBIAS CHRYSTALMeter ID: [id number]       Basic Metabolic Panel [[id number]]  (Abnormal) Collected: 01/05/21 0405    Specimen: Blood Updated: 01/05/21 0502     Glucose 141 mg/dL      BUN 6 mg/dL      Creatinine 0.63 mg/dL      Sodium 134 mmol/L      Potassium 3.6 mmol/L      Chloride 103 mmol/L      CO2 23.0 mmol/L      Calcium 8.8 mg/dL      eGFR Non African Amer 104 mL/min/1.73      BUN/Creatinine Ratio 9.5     Anion Gap 8.0 mmol/L     Narrative:      GFR Normal >60  Chronic Kidney Disease <60  Kidney Failure <15      CBC & Differential [[id number]]  (Normal) Collected: 01/05/21 0405    Specimen: Blood Updated: 01/05/21 0446    Narrative:      The following orders were created for panel order CBC & Differential.  Procedure                               Abnormality         Status                     ---------                               -----------         ------                     CBC Auto Differential[[id number]]        Normal              Final result                 Please view results for these tests on the individual orders.    CBC Auto Differential [[id number]]  (Normal) Collected: 01/05/21 0405    Specimen: Blood Updated: 01/05/21 0446     WBC 5.62 10*3/mm3      RBC 4.20 10*6/mm3      Hemoglobin 13.0 g/dL      Hematocrit 38.5 %      MCV 91.7 fL      MCH 31.0 pg      MCHC 33.8 g/dL      RDW 13.7 %      RDW-SD 46.0 fl      MPV 10.0 fL      Platelets 244 10*3/mm3      Neutrophil % 57.1 %      Lymphocyte % 32.0 %      Monocyte % 9.8 %      Eosinophil % 0.5 %      Basophil % 0.4 %      Immature Grans % 0.2 %      Neutrophils, Absolute 3.21 10*3/mm3      Lymphocytes, Absolute  1.80 10*3/mm3      Monocytes, Absolute 0.55 10*3/mm3      Eosinophils, Absolute 0.03 10*3/mm3      Basophils, Absolute 0.02 10*3/mm3      Immature Grans, Absolute 0.01 10*3/mm3      nRBC 0.0 /100 WBC     POC Glucose Once [[id number]]  (Abnormal) Collected: 01/05/21 0005    Specimen: Blood Updated: 01/05/21 0038     Glucose 148 mg/dL      Comment: : [id number] TOBIAS CHRYSTALMeter ID: [id number]       POC Glucose Once [[id number]]  (Abnormal) Collected: 01/04/21 2008    Specimen: Blood Updated: 01/05/21 0038     Glucose 178 mg/dL      Comment: : [id number] TOBIAS CHRYSTALMeter ID: [id number]       POC Glucose Once [[id number]]  (Abnormal) Collected: 01/04/21 1638    Specimen: Blood Updated: 01/04/21 1657     Glucose 194 mg/dL      Comment: : [id number] [employer] PLREYMeter ID: [id number]       Potassium [[id number]]  (Normal) Collected: 01/04/21 1421    Specimen: Blood Updated: 01/04/21 1436     Potassium 4.1 mmol/L     POC Glucose Once [[id number]]  (Abnormal) Collected: 01/04/21 1244    Specimen: Blood Updated: 01/04/21 1258     Glucose 248 mg/dL      Comment: : [id number] IPOCK MagneticREYMeter ID: [id number]       Magnesium [[id number]]  (Normal) Collected: 01/04/21 0327    Specimen: Blood Updated: 01/04/21 0433     Magnesium 1.7 mg/dL     Comprehensive Metabolic Panel [[id number]]  (Abnormal) Collected: 01/04/21 0327    Specimen: Blood Updated: 01/04/21 0427     Glucose 194 mg/dL      BUN 10 mg/dL      Creatinine 0.69 mg/dL      Sodium 133 mmol/L      Potassium 3.3 mmol/L      Chloride 96 mmol/L      CO2 27.0 mmol/L      Calcium 8.7 mg/dL      Total Protein 7.1 g/dL      Albumin 3.80 g/dL      ALT (SGPT) 19 U/L      AST (SGOT) 22 U/L      Alkaline Phosphatase 72 U/L      Total Bilirubin 0.9 mg/dL      eGFR Non African Amer 93 mL/min/1.73      Globulin 3.3 gm/dL      A/G Ratio 1.2 g/dL      BUN/Creatinine Ratio 14.5     Anion Gap 10.0 mmol/L     Narrative:      GFR Normal >60  Chronic  Kidney Disease <60  Kidney Failure <15      CK [[id number]]  (Normal) Collected: 01/04/21 0327    Specimen: Blood Updated: 01/04/21 0427     Creatine Kinase 127 U/L     Lipase [[id number]]  (Normal) Collected: 01/04/21 0327    Specimen: Blood Updated: 01/04/21 0427     Lipase 19 U/L     Lipid Panel [[id number]]  (Abnormal) Collected: 01/04/21 0327    Specimen: Blood Updated: 01/04/21 0427     Total Cholesterol 114 mg/dL      Triglycerides 61 mg/dL      HDL Cholesterol 70 mg/dL      LDL Cholesterol  31 mg/dL      VLDL Cholesterol 13 mg/dL      LDL/HDL Ratio 0.45    Narrative:      Cholesterol Reference Ranges  (U.S. Department of Health and Human Services ATP III Classifications)    Desirable          <200 mg/dL  Borderline High    200-239 mg/dL  High Risk          >240 mg/dL      Triglyceride Reference Ranges  (U.S. Department of Health and Human Services ATP III Classifications)    Normal           <150 mg/dL  Borderline High  150-199 mg/dL  High             200-499 mg/dL  Very High        >500 mg/dL    HDL Reference Ranges  (U.S. Department of Health and Human Services ATP III Classifcations)    Low     <40 mg/dl (major risk factor for CHD)  High    >60 mg/dl ('negative' risk factor for CHD)        LDL Reference Ranges  (U.S. Department of Health and Human Services ATP III Classifcations)    Optimal          <100 mg/dL  Near Optimal     100-129 mg/dL  Borderline High  130-159 mg/dL  High             160-189 mg/dL  Very High        >189 mg/dL    Phosphorus [[id number]]  (Normal) Collected: 01/04/21 0327    Specimen: Blood Updated: 01/04/21 0427     Phosphorus 2.8 mg/dL     Amylase [[id number]]  (Normal) Collected: 01/04/21 0327    Specimen: Blood Updated: 01/04/21 0427     Amylase 34 U/L     CBC Auto Differential [[id number]]  (Abnormal) Collected: 01/04/21 0327    Specimen: Blood Updated: 01/04/21 0426     WBC 7.76 10*3/mm3      RBC 3.99 10*6/mm3      Hemoglobin 12.4 g/dL      Hematocrit 35.5 %      MCV 89.0 fL      MCH  31.1 pg      MCHC 34.9 g/dL      RDW 13.6 %      RDW-SD 44.7 fl      MPV 9.8 fL      Platelets 243 10*3/mm3      Neutrophil % 65.7 %      Lymphocyte % 23.3 %      Monocyte % 10.3 %      Eosinophil % 0.1 %      Basophil % 0.3 %      Immature Grans % 0.3 %      Neutrophils, Absolute 5.10 10*3/mm3      Lymphocytes, Absolute 1.81 10*3/mm3      Monocytes, Absolute 0.80 10*3/mm3      Eosinophils, Absolute 0.01 10*3/mm3      Basophils, Absolute 0.02 10*3/mm3      Immature Grans, Absolute 0.02 10*3/mm3      nRBC 0.0 /100 WBC     Lactic Acid, Plasma [[id number]]  (Normal) Collected: 01/04/21 0327    Specimen: Blood Updated: 01/04/21 0421     Lactate 1.4 mmol/L     Extra Tubes [[id number]] Collected: 01/03/21 2021    Specimen: Blood, Venous Line Updated: 01/04/21 0030    Narrative:      The following orders were created for panel order Extra Tubes.  Procedure                               Abnormality         Status                     ---------                               -----------         ------                     Light Blue Top[[id number]]                                   Final result               Gold Top - SST[[id number]]                                   Final result               [place] Blood Culture Alex...[[id number]]                      Final result               Gray Top[[id number]]                                         Final result                 Please view results for these tests on the individual orders.    Gray Top [[id number]] Collected: 01/03/21 2021    Specimen: Blood Updated: 01/04/21 0030     Extra Tube Hold for add-ons.     Comment: Auto resulted.       Blood Gas, Arterial - [[id number]]  (Abnormal) Collected: 01/04/21 0008    Specimen: Arterial Blood Updated: 01/04/21 0013     Site Right Radial     Jadiel's Test N/A     pH, Arterial 7.458 pH units      Comment: 83 Value above reference range        pCO2, Arterial 35.4 mm Hg      pO2, Arterial 85.2 mm Hg      HCO3, Arterial 25.0 mmol/L      Base Excess,  Arterial 1.4 mmol/L      O2 Saturation, Arterial 97.1 %      Barometric Pressure for Blood Gas 749 mmHg      Modality Room Air     Ventilator Mode NA     Collected by RRT     Comment: Meter: [id number]     :  [id number]       COVID-19 and FLU A/B PCR - Swab, Nasopharynx [[id number]]  (Normal) Collected: 01/03/21 2310    Specimen: Swab from Nasopharynx Updated: 01/04/21 0011     COVID19 Not Detected     Influenza A PCR Not Detected     Influenza B PCR Not Detected    Narrative:      Fact sheet for providers: https://www.fda.gov/media/238448/download    Fact sheet for patients: https://www.fda.gov/media/253969/download    Test performed by PCR.    Acetone [[id number]]  (Abnormal) Collected: 01/03/21 2022    Specimen: Blood Updated: 01/03/21 2347     Acetone Trace    hCG, Serum, Qualitative [[id number]]  (Normal) Collected: 01/03/21 2022    Specimen: Blood Updated: 01/03/21 2347     HCG Qualitative Negative    Light Blue Top [[id number]] Collected: 01/03/21 2022    Specimen: Blood Updated: 01/03/21 2130     Extra Tube hold for add-on     Comment: Auto resulted       Gold Top - SST [[id number]] Collected: 01/03/21 2022    Specimen: Blood Updated: 01/03/21 2130     Extra Tube Hold for add-ons.     Comment: Auto resulted.       [facility] Blood Culture Bottle Set [[id number]] Collected: 01/03/21 2021    Specimen: Blood from Arm, Left Updated: 01/03/21 2130     Extra Tube Hold for add-ons.     Comment: Auto resulted.       TSH [[id number]]  (Normal) Collected: 01/03/21 2011    Specimen: Blood from Arm, Left Updated: 01/03/21 2048     TSH 2.720 uIU/mL     Comprehensive Metabolic Panel [[id number]]  (Abnormal) Collected: 01/03/21 2011    Specimen: Blood from Arm, Left Updated: 01/03/21 2042     Glucose 232 mg/dL      BUN 15 mg/dL      Creatinine 0.91 mg/dL      Sodium 135 mmol/L      Potassium 3.4 mmol/L      Chloride 92 mmol/L      CO2 22.0 mmol/L      Calcium 9.6 mg/dL      Total Protein 8.6 g/dL      Albumin 4.70 g/dL       ALT (SGPT) 27 U/L      AST (SGOT) 27 U/L      Alkaline Phosphatase 91 U/L      Total Bilirubin 1.0 mg/dL      eGFR Non African Amer 68 mL/min/1.73      Globulin 3.9 gm/dL      A/G Ratio 1.2 g/dL      BUN/Creatinine Ratio 16.5     Anion Gap 21.0 mmol/L     Narrative:      GFR Normal >60  Chronic Kidney Disease <60  Kidney Failure <15      Lipase [[id number]]  (Normal) Collected: 01/03/21 2011    Specimen: Blood from Arm, Left Updated: 01/03/21 2042     Lipase 23 U/L     Ethanol [[id number]] Collected: 01/03/21 2011    Specimen: Blood from Arm, Left Updated: 01/03/21 2042     Ethanol <10 mg/dL      Ethanol % <0.010 %     Magnesium [[id number]]  (Abnormal) Collected: 01/03/21 2011    Specimen: Blood from Arm, Left Updated: 01/03/21 2042     Magnesium 1.1 mg/dL     Urinalysis, Microscopic Only - Urine, Clean Catch [[id number]]  (Abnormal) Collected: 01/03/21 2015    Specimen: Urine, Clean Catch Updated: 01/03/21 2040     RBC, UA None Seen /HPF      WBC, UA 0-2 /HPF      Bacteria, UA 2+ /HPF      Squamous Epithelial Cells, UA 6-12 /HPF      Hyaline Casts, UA 0-2 /LPF      Methodology Automated Microscopy    Urine Drug Screen - Urine, Clean Catch [[id number]]  (Normal) Collected: 01/03/21 2015    Specimen: Urine, Clean Catch Updated: 01/03/21 2038     THC, Screen, Urine Negative     Phencyclidine (PCP), Urine Negative     Cocaine Screen, Urine Negative     Methamphetamine, Ur Negative     Opiate Screen Negative     Amphetamine Screen, Urine Negative     Benzodiazepine Screen, Urine Negative     Tricyclic Antidepressants Screen Negative     Methadone Screen, Urine Negative     Barbiturates Screen, Urine Negative     Oxycodone Screen, Urine Negative     Propoxyphene Screen Negative     Buprenorphine, Screen, Urine Negative    Narrative:      Cutoff For Drugs Screened:    Amphetamines               500 ng/ml  Barbiturates               200 ng/ml  Benzodiazepines            150 ng/ml  Cocaine                    150  ng/ml  Methadone                  200 ng/ml  Opiates                    100 ng/ml  Phencyclidine               25 ng/ml  THC                            50 ng/ml  Methamphetamine            500 ng/ml  Tricyclic Antidepressants  300 ng/ml  Oxycodone                  100 ng/ml  Propoxyphene               300 ng/ml  Buprenorphine               10 ng/ml    The normal value for all drugs tested is negative. This report includes unconfirmed screening results, with the cutoff values listed, to be used for medical treatment purposes only.  Unconfirmed results must not be used for non-medical purposes such as employment or legal testing.  Clinical consideration should be applied to any drug of abuse test, particularly when unconfirmed results are used.      Urinalysis With Microscopic If Indicated (No Culture) - Urine, Clean Catch [[id number]]  (Abnormal) Collected: 01/03/21 2015    Specimen: Urine, Clean Catch Updated: 01/03/21 2031     Color, UA Yellow     Appearance, UA Clear     pH, UA 6.0     Specific Gravity, UA 1.034     Comment: Result obtained by Refractometer        Glucose,  mg/dL (1+)     Ketones, UA 40 mg/dL (2+)     Bilirubin, UA Small (1+)     Blood, UA Trace     Protein, UA >=300 mg/dL (3+)     Leuk Esterase, UA Negative     Nitrite, UA Negative     Urobilinogen, UA 0.2 E.U./dL    CBC & Differential [[id number]]  (Abnormal) Collected: 01/03/21 2011    Specimen: Blood from Arm, Left Updated: 01/03/21 2023    Narrative:      The following orders were created for panel order CBC & Differential.  Procedure                               Abnormality         Status                     ---------                               -----------         ------                     CBC Auto Differential[[id number]]        Abnormal            Final result                 Please view results for these tests on the individual orders.    CBC Auto Differential [[id number]]  (Abnormal) Collected: 01/03/21 2011    Specimen: Blood  "from Arm, Left Updated: 01/03/21 2023     WBC 12.14 10*3/mm3      RBC 4.67 10*6/mm3      Hemoglobin 14.6 g/dL      Hematocrit 41.5 %      MCV 88.9 fL      MCH 31.3 pg      MCHC 35.2 g/dL      RDW 13.5 %      RDW-SD 44.3 fl      MPV 9.6 fL      Platelets 358 10*3/mm3      Neutrophil % 77.8 %      Lymphocyte % 14.7 %      Monocyte % 6.8 %      Eosinophil % 0.0 %      Basophil % 0.3 %      Immature Grans % 0.4 %      Neutrophils, Absolute 9.44 10*3/mm3      Lymphocytes, Absolute 1.78 10*3/mm3      Monocytes, Absolute 0.83 10*3/mm3      Eosinophils, Absolute 0.00 10*3/mm3      Basophils, Absolute 0.04 10*3/mm3      Immature Grans, Absolute 0.05 10*3/mm3      nRBC 0.0 /100 WBC         Imaging Results (Last 7 Days)     ** No results found for the last 168 hours. **            Chief Complaint on Day of Discharge: None.    Hospital Course:  The patient was admitted secondary to history of alcoholism with alcohol withdrawal and started on IV hydration and alcohol withdrawal protocol with prophylactic thiamine.  She did improve and was no longer in withdrawal before discharge.  She was advised to seek counseling regarding alcoholism.    She had hypokalemia which resolved with potassium repletion.  She was also noted to have hyponatremia which did improve with isotonic saline.    Patient was noted to have elevated blood pressure and was started on Cozaar and beta-blocker.  Blood pressure was stable on discharge.    She was continued on her outpatient medications for depressive disorder and diabetes mellitus and was continued on nicotine patch for nicotine dependence.      Condition on Discharge: Stable and improved.    Physical Exam on Discharge:  /80 (BP Location: Right arm, Patient Position: Lying)   Pulse 82   Temp 98.4 °F (36.9 °C) (Oral)   Resp 18   Ht 167.6 cm (66\")   Wt 86.6 kg (191 lb)   LMP  (LMP Unknown)   SpO2 96%   Breastfeeding No   BMI 30.83 kg/m²   Physical Exam  Vitals signs and nursing note " reviewed.   Constitutional:       General: She is not in acute distress.     Appearance: She is well-developed. She is not diaphoretic.   HENT:      Head: Normocephalic and atraumatic.      Right Ear: External ear normal.      Left Ear: External ear normal.      Nose: Nose normal.   Eyes:      Conjunctiva/sclera: Conjunctivae normal.      Pupils: Pupils are equal, round, and reactive to light.   Neck:      Musculoskeletal: Normal range of motion and neck supple.      Thyroid: No thyromegaly.      Vascular: No JVD.   Cardiovascular:      Rate and Rhythm: Normal rate and regular rhythm.      Heart sounds: Normal heart sounds. No murmur. No friction rub. No gallop.    Pulmonary:      Effort: Pulmonary effort is normal. No respiratory distress.      Breath sounds: Normal breath sounds. No stridor. No wheezing or rales.   Chest:      Chest wall: No tenderness.   Abdominal:      General: Bowel sounds are normal. There is no distension.      Palpations: Abdomen is soft. There is no mass.      Tenderness: There is no abdominal tenderness. There is no guarding or rebound.      Hernia: No hernia is present.   Musculoskeletal: Normal range of motion.         General: No tenderness or deformity.   Skin:     General: Skin is warm and dry.      Coloration: Skin is not jaundiced or pale.      Findings: No bruising, erythema, lesion or rash.   Neurological:      General: No focal deficit present.      Mental Status: She is alert and oriented to person, place, and time. Mental status is at baseline.      Cranial Nerves: No cranial nerve deficit.      Sensory: No sensory deficit.      Motor: No weakness.      Coordination: Coordination normal.      Gait: Gait normal.      Deep Tendon Reflexes: Reflexes are normal and symmetric. Reflexes normal.   Psychiatric:         Mood and Affect: Mood normal.         Behavior: Behavior normal. Behavior is cooperative.         Thought Content: Thought content normal.         Judgment: Judgment  normal.           Discharge Disposition:  Home or Self Care    Discharge Medications:     Discharge Medications      New Medications      Instructions Start Date   losartan 25 MG tablet  Commonly known as: COZAAR   75 mg, Oral, Every 24 Hours Scheduled      metoprolol tartrate 25 MG tablet  Commonly known as: LOPRESSOR   25 mg, Oral, Every 12 Hours Scheduled         Continue These Medications      Instructions Start Date   albuterol sulfate  (90 Base) MCG/ACT inhaler  Commonly known as: PROVENTIL HFA;VENTOLIN HFA;PROAIR HFA   2 puffs, Inhalation, 4 Times Daily - RT      escitalopram 20 MG tablet  Commonly known as: LEXAPRO   20 mg, Oral, Daily      insulin aspart 100 UNIT/ML injection  Commonly known as: novoLOG   0-9 Units, Subcutaneous, 4 Times Daily Before Meals & Nightly      insulin detemir 100 UNIT/ML injection  Commonly known as: LEVEMIR   10 Units, Subcutaneous, Nightly      Lancets 28G misc   1 each      metFORMIN 1000 MG tablet  Commonly known as: GLUCOPHAGE   1,000 mg, Oral, 2 Times Daily      nicotine 21 MG/24HR patch  Commonly known as: NICODERM CQ   1 patch, Transdermal, Every 24 Hours      omeprazole 40 MG capsule  Commonly known as: priLOSEC   TAKE ONE CAPSULE BY MOUTH DAILY      ONE TOUCH ULTRA 2 w/Device kit   No dose, route, or frequency recorded.      traZODone 50 MG tablet  Commonly known as: DESYREL   50 mg, Oral, Nightly PRN         Stop These Medications    naltrexone 50 MG tablet  Commonly known as: DEPADE            Discharge Diet: Cardiac and diabetic.    Activity at Discharge: As tolerated.    Discharge Care Plan/Instructions: Patient has been advised to take her medications as prescribed and to return to the emergency room the event of any worsening symptoms.    Follow-up Appointments: Patient currently does not have a primary care provider but will be set up with one.  She will be seen for follow-up as outpatient in 1 to 2 weeks.    Test Results Pending at Discharge:     Danial  DAISY Foster MD  01/06/21  10:29 CST    Time: 35 minutes.                Electronically signed by Jude Foster MD at 01/06/21 1113       Discharge Order (From admission, onward)     Start     Ordered    01/06/21 1028  Discharge patient  Once     Expected Discharge Date: 01/06/21    Discharge Disposition: Home or Self Care    Physician of Record for Attribution - Please select from Treatment Team: JUDE FOSTER [[id number]]    Review needed by CMO to determine Physician of Record: No       Question Answer Comment   Physician of Record for Attribution - Please select from Treatment Team JUDE FOSTER    Review needed by CMO to determine Physician of Record No        01/06/21 1029

## 2021-01-07 NOTE — OUTREACH NOTE
Call Center TCM Note      Responses   [facility] patient discharged from?  [place]   Does the patient have one of the following disease processes/diagnoses(primary or secondary)?  Other   TCM attempt successful?  No   Unsuccessful attempts  Attempt 3          Eli Díaz LPN    1/7/2021, 15:32 EST

## 2021-01-14 ENCOUNTER — READMISSION MANAGEMENT (OUTPATIENT)
Dept: CALL CENTER | Facility: HOSPITAL | Age: 41
End: 2021-01-14

## 2021-01-14 NOTE — OUTREACH NOTE
Medical Week 2 Survey      Responses   [facility] patient discharged from?  [place]   Does the patient have one of the following disease processes/diagnoses(primary or secondary)?  Other   Week 2 attempt successful?  No   Unsuccessful attempts  Attempt 1          Savanna Martin RN

## 2021-01-15 ENCOUNTER — READMISSION MANAGEMENT (OUTPATIENT)
Dept: CALL CENTER | Facility: HOSPITAL | Age: 41
End: 2021-01-15

## 2021-01-15 NOTE — OUTREACH NOTE
Medical Week 2 Survey      Responses   [facility] patient discharged from?  [place]   Does the patient have one of the following disease processes/diagnoses(primary or secondary)?  Other   Week 2 attempt successful?  No   Unsuccessful attempts  Attempt 2          Perla Hunter RN

## 2021-01-21 ENCOUNTER — HOSPITAL ENCOUNTER (INPATIENT)
Facility: HOSPITAL | Age: 41
LOS: 5 days | Discharge: HOME | End: 2021-01-26
Attending: STUDENT IN AN ORGANIZED HEALTH CARE EDUCATION/TRAINING PROGRAM | Admitting: FAMILY MEDICINE

## 2021-01-21 ENCOUNTER — READMISSION MANAGEMENT (OUTPATIENT)
Dept: CALL CENTER | Facility: HOSPITAL | Age: 41
End: 2021-01-21

## 2021-01-21 DIAGNOSIS — F10.20 ALCOHOL USE DISORDER, SEVERE, DEPENDENCE (HCC): Chronic | ICD-10-CM

## 2021-01-21 DIAGNOSIS — F41.9 ANXIETY: Chronic | ICD-10-CM

## 2021-01-21 DIAGNOSIS — F10.939 ALCOHOL WITHDRAWAL SYNDROME WITH COMPLICATION (HCC): Primary | ICD-10-CM

## 2021-01-21 DIAGNOSIS — E83.42 HYPOMAGNESEMIA: ICD-10-CM

## 2021-01-21 DIAGNOSIS — E87.6 HYPOKALEMIA: ICD-10-CM

## 2021-01-21 DIAGNOSIS — R25.1 TREMOR: ICD-10-CM

## 2021-01-21 LAB
ALBUMIN SERPL-MCNC: 4.4 G/DL (ref 3.5–5.2)
ALBUMIN/GLOB SERPL: 1.2 G/DL
ALP SERPL-CCNC: 75 U/L (ref 39–117)
ALT SERPL W P-5'-P-CCNC: 75 U/L (ref 1–33)
AMPHET+METHAMPHET UR QL: NEGATIVE
AMPHETAMINES UR QL: NEGATIVE
ANION GAP SERPL CALCULATED.3IONS-SCNC: 18 MMOL/L (ref 5–15)
AST SERPL-CCNC: 71 U/L (ref 1–32)
BACTERIA UR QL AUTO: ABNORMAL /HPF
BARBITURATES UR QL SCN: NEGATIVE
BASOPHILS # BLD AUTO: 0.02 10*3/MM3 (ref 0–0.2)
BASOPHILS NFR BLD AUTO: 0.4 % (ref 0–1.5)
BENZODIAZ UR QL SCN: NEGATIVE
BILIRUB SERPL-MCNC: 1.5 MG/DL (ref 0–1.2)
BILIRUB UR QL STRIP: ABNORMAL
BUN SERPL-MCNC: 13 MG/DL (ref 6–20)
BUN/CREAT SERPL: 13.4 (ref 7–25)
BUPRENORPHINE SERPL-MCNC: NEGATIVE NG/ML
CALCIUM SPEC-SCNC: 9.7 MG/DL (ref 8.6–10.5)
CANNABINOIDS SERPL QL: NEGATIVE
CHLORIDE SERPL-SCNC: 95 MMOL/L (ref 98–107)
CLARITY UR: ABNORMAL
CO2 SERPL-SCNC: 21 MMOL/L (ref 22–29)
COCAINE UR QL: NEGATIVE
COLOR UR: ABNORMAL
CREAT SERPL-MCNC: 0.97 MG/DL (ref 0.57–1)
DEPRECATED RDW RBC AUTO: 45 FL (ref 37–54)
EOSINOPHIL # BLD AUTO: 0 10*3/MM3 (ref 0–0.4)
EOSINOPHIL NFR BLD AUTO: 0 % (ref 0.3–6.2)
ERYTHROCYTE [DISTWIDTH] IN BLOOD BY AUTOMATED COUNT: 13.8 % (ref 12.3–15.4)
ETHANOL BLD-MCNC: <10 MG/DL (ref 0–10)
ETHANOL UR QL: <0.01 %
FLUAV RNA RESP QL NAA+PROBE: NOT DETECTED
FLUBV RNA RESP QL NAA+PROBE: NOT DETECTED
GFR SERPL CREATININE-BSD FRML MDRD: 63 ML/MIN/1.73
GLOBULIN UR ELPH-MCNC: 3.6 GM/DL
GLUCOSE BLDC GLUCOMTR-MCNC: 132 MG/DL (ref 70–130)
GLUCOSE BLDC GLUCOMTR-MCNC: 190 MG/DL (ref 70–130)
GLUCOSE SERPL-MCNC: 244 MG/DL (ref 65–99)
GLUCOSE UR STRIP-MCNC: ABNORMAL MG/DL
HCG SERPL QL: NEGATIVE
HCT VFR BLD AUTO: 38.7 % (ref 34–46.6)
HGB BLD-MCNC: 13.5 G/DL (ref 12–15.9)
HGB UR QL STRIP.AUTO: ABNORMAL
HOLD SPECIMEN: NORMAL
HYALINE CASTS UR QL AUTO: ABNORMAL /LPF
IMM GRANULOCYTES # BLD AUTO: 0.02 10*3/MM3 (ref 0–0.05)
IMM GRANULOCYTES NFR BLD AUTO: 0.4 % (ref 0–0.5)
KETONES UR QL STRIP: ABNORMAL
LEUKOCYTE ESTERASE UR QL STRIP.AUTO: ABNORMAL
LIPASE SERPL-CCNC: 20 U/L (ref 13–60)
LYMPHOCYTES # BLD AUTO: 0.87 10*3/MM3 (ref 0.7–3.1)
LYMPHOCYTES NFR BLD AUTO: 15.8 % (ref 19.6–45.3)
MAGNESIUM SERPL-MCNC: 1.4 MG/DL (ref 1.6–2.6)
MCH RBC QN AUTO: 31 PG (ref 26.6–33)
MCHC RBC AUTO-ENTMCNC: 34.9 G/DL (ref 31.5–35.7)
MCV RBC AUTO: 88.8 FL (ref 79–97)
METHADONE UR QL SCN: NEGATIVE
MONOCYTES # BLD AUTO: 0.59 10*3/MM3 (ref 0.1–0.9)
MONOCYTES NFR BLD AUTO: 10.7 % (ref 5–12)
MUCOUS THREADS URNS QL MICRO: ABNORMAL /HPF
NEUTROPHILS NFR BLD AUTO: 4.01 10*3/MM3 (ref 1.7–7)
NEUTROPHILS NFR BLD AUTO: 72.7 % (ref 42.7–76)
NITRITE UR QL STRIP: NEGATIVE
NRBC BLD AUTO-RTO: 0 /100 WBC (ref 0–0.2)
OPIATES UR QL: NEGATIVE
OXYCODONE UR QL SCN: NEGATIVE
PCP UR QL SCN: NEGATIVE
PH UR STRIP.AUTO: 5.5 [PH] (ref 5–9)
PLATELET # BLD AUTO: 332 10*3/MM3 (ref 140–450)
PMV BLD AUTO: 9.7 FL (ref 6–12)
POTASSIUM SERPL-SCNC: 3.1 MMOL/L (ref 3.5–5.2)
PROPOXYPH UR QL: NEGATIVE
PROT SERPL-MCNC: 8 G/DL (ref 6–8.5)
PROT UR QL STRIP: ABNORMAL
RBC # BLD AUTO: 4.36 10*6/MM3 (ref 3.77–5.28)
RBC # UR: ABNORMAL /HPF
REF LAB TEST METHOD: ABNORMAL
SARS-COV-2 RNA RESP QL NAA+PROBE: NOT DETECTED
SODIUM SERPL-SCNC: 134 MMOL/L (ref 136–145)
SP GR UR STRIP: 1.03 (ref 1–1.03)
SQUAMOUS #/AREA URNS HPF: ABNORMAL /HPF
TRICYCLICS UR QL SCN: NEGATIVE
UROBILINOGEN UR QL STRIP: ABNORMAL
WBC # BLD AUTO: 5.51 10*3/MM3 (ref 3.4–10.8)
WBC UR QL AUTO: ABNORMAL /HPF
WHOLE BLOOD HOLD SPECIMEN: NORMAL
WHOLE BLOOD HOLD SPECIMEN: NORMAL

## 2021-01-21 PROCEDURE — 87636 SARSCOV2 & INF A&B AMP PRB: CPT | Performed by: STUDENT IN AN ORGANIZED HEALTH CARE EDUCATION/TRAINING PROGRAM

## 2021-01-21 PROCEDURE — 80053 COMPREHEN METABOLIC PANEL: CPT | Performed by: STUDENT IN AN ORGANIZED HEALTH CARE EDUCATION/TRAINING PROGRAM

## 2021-01-21 PROCEDURE — 82962 GLUCOSE BLOOD TEST: CPT

## 2021-01-21 PROCEDURE — 80306 DRUG TEST PRSMV INSTRMNT: CPT | Performed by: STUDENT IN AN ORGANIZED HEALTH CARE EDUCATION/TRAINING PROGRAM

## 2021-01-21 PROCEDURE — 99284 EMERGENCY DEPT VISIT MOD MDM: CPT

## 2021-01-21 PROCEDURE — G0378 HOSPITAL OBSERVATION PER HR: HCPCS

## 2021-01-21 PROCEDURE — 25010000002 MAGNESIUM SULFATE 2 GM/50ML SOLUTION: Performed by: STUDENT IN AN ORGANIZED HEALTH CARE EDUCATION/TRAINING PROGRAM

## 2021-01-21 PROCEDURE — 25010000002 THIAMINE PER 100 MG: Performed by: STUDENT IN AN ORGANIZED HEALTH CARE EDUCATION/TRAINING PROGRAM

## 2021-01-21 PROCEDURE — 83735 ASSAY OF MAGNESIUM: CPT | Performed by: STUDENT IN AN ORGANIZED HEALTH CARE EDUCATION/TRAINING PROGRAM

## 2021-01-21 PROCEDURE — 83690 ASSAY OF LIPASE: CPT | Performed by: STUDENT IN AN ORGANIZED HEALTH CARE EDUCATION/TRAINING PROGRAM

## 2021-01-21 PROCEDURE — 25010000002 ENOXAPARIN PER 10 MG: Performed by: FAMILY MEDICINE

## 2021-01-21 PROCEDURE — 84703 CHORIONIC GONADOTROPIN ASSAY: CPT | Performed by: STUDENT IN AN ORGANIZED HEALTH CARE EDUCATION/TRAINING PROGRAM

## 2021-01-21 PROCEDURE — 81001 URINALYSIS AUTO W/SCOPE: CPT | Performed by: STUDENT IN AN ORGANIZED HEALTH CARE EDUCATION/TRAINING PROGRAM

## 2021-01-21 PROCEDURE — 82077 ASSAY SPEC XCP UR&BREATH IA: CPT | Performed by: STUDENT IN AN ORGANIZED HEALTH CARE EDUCATION/TRAINING PROGRAM

## 2021-01-21 PROCEDURE — 85025 COMPLETE CBC W/AUTO DIFF WBC: CPT | Performed by: EMERGENCY MEDICINE

## 2021-01-21 PROCEDURE — 25010000002 LORAZEPAM PER 2 MG: Performed by: STUDENT IN AN ORGANIZED HEALTH CARE EDUCATION/TRAINING PROGRAM

## 2021-01-21 PROCEDURE — 63710000001 INSULIN DETEMIR PER 5 UNITS: Performed by: FAMILY MEDICINE

## 2021-01-21 PROCEDURE — 25010000003 POTASSIUM CHLORIDE 10 MEQ/100ML SOLUTION: Performed by: STUDENT IN AN ORGANIZED HEALTH CARE EDUCATION/TRAINING PROGRAM

## 2021-01-21 PROCEDURE — 63710000001 INSULIN ASPART PER 5 UNITS: Performed by: FAMILY MEDICINE

## 2021-01-21 RX ORDER — LORAZEPAM 0.5 MG/1
1 TABLET ORAL
Status: DISCONTINUED | OUTPATIENT
Start: 2021-01-21 | End: 2021-01-26 | Stop reason: HOSPADM

## 2021-01-21 RX ORDER — MAGNESIUM SULFATE HEPTAHYDRATE 40 MG/ML
2 INJECTION, SOLUTION INTRAVENOUS ONCE
Status: COMPLETED | OUTPATIENT
Start: 2021-01-21 | End: 2021-01-21

## 2021-01-21 RX ORDER — DEXTROSE MONOHYDRATE 25 G/50ML
25 INJECTION, SOLUTION INTRAVENOUS
Status: DISCONTINUED | OUTPATIENT
Start: 2021-01-21 | End: 2021-01-21

## 2021-01-21 RX ORDER — CALCIUM CARBONATE 200(500)MG
2 TABLET,CHEWABLE ORAL 2 TIMES DAILY PRN
Status: DISCONTINUED | OUTPATIENT
Start: 2021-01-21 | End: 2021-01-26 | Stop reason: HOSPADM

## 2021-01-21 RX ORDER — MAGNESIUM SULFATE HEPTAHYDRATE 40 MG/ML
2 INJECTION, SOLUTION INTRAVENOUS AS NEEDED
Status: DISCONTINUED | OUTPATIENT
Start: 2021-01-21 | End: 2021-01-26 | Stop reason: HOSPADM

## 2021-01-21 RX ORDER — SODIUM CHLORIDE 0.9 % (FLUSH) 0.9 %
10 SYRINGE (ML) INJECTION AS NEEDED
Status: DISCONTINUED | OUTPATIENT
Start: 2021-01-21 | End: 2021-01-26 | Stop reason: HOSPADM

## 2021-01-21 RX ORDER — LORAZEPAM 2 MG/ML
1 INJECTION INTRAMUSCULAR
Status: DISCONTINUED | OUTPATIENT
Start: 2021-01-21 | End: 2021-01-26 | Stop reason: HOSPADM

## 2021-01-21 RX ORDER — LORAZEPAM 2 MG/ML
1 INJECTION INTRAMUSCULAR ONCE
Status: COMPLETED | OUTPATIENT
Start: 2021-01-21 | End: 2021-01-21

## 2021-01-21 RX ORDER — SODIUM CHLORIDE, SODIUM LACTATE, POTASSIUM CHLORIDE, CALCIUM CHLORIDE 600; 310; 30; 20 MG/100ML; MG/100ML; MG/100ML; MG/100ML
100 INJECTION, SOLUTION INTRAVENOUS CONTINUOUS
Status: DISCONTINUED | OUTPATIENT
Start: 2021-01-21 | End: 2021-01-24

## 2021-01-21 RX ORDER — ACETAMINOPHEN 325 MG/1
650 TABLET ORAL EVERY 4 HOURS PRN
Status: DISCONTINUED | OUTPATIENT
Start: 2021-01-21 | End: 2021-01-26 | Stop reason: HOSPADM

## 2021-01-21 RX ORDER — POTASSIUM CHLORIDE 1.5 G/1.77G
40 POWDER, FOR SOLUTION ORAL AS NEEDED
Status: DISCONTINUED | OUTPATIENT
Start: 2021-01-21 | End: 2021-01-26 | Stop reason: HOSPADM

## 2021-01-21 RX ORDER — SODIUM CHLORIDE, SODIUM LACTATE, POTASSIUM CHLORIDE, CALCIUM CHLORIDE 600; 310; 30; 20 MG/100ML; MG/100ML; MG/100ML; MG/100ML
INJECTION, SOLUTION INTRAVENOUS
Status: COMPLETED
Start: 2021-01-21 | End: 2021-01-21

## 2021-01-21 RX ORDER — ONDANSETRON 4 MG/1
4 TABLET, FILM COATED ORAL EVERY 6 HOURS PRN
Status: DISCONTINUED | OUTPATIENT
Start: 2021-01-21 | End: 2021-01-26 | Stop reason: HOSPADM

## 2021-01-21 RX ORDER — LORAZEPAM 2 MG/ML
2 INJECTION INTRAMUSCULAR
Status: DISCONTINUED | OUTPATIENT
Start: 2021-01-21 | End: 2021-01-26 | Stop reason: HOSPADM

## 2021-01-21 RX ORDER — LORAZEPAM 2 MG/ML
4 INJECTION INTRAMUSCULAR
Status: DISCONTINUED | OUTPATIENT
Start: 2021-01-21 | End: 2021-01-26 | Stop reason: HOSPADM

## 2021-01-21 RX ORDER — POTASSIUM CHLORIDE 750 MG/1
40 CAPSULE, EXTENDED RELEASE ORAL AS NEEDED
Status: DISCONTINUED | OUTPATIENT
Start: 2021-01-21 | End: 2021-01-26 | Stop reason: HOSPADM

## 2021-01-21 RX ORDER — ACETAMINOPHEN 160 MG/5ML
650 SOLUTION ORAL EVERY 4 HOURS PRN
Status: DISCONTINUED | OUTPATIENT
Start: 2021-01-21 | End: 2021-01-26 | Stop reason: HOSPADM

## 2021-01-21 RX ORDER — LORAZEPAM 2 MG/1
4 TABLET ORAL
Status: DISCONTINUED | OUTPATIENT
Start: 2021-01-21 | End: 2021-01-26 | Stop reason: HOSPADM

## 2021-01-21 RX ORDER — LORAZEPAM 0.5 MG/1
2 TABLET ORAL EVERY 6 HOURS
Status: COMPLETED | OUTPATIENT
Start: 2021-01-21 | End: 2021-01-22

## 2021-01-21 RX ORDER — MAGNESIUM SULFATE HEPTAHYDRATE 40 MG/ML
4 INJECTION, SOLUTION INTRAVENOUS AS NEEDED
Status: DISCONTINUED | OUTPATIENT
Start: 2021-01-21 | End: 2021-01-26 | Stop reason: HOSPADM

## 2021-01-21 RX ORDER — POTASSIUM CHLORIDE 7.45 MG/ML
10 INJECTION INTRAVENOUS
Status: DISCONTINUED | OUTPATIENT
Start: 2021-01-21 | End: 2021-01-26 | Stop reason: HOSPADM

## 2021-01-21 RX ORDER — ONDANSETRON 2 MG/ML
4 INJECTION INTRAMUSCULAR; INTRAVENOUS EVERY 6 HOURS PRN
Status: DISCONTINUED | OUTPATIENT
Start: 2021-01-21 | End: 2021-01-26 | Stop reason: HOSPADM

## 2021-01-21 RX ORDER — LORAZEPAM 2 MG/1
2 TABLET ORAL
Status: DISCONTINUED | OUTPATIENT
Start: 2021-01-21 | End: 2021-01-26 | Stop reason: HOSPADM

## 2021-01-21 RX ORDER — FOLIC ACID 1 MG/1
1 TABLET ORAL DAILY
Status: DISCONTINUED | OUTPATIENT
Start: 2021-01-22 | End: 2021-01-26 | Stop reason: HOSPADM

## 2021-01-21 RX ORDER — ACETAMINOPHEN 650 MG/1
650 SUPPOSITORY RECTAL EVERY 4 HOURS PRN
Status: DISCONTINUED | OUTPATIENT
Start: 2021-01-21 | End: 2021-01-26 | Stop reason: HOSPADM

## 2021-01-21 RX ORDER — LANOLIN ALCOHOL/MO/W.PET/CERES
6 CREAM (GRAM) TOPICAL NIGHTLY PRN
Status: DISCONTINUED | OUTPATIENT
Start: 2021-01-21 | End: 2021-01-26 | Stop reason: HOSPADM

## 2021-01-21 RX ORDER — SODIUM CHLORIDE 0.9 % (FLUSH) 0.9 %
10 SYRINGE (ML) INJECTION EVERY 12 HOURS SCHEDULED
Status: DISCONTINUED | OUTPATIENT
Start: 2021-01-21 | End: 2021-01-26 | Stop reason: HOSPADM

## 2021-01-21 RX ORDER — NICOTINE POLACRILEX 4 MG
15 LOZENGE BUCCAL
Status: DISCONTINUED | OUTPATIENT
Start: 2021-01-21 | End: 2021-01-21 | Stop reason: SDUPTHER

## 2021-01-21 RX ORDER — POTASSIUM CHLORIDE 7.45 MG/ML
10 INJECTION INTRAVENOUS ONCE
Status: COMPLETED | OUTPATIENT
Start: 2021-01-21 | End: 2021-01-21

## 2021-01-21 RX ORDER — NICOTINE POLACRILEX 4 MG
15 LOZENGE BUCCAL
Status: DISCONTINUED | OUTPATIENT
Start: 2021-01-21 | End: 2021-01-26 | Stop reason: HOSPADM

## 2021-01-21 RX ORDER — DEXTROSE MONOHYDRATE 25 G/50ML
25 INJECTION, SOLUTION INTRAVENOUS
Status: DISCONTINUED | OUTPATIENT
Start: 2021-01-21 | End: 2021-01-26 | Stop reason: HOSPADM

## 2021-01-21 RX ORDER — CITALOPRAM 40 MG/1
40 TABLET ORAL DAILY
Status: DISCONTINUED | OUTPATIENT
Start: 2021-01-22 | End: 2021-01-26 | Stop reason: HOSPADM

## 2021-01-21 RX ORDER — PANTOPRAZOLE SODIUM 40 MG/1
40 TABLET, DELAYED RELEASE ORAL EVERY MORNING
Status: DISCONTINUED | OUTPATIENT
Start: 2021-01-22 | End: 2021-01-26 | Stop reason: HOSPADM

## 2021-01-21 RX ORDER — DIPHENOXYLATE HYDROCHLORIDE AND ATROPINE SULFATE 2.5; .025 MG/1; MG/1
1 TABLET ORAL DAILY
Status: DISCONTINUED | OUTPATIENT
Start: 2021-01-22 | End: 2021-01-26 | Stop reason: HOSPADM

## 2021-01-21 RX ORDER — ALBUTEROL SULFATE 2.5 MG/3ML
2.5 SOLUTION RESPIRATORY (INHALATION) EVERY 6 HOURS PRN
Status: DISCONTINUED | OUTPATIENT
Start: 2021-01-21 | End: 2021-01-26 | Stop reason: HOSPADM

## 2021-01-21 RX ORDER — LORAZEPAM 0.5 MG/1
1 TABLET ORAL EVERY 8 HOURS
Status: DISPENSED | OUTPATIENT
Start: 2021-01-22 | End: 2021-01-23

## 2021-01-21 RX ADMIN — MAGNESIUM SULFATE HEPTAHYDRATE 2 G: 40 INJECTION, SOLUTION INTRAVENOUS at 14:55

## 2021-01-21 RX ADMIN — INSULIN ASPART 2 UNITS: 100 INJECTION, SOLUTION INTRAVENOUS; SUBCUTANEOUS at 17:10

## 2021-01-21 RX ADMIN — LORAZEPAM 1 MG: 2 INJECTION INTRAMUSCULAR; INTRAVENOUS at 14:16

## 2021-01-21 RX ADMIN — Medication 10 ML: at 15:24

## 2021-01-21 RX ADMIN — LORAZEPAM 2 MG: 0.5 TABLET ORAL at 23:59

## 2021-01-21 RX ADMIN — SODIUM CHLORIDE, POTASSIUM CHLORIDE, SODIUM LACTATE AND CALCIUM CHLORIDE 1000 ML: 600; 310; 30; 20 INJECTION, SOLUTION INTRAVENOUS at 17:11

## 2021-01-21 RX ADMIN — POTASSIUM CHLORIDE 10 MEQ: 10 INJECTION, SOLUTION INTRAVENOUS at 15:29

## 2021-01-21 RX ADMIN — THIAMINE HYDROCHLORIDE 1000 ML/HR: 100 INJECTION, SOLUTION INTRAMUSCULAR; INTRAVENOUS at 14:16

## 2021-01-21 RX ADMIN — LORAZEPAM 2 MG: 0.5 TABLET ORAL at 17:33

## 2021-01-21 RX ADMIN — LOSARTAN POTASSIUM 75 MG: 50 TABLET, FILM COATED ORAL at 17:52

## 2021-01-21 RX ADMIN — INSULIN DETEMIR 10 UNITS: 100 INJECTION, SOLUTION SUBCUTANEOUS at 21:57

## 2021-01-21 RX ADMIN — ENOXAPARIN SODIUM 40 MG: 40 INJECTION SUBCUTANEOUS at 17:53

## 2021-01-21 NOTE — OUTREACH NOTE
Medical Week 3 Survey      Responses   [facility] patient discharged from?  [place]   Does the patient have one of the following disease processes/diagnoses(primary or secondary)?  Other   Week 3 attempt successful?  No   Revoke  Readmitted          Franchesca Moulton RN

## 2021-01-22 LAB
ALBUMIN SERPL-MCNC: 3.6 G/DL (ref 3.5–5.2)
ALBUMIN/GLOB SERPL: 1.2 G/DL
ALP SERPL-CCNC: 62 U/L (ref 39–117)
ALT SERPL W P-5'-P-CCNC: 48 U/L (ref 1–33)
ANION GAP SERPL CALCULATED.3IONS-SCNC: 8 MMOL/L (ref 5–15)
AST SERPL-CCNC: 40 U/L (ref 1–32)
BASOPHILS # BLD AUTO: 0.02 10*3/MM3 (ref 0–0.2)
BASOPHILS NFR BLD AUTO: 0.4 % (ref 0–1.5)
BILIRUB SERPL-MCNC: 1 MG/DL (ref 0–1.2)
BUN SERPL-MCNC: 8 MG/DL (ref 6–20)
BUN/CREAT SERPL: 10.1 (ref 7–25)
CALCIUM SPEC-SCNC: 8.7 MG/DL (ref 8.6–10.5)
CHLORIDE SERPL-SCNC: 104 MMOL/L (ref 98–107)
CO2 SERPL-SCNC: 26 MMOL/L (ref 22–29)
CREAT SERPL-MCNC: 0.79 MG/DL (ref 0.57–1)
DEPRECATED RDW RBC AUTO: 46 FL (ref 37–54)
EOSINOPHIL # BLD AUTO: 0.03 10*3/MM3 (ref 0–0.4)
EOSINOPHIL NFR BLD AUTO: 0.6 % (ref 0.3–6.2)
ERYTHROCYTE [DISTWIDTH] IN BLOOD BY AUTOMATED COUNT: 13.7 % (ref 12.3–15.4)
GFR SERPL CREATININE-BSD FRML MDRD: 80 ML/MIN/1.73
GLOBULIN UR ELPH-MCNC: 3.1 GM/DL
GLUCOSE BLDC GLUCOMTR-MCNC: 121 MG/DL (ref 70–130)
GLUCOSE BLDC GLUCOMTR-MCNC: 164 MG/DL (ref 70–130)
GLUCOSE SERPL-MCNC: 122 MG/DL (ref 65–99)
HCT VFR BLD AUTO: 36.1 % (ref 34–46.6)
HGB BLD-MCNC: 12.2 G/DL (ref 12–15.9)
IMM GRANULOCYTES # BLD AUTO: 0 10*3/MM3 (ref 0–0.05)
IMM GRANULOCYTES NFR BLD AUTO: 0 % (ref 0–0.5)
LYMPHOCYTES # BLD AUTO: 1.86 10*3/MM3 (ref 0.7–3.1)
LYMPHOCYTES NFR BLD AUTO: 38.5 % (ref 19.6–45.3)
MCH RBC QN AUTO: 30.5 PG (ref 26.6–33)
MCHC RBC AUTO-ENTMCNC: 33.8 G/DL (ref 31.5–35.7)
MCV RBC AUTO: 90.3 FL (ref 79–97)
MONOCYTES # BLD AUTO: 0.6 10*3/MM3 (ref 0.1–0.9)
MONOCYTES NFR BLD AUTO: 12.4 % (ref 5–12)
NEUTROPHILS NFR BLD AUTO: 2.32 10*3/MM3 (ref 1.7–7)
NEUTROPHILS NFR BLD AUTO: 48.1 % (ref 42.7–76)
NRBC BLD AUTO-RTO: 0 /100 WBC (ref 0–0.2)
PLATELET # BLD AUTO: 272 10*3/MM3 (ref 140–450)
PMV BLD AUTO: 9.8 FL (ref 6–12)
POTASSIUM SERPL-SCNC: 3.1 MMOL/L (ref 3.5–5.2)
POTASSIUM SERPL-SCNC: 3.5 MMOL/L (ref 3.5–5.2)
PROT SERPL-MCNC: 6.7 G/DL (ref 6–8.5)
RBC # BLD AUTO: 4 10*6/MM3 (ref 3.77–5.28)
SODIUM SERPL-SCNC: 138 MMOL/L (ref 136–145)
WBC # BLD AUTO: 4.83 10*3/MM3 (ref 3.4–10.8)

## 2021-01-22 PROCEDURE — 25010000002 LORAZEPAM PER 2 MG: Performed by: FAMILY MEDICINE

## 2021-01-22 PROCEDURE — 63710000001 INSULIN DETEMIR PER 5 UNITS: Performed by: FAMILY MEDICINE

## 2021-01-22 PROCEDURE — 84132 ASSAY OF SERUM POTASSIUM: CPT | Performed by: FAMILY MEDICINE

## 2021-01-22 PROCEDURE — 80053 COMPREHEN METABOLIC PANEL: CPT | Performed by: FAMILY MEDICINE

## 2021-01-22 PROCEDURE — 63710000001 INSULIN ASPART PER 5 UNITS: Performed by: FAMILY MEDICINE

## 2021-01-22 PROCEDURE — 85025 COMPLETE CBC W/AUTO DIFF WBC: CPT | Performed by: FAMILY MEDICINE

## 2021-01-22 PROCEDURE — 25010000002 ENOXAPARIN PER 10 MG: Performed by: FAMILY MEDICINE

## 2021-01-22 PROCEDURE — 99223 1ST HOSP IP/OBS HIGH 75: CPT | Performed by: PSYCHIATRY & NEUROLOGY

## 2021-01-22 PROCEDURE — 82962 GLUCOSE BLOOD TEST: CPT

## 2021-01-22 PROCEDURE — G0378 HOSPITAL OBSERVATION PER HR: HCPCS

## 2021-01-22 RX ORDER — NALTREXONE HYDROCHLORIDE 50 MG/1
50 TABLET, FILM COATED ORAL DAILY
Status: DISCONTINUED | OUTPATIENT
Start: 2021-01-22 | End: 2021-01-26 | Stop reason: HOSPADM

## 2021-01-22 RX ORDER — NALTREXONE HYDROCHLORIDE 50 MG/1
50 TABLET, FILM COATED ORAL DAILY
Qty: 30 TABLET | Refills: 0 | Status: CANCELLED | OUTPATIENT
Start: 2021-01-22

## 2021-01-22 RX ADMIN — INSULIN ASPART 3 UNITS: 100 INJECTION, SOLUTION INTRAVENOUS; SUBCUTANEOUS at 11:24

## 2021-01-22 RX ADMIN — SODIUM CHLORIDE, PRESERVATIVE FREE 10 ML: 5 INJECTION INTRAVENOUS at 21:02

## 2021-01-22 RX ADMIN — FOLIC ACID 1 MG: 1 TABLET ORAL at 08:47

## 2021-01-22 RX ADMIN — ENOXAPARIN SODIUM 40 MG: 40 INJECTION SUBCUTANEOUS at 16:30

## 2021-01-22 RX ADMIN — MELATONIN 6 MG: at 21:00

## 2021-01-22 RX ADMIN — THERA TABS 1 TABLET: TAB at 08:47

## 2021-01-22 RX ADMIN — INSULIN DETEMIR 10 UNITS: 100 INJECTION, SOLUTION SUBCUTANEOUS at 20:54

## 2021-01-22 RX ADMIN — SODIUM CHLORIDE, POTASSIUM CHLORIDE, SODIUM LACTATE AND CALCIUM CHLORIDE 100 ML/HR: 600; 310; 30; 20 INJECTION, SOLUTION INTRAVENOUS at 15:13

## 2021-01-22 RX ADMIN — CITALOPRAM HYDROBROMIDE 40 MG: 40 TABLET ORAL at 08:47

## 2021-01-22 RX ADMIN — POTASSIUM CHLORIDE 40 MEQ: 10 CAPSULE, COATED, EXTENDED RELEASE ORAL at 08:53

## 2021-01-22 RX ADMIN — LORAZEPAM 2 MG: 0.5 TABLET ORAL at 06:18

## 2021-01-22 RX ADMIN — NALTREXONE HYDROCHLORIDE 50 MG: 50 TABLET, FILM COATED ORAL at 16:30

## 2021-01-22 RX ADMIN — POTASSIUM CHLORIDE 40 MEQ: 1.5 POWDER, FOR SOLUTION ORAL at 20:53

## 2021-01-22 RX ADMIN — POTASSIUM CHLORIDE 40 MEQ: 10 CAPSULE, COATED, EXTENDED RELEASE ORAL at 16:30

## 2021-01-22 RX ADMIN — LORAZEPAM 2 MG: 0.5 TABLET ORAL at 11:23

## 2021-01-22 RX ADMIN — SODIUM CHLORIDE, PRESERVATIVE FREE 10 ML: 5 INJECTION INTRAVENOUS at 08:47

## 2021-01-22 RX ADMIN — POTASSIUM CHLORIDE 40 MEQ: 10 CAPSULE, COATED, EXTENDED RELEASE ORAL at 13:05

## 2021-01-22 RX ADMIN — INSULIN ASPART 2 UNITS: 100 INJECTION, SOLUTION INTRAVENOUS; SUBCUTANEOUS at 18:42

## 2021-01-22 RX ADMIN — LOSARTAN POTASSIUM 75 MG: 50 TABLET, FILM COATED ORAL at 08:47

## 2021-01-22 RX ADMIN — LORAZEPAM 1 MG: 2 INJECTION INTRAMUSCULAR; INTRAVENOUS at 21:01

## 2021-01-22 RX ADMIN — LORAZEPAM 1 MG: 0.5 TABLET ORAL at 16:30

## 2021-01-22 RX ADMIN — SODIUM CHLORIDE, POTASSIUM CHLORIDE, SODIUM LACTATE AND CALCIUM CHLORIDE 100 ML/HR: 600; 310; 30; 20 INJECTION, SOLUTION INTRAVENOUS at 03:39

## 2021-01-22 RX ADMIN — PANTOPRAZOLE SODIUM 40 MG: 40 TABLET, DELAYED RELEASE ORAL at 06:18

## 2021-01-22 RX ADMIN — THIAMINE HCL TAB 100 MG 100 MG: 100 TAB at 08:47

## 2021-01-23 LAB
ALBUMIN SERPL-MCNC: 3.5 G/DL (ref 3.5–5.2)
ALBUMIN/GLOB SERPL: 1.2 G/DL
ALP SERPL-CCNC: 60 U/L (ref 39–117)
ALT SERPL W P-5'-P-CCNC: 39 U/L (ref 1–33)
ANION GAP SERPL CALCULATED.3IONS-SCNC: 8 MMOL/L (ref 5–15)
AST SERPL-CCNC: 33 U/L (ref 1–32)
BASOPHILS # BLD AUTO: 0.01 10*3/MM3 (ref 0–0.2)
BASOPHILS NFR BLD AUTO: 0.2 % (ref 0–1.5)
BILIRUB SERPL-MCNC: 0.7 MG/DL (ref 0–1.2)
BUN SERPL-MCNC: 8 MG/DL (ref 6–20)
BUN/CREAT SERPL: 10.5 (ref 7–25)
CALCIUM SPEC-SCNC: 9.1 MG/DL (ref 8.6–10.5)
CHLORIDE SERPL-SCNC: 104 MMOL/L (ref 98–107)
CO2 SERPL-SCNC: 25 MMOL/L (ref 22–29)
CREAT SERPL-MCNC: 0.76 MG/DL (ref 0.57–1)
DEPRECATED RDW RBC AUTO: 45.3 FL (ref 37–54)
EOSINOPHIL # BLD AUTO: 0.11 10*3/MM3 (ref 0–0.4)
EOSINOPHIL NFR BLD AUTO: 2.6 % (ref 0.3–6.2)
ERYTHROCYTE [DISTWIDTH] IN BLOOD BY AUTOMATED COUNT: 13.6 % (ref 12.3–15.4)
GFR SERPL CREATININE-BSD FRML MDRD: 83 ML/MIN/1.73
GLOBULIN UR ELPH-MCNC: 3 GM/DL
GLUCOSE BLDC GLUCOMTR-MCNC: 160 MG/DL (ref 70–130)
GLUCOSE BLDC GLUCOMTR-MCNC: 182 MG/DL (ref 70–130)
GLUCOSE SERPL-MCNC: 111 MG/DL (ref 65–99)
HCT VFR BLD AUTO: 34.6 % (ref 34–46.6)
HGB BLD-MCNC: 11.9 G/DL (ref 12–15.9)
IMM GRANULOCYTES # BLD AUTO: 0.01 10*3/MM3 (ref 0–0.05)
IMM GRANULOCYTES NFR BLD AUTO: 0.2 % (ref 0–0.5)
LYMPHOCYTES # BLD AUTO: 1.14 10*3/MM3 (ref 0.7–3.1)
LYMPHOCYTES NFR BLD AUTO: 26.8 % (ref 19.6–45.3)
MCH RBC QN AUTO: 31.2 PG (ref 26.6–33)
MCHC RBC AUTO-ENTMCNC: 34.4 G/DL (ref 31.5–35.7)
MCV RBC AUTO: 90.6 FL (ref 79–97)
MONOCYTES # BLD AUTO: 0.47 10*3/MM3 (ref 0.1–0.9)
MONOCYTES NFR BLD AUTO: 11 % (ref 5–12)
NEUTROPHILS NFR BLD AUTO: 2.52 10*3/MM3 (ref 1.7–7)
NEUTROPHILS NFR BLD AUTO: 59.2 % (ref 42.7–76)
NRBC BLD AUTO-RTO: 0 /100 WBC (ref 0–0.2)
PLATELET # BLD AUTO: 242 10*3/MM3 (ref 140–450)
PMV BLD AUTO: 9.7 FL (ref 6–12)
POTASSIUM SERPL-SCNC: 3.9 MMOL/L (ref 3.5–5.2)
PROT SERPL-MCNC: 6.5 G/DL (ref 6–8.5)
RBC # BLD AUTO: 3.82 10*6/MM3 (ref 3.77–5.28)
SODIUM SERPL-SCNC: 137 MMOL/L (ref 136–145)
WBC # BLD AUTO: 4.26 10*3/MM3 (ref 3.4–10.8)

## 2021-01-23 PROCEDURE — 25010000002 ENOXAPARIN PER 10 MG: Performed by: FAMILY MEDICINE

## 2021-01-23 PROCEDURE — 25010000002 HYDRALAZINE PER 20 MG: Performed by: INTERNAL MEDICINE

## 2021-01-23 PROCEDURE — 85025 COMPLETE CBC W/AUTO DIFF WBC: CPT | Performed by: FAMILY MEDICINE

## 2021-01-23 PROCEDURE — 80053 COMPREHEN METABOLIC PANEL: CPT | Performed by: FAMILY MEDICINE

## 2021-01-23 PROCEDURE — 63710000001 INSULIN DETEMIR PER 5 UNITS: Performed by: FAMILY MEDICINE

## 2021-01-23 PROCEDURE — 63710000001 INSULIN ASPART PER 5 UNITS: Performed by: FAMILY MEDICINE

## 2021-01-23 PROCEDURE — 25010000002 LORAZEPAM PER 2 MG: Performed by: FAMILY MEDICINE

## 2021-01-23 PROCEDURE — 82962 GLUCOSE BLOOD TEST: CPT

## 2021-01-23 PROCEDURE — 25010000002 ONDANSETRON PER 1 MG: Performed by: FAMILY MEDICINE

## 2021-01-23 RX ORDER — LORAZEPAM 0.5 MG/1
2 TABLET ORAL EVERY 4 HOURS
Status: DISPENSED | OUTPATIENT
Start: 2021-01-23 | End: 2021-01-24

## 2021-01-23 RX ORDER — LORAZEPAM 2 MG/1
2 TABLET ORAL EVERY 6 HOURS
Status: DISPENSED | OUTPATIENT
Start: 2021-01-24 | End: 2021-01-25

## 2021-01-23 RX ORDER — HYDRALAZINE HYDROCHLORIDE 20 MG/ML
20 INJECTION INTRAMUSCULAR; INTRAVENOUS EVERY 6 HOURS PRN
Status: DISCONTINUED | OUTPATIENT
Start: 2021-01-23 | End: 2021-01-26 | Stop reason: HOSPADM

## 2021-01-23 RX ORDER — LORAZEPAM 2 MG/1
2 TABLET ORAL EVERY 8 HOURS
Status: COMPLETED | OUTPATIENT
Start: 2021-01-25 | End: 2021-01-26

## 2021-01-23 RX ORDER — CLONIDINE HYDROCHLORIDE 0.1 MG/1
0.1 TABLET ORAL EVERY 6 HOURS PRN
Status: DISCONTINUED | OUTPATIENT
Start: 2021-01-23 | End: 2021-01-26 | Stop reason: HOSPADM

## 2021-01-23 RX ORDER — AMLODIPINE BESYLATE 5 MG/1
5 TABLET ORAL
Status: DISCONTINUED | OUTPATIENT
Start: 2021-01-23 | End: 2021-01-26 | Stop reason: HOSPADM

## 2021-01-23 RX ADMIN — LORAZEPAM 1 MG: 2 INJECTION INTRAMUSCULAR; INTRAVENOUS at 20:39

## 2021-01-23 RX ADMIN — SODIUM CHLORIDE, PRESERVATIVE FREE 10 ML: 5 INJECTION INTRAVENOUS at 20:30

## 2021-01-23 RX ADMIN — NALTREXONE HYDROCHLORIDE 50 MG: 50 TABLET, FILM COATED ORAL at 09:00

## 2021-01-23 RX ADMIN — LORAZEPAM 2 MG: 0.5 TABLET ORAL at 18:23

## 2021-01-23 RX ADMIN — PANTOPRAZOLE SODIUM 40 MG: 40 TABLET, DELAYED RELEASE ORAL at 09:00

## 2021-01-23 RX ADMIN — LORAZEPAM 2 MG: 0.5 TABLET ORAL at 10:22

## 2021-01-23 RX ADMIN — SODIUM CHLORIDE, POTASSIUM CHLORIDE, SODIUM LACTATE AND CALCIUM CHLORIDE 100 ML/HR: 600; 310; 30; 20 INJECTION, SOLUTION INTRAVENOUS at 12:49

## 2021-01-23 RX ADMIN — FOLIC ACID 1 MG: 1 TABLET ORAL at 09:00

## 2021-01-23 RX ADMIN — ENOXAPARIN SODIUM 40 MG: 40 INJECTION SUBCUTANEOUS at 18:23

## 2021-01-23 RX ADMIN — THIAMINE HCL TAB 100 MG 100 MG: 100 TAB at 09:00

## 2021-01-23 RX ADMIN — LOSARTAN POTASSIUM 75 MG: 50 TABLET, FILM COATED ORAL at 09:00

## 2021-01-23 RX ADMIN — ONDANSETRON 4 MG: 2 INJECTION INTRAMUSCULAR; INTRAVENOUS at 09:34

## 2021-01-23 RX ADMIN — INSULIN ASPART 2 UNITS: 100 INJECTION, SOLUTION INTRAVENOUS; SUBCUTANEOUS at 12:20

## 2021-01-23 RX ADMIN — LORAZEPAM 1 MG: 0.5 TABLET ORAL at 09:00

## 2021-01-23 RX ADMIN — INSULIN DETEMIR 10 UNITS: 100 INJECTION, SOLUTION SUBCUTANEOUS at 20:37

## 2021-01-23 RX ADMIN — THERA TABS 1 TABLET: TAB at 09:00

## 2021-01-23 RX ADMIN — AMLODIPINE BESYLATE 5 MG: 5 TABLET ORAL at 10:22

## 2021-01-23 RX ADMIN — HYDRALAZINE HYDROCHLORIDE 20 MG: 20 INJECTION INTRAMUSCULAR; INTRAVENOUS at 07:43

## 2021-01-23 RX ADMIN — MELATONIN 6 MG: at 20:37

## 2021-01-23 RX ADMIN — INSULIN ASPART 2 UNITS: 100 INJECTION, SOLUTION INTRAVENOUS; SUBCUTANEOUS at 18:23

## 2021-01-23 RX ADMIN — SODIUM CHLORIDE, POTASSIUM CHLORIDE, SODIUM LACTATE AND CALCIUM CHLORIDE 100 ML/HR: 600; 310; 30; 20 INJECTION, SOLUTION INTRAVENOUS at 01:16

## 2021-01-23 RX ADMIN — CITALOPRAM HYDROBROMIDE 40 MG: 40 TABLET ORAL at 08:59

## 2021-01-24 LAB
ALBUMIN SERPL-MCNC: 3.6 G/DL (ref 3.5–5.2)
ALBUMIN/GLOB SERPL: 1.2 G/DL
ALP SERPL-CCNC: 60 U/L (ref 39–117)
ALT SERPL W P-5'-P-CCNC: 36 U/L (ref 1–33)
ANION GAP SERPL CALCULATED.3IONS-SCNC: 8 MMOL/L (ref 5–15)
AST SERPL-CCNC: 29 U/L (ref 1–32)
BASOPHILS # BLD AUTO: 0.01 10*3/MM3 (ref 0–0.2)
BASOPHILS NFR BLD AUTO: 0.2 % (ref 0–1.5)
BILIRUB SERPL-MCNC: 0.6 MG/DL (ref 0–1.2)
BUN SERPL-MCNC: 11 MG/DL (ref 6–20)
BUN/CREAT SERPL: 13.8 (ref 7–25)
CALCIUM SPEC-SCNC: 9.4 MG/DL (ref 8.6–10.5)
CHLORIDE SERPL-SCNC: 99 MMOL/L (ref 98–107)
CO2 SERPL-SCNC: 25 MMOL/L (ref 22–29)
CREAT SERPL-MCNC: 0.8 MG/DL (ref 0.57–1)
DEPRECATED RDW RBC AUTO: 45.1 FL (ref 37–54)
EOSINOPHIL # BLD AUTO: 0.13 10*3/MM3 (ref 0–0.4)
EOSINOPHIL NFR BLD AUTO: 2.4 % (ref 0.3–6.2)
ERYTHROCYTE [DISTWIDTH] IN BLOOD BY AUTOMATED COUNT: 13.5 % (ref 12.3–15.4)
GFR SERPL CREATININE-BSD FRML MDRD: 79 ML/MIN/1.73
GLOBULIN UR ELPH-MCNC: 3.1 GM/DL
GLUCOSE BLDC GLUCOMTR-MCNC: 143 MG/DL (ref 70–130)
GLUCOSE BLDC GLUCOMTR-MCNC: 161 MG/DL (ref 70–130)
GLUCOSE BLDC GLUCOMTR-MCNC: 202 MG/DL (ref 70–130)
GLUCOSE SERPL-MCNC: 119 MG/DL (ref 65–99)
HCT VFR BLD AUTO: 35.2 % (ref 34–46.6)
HGB BLD-MCNC: 12.1 G/DL (ref 12–15.9)
IMM GRANULOCYTES # BLD AUTO: 0.01 10*3/MM3 (ref 0–0.05)
IMM GRANULOCYTES NFR BLD AUTO: 0.2 % (ref 0–0.5)
LYMPHOCYTES # BLD AUTO: 1.37 10*3/MM3 (ref 0.7–3.1)
LYMPHOCYTES NFR BLD AUTO: 24.8 % (ref 19.6–45.3)
MCH RBC QN AUTO: 31.1 PG (ref 26.6–33)
MCHC RBC AUTO-ENTMCNC: 34.4 G/DL (ref 31.5–35.7)
MCV RBC AUTO: 90.5 FL (ref 79–97)
MONOCYTES # BLD AUTO: 0.57 10*3/MM3 (ref 0.1–0.9)
MONOCYTES NFR BLD AUTO: 10.3 % (ref 5–12)
NEUTROPHILS NFR BLD AUTO: 3.44 10*3/MM3 (ref 1.7–7)
NEUTROPHILS NFR BLD AUTO: 62.1 % (ref 42.7–76)
NRBC BLD AUTO-RTO: 0 /100 WBC (ref 0–0.2)
PLATELET # BLD AUTO: 264 10*3/MM3 (ref 140–450)
PMV BLD AUTO: 10.1 FL (ref 6–12)
POTASSIUM SERPL-SCNC: 3.7 MMOL/L (ref 3.5–5.2)
PROT SERPL-MCNC: 6.7 G/DL (ref 6–8.5)
RBC # BLD AUTO: 3.89 10*6/MM3 (ref 3.77–5.28)
SODIUM SERPL-SCNC: 132 MMOL/L (ref 136–145)
WBC # BLD AUTO: 5.53 10*3/MM3 (ref 3.4–10.8)

## 2021-01-24 PROCEDURE — 82962 GLUCOSE BLOOD TEST: CPT

## 2021-01-24 PROCEDURE — 63710000001 INSULIN ASPART PER 5 UNITS: Performed by: FAMILY MEDICINE

## 2021-01-24 PROCEDURE — 85025 COMPLETE CBC W/AUTO DIFF WBC: CPT | Performed by: FAMILY MEDICINE

## 2021-01-24 PROCEDURE — 80053 COMPREHEN METABOLIC PANEL: CPT | Performed by: FAMILY MEDICINE

## 2021-01-24 PROCEDURE — 99232 SBSQ HOSP IP/OBS MODERATE 35: CPT | Performed by: PSYCHIATRY & NEUROLOGY

## 2021-01-24 PROCEDURE — 63710000001 INSULIN DETEMIR PER 5 UNITS: Performed by: FAMILY MEDICINE

## 2021-01-24 RX ADMIN — LORAZEPAM 2 MG: 2 TABLET ORAL at 17:31

## 2021-01-24 RX ADMIN — AMLODIPINE BESYLATE 5 MG: 5 TABLET ORAL at 09:16

## 2021-01-24 RX ADMIN — CITALOPRAM HYDROBROMIDE 40 MG: 40 TABLET ORAL at 09:17

## 2021-01-24 RX ADMIN — INSULIN ASPART 3 UNITS: 100 INJECTION, SOLUTION INTRAVENOUS; SUBCUTANEOUS at 11:53

## 2021-01-24 RX ADMIN — THERA TABS 1 TABLET: TAB at 09:17

## 2021-01-24 RX ADMIN — LORAZEPAM 2 MG: 2 TABLET ORAL at 21:02

## 2021-01-24 RX ADMIN — INSULIN DETEMIR 10 UNITS: 100 INJECTION, SOLUTION SUBCUTANEOUS at 21:02

## 2021-01-24 RX ADMIN — PANTOPRAZOLE SODIUM 40 MG: 40 TABLET, DELAYED RELEASE ORAL at 09:17

## 2021-01-24 RX ADMIN — NALTREXONE HYDROCHLORIDE 50 MG: 50 TABLET, FILM COATED ORAL at 09:15

## 2021-01-24 RX ADMIN — THIAMINE HCL TAB 100 MG 100 MG: 100 TAB at 09:15

## 2021-01-24 RX ADMIN — SODIUM CHLORIDE, PRESERVATIVE FREE 10 ML: 5 INJECTION INTRAVENOUS at 09:17

## 2021-01-24 RX ADMIN — LORAZEPAM 2 MG: 0.5 TABLET ORAL at 09:17

## 2021-01-24 RX ADMIN — LORAZEPAM 2 MG: 0.5 TABLET ORAL at 00:40

## 2021-01-24 RX ADMIN — INSULIN ASPART 2 UNITS: 100 INJECTION, SOLUTION INTRAVENOUS; SUBCUTANEOUS at 17:07

## 2021-01-24 RX ADMIN — MELATONIN 6 MG: at 21:02

## 2021-01-24 RX ADMIN — LOSARTAN POTASSIUM 75 MG: 50 TABLET, FILM COATED ORAL at 09:15

## 2021-01-24 RX ADMIN — FOLIC ACID 1 MG: 1 TABLET ORAL at 09:16

## 2021-01-25 LAB
ALBUMIN SERPL-MCNC: 4.1 G/DL (ref 3.5–5.2)
ALBUMIN/GLOB SERPL: 1.2 G/DL
ALP SERPL-CCNC: 67 U/L (ref 39–117)
ALT SERPL W P-5'-P-CCNC: 39 U/L (ref 1–33)
ANION GAP SERPL CALCULATED.3IONS-SCNC: 9 MMOL/L (ref 5–15)
AST SERPL-CCNC: 30 U/L (ref 1–32)
BILIRUB SERPL-MCNC: 0.5 MG/DL (ref 0–1.2)
BUN SERPL-MCNC: 10 MG/DL (ref 6–20)
BUN/CREAT SERPL: 11.9 (ref 7–25)
CALCIUM SPEC-SCNC: 9.4 MG/DL (ref 8.6–10.5)
CHLORIDE SERPL-SCNC: 100 MMOL/L (ref 98–107)
CO2 SERPL-SCNC: 27 MMOL/L (ref 22–29)
CREAT SERPL-MCNC: 0.84 MG/DL (ref 0.57–1)
GFR SERPL CREATININE-BSD FRML MDRD: 74 ML/MIN/1.73
GLOBULIN UR ELPH-MCNC: 3.5 GM/DL
GLUCOSE BLDC GLUCOMTR-MCNC: 102 MG/DL (ref 70–130)
GLUCOSE BLDC GLUCOMTR-MCNC: 162 MG/DL (ref 70–130)
GLUCOSE BLDC GLUCOMTR-MCNC: 165 MG/DL (ref 70–130)
GLUCOSE BLDC GLUCOMTR-MCNC: 177 MG/DL (ref 70–130)
GLUCOSE SERPL-MCNC: 104 MG/DL (ref 65–99)
POTASSIUM SERPL-SCNC: 3.8 MMOL/L (ref 3.5–5.2)
PROT SERPL-MCNC: 7.6 G/DL (ref 6–8.5)
SODIUM SERPL-SCNC: 136 MMOL/L (ref 136–145)

## 2021-01-25 PROCEDURE — 63710000001 INSULIN ASPART PER 5 UNITS: Performed by: FAMILY MEDICINE

## 2021-01-25 PROCEDURE — 63710000001 INSULIN DETEMIR PER 5 UNITS: Performed by: FAMILY MEDICINE

## 2021-01-25 PROCEDURE — 82962 GLUCOSE BLOOD TEST: CPT

## 2021-01-25 PROCEDURE — 80053 COMPREHEN METABOLIC PANEL: CPT | Performed by: FAMILY MEDICINE

## 2021-01-25 PROCEDURE — 25010000002 LORAZEPAM PER 2 MG: Performed by: FAMILY MEDICINE

## 2021-01-25 PROCEDURE — 25010000002 ENOXAPARIN PER 10 MG: Performed by: FAMILY MEDICINE

## 2021-01-25 RX ADMIN — LORAZEPAM 1 MG: 2 INJECTION INTRAMUSCULAR; INTRAVENOUS at 20:37

## 2021-01-25 RX ADMIN — MELATONIN 6 MG: at 20:54

## 2021-01-25 RX ADMIN — THIAMINE HCL TAB 100 MG 100 MG: 100 TAB at 08:20

## 2021-01-25 RX ADMIN — CITALOPRAM HYDROBROMIDE 40 MG: 40 TABLET ORAL at 08:20

## 2021-01-25 RX ADMIN — LORAZEPAM 2 MG: 2 TABLET ORAL at 03:47

## 2021-01-25 RX ADMIN — AMLODIPINE BESYLATE 5 MG: 5 TABLET ORAL at 08:20

## 2021-01-25 RX ADMIN — INSULIN ASPART 2 UNITS: 100 INJECTION, SOLUTION INTRAVENOUS; SUBCUTANEOUS at 17:05

## 2021-01-25 RX ADMIN — SODIUM CHLORIDE, PRESERVATIVE FREE 10 ML: 5 INJECTION INTRAVENOUS at 20:44

## 2021-01-25 RX ADMIN — LORAZEPAM 2 MG: 2 TABLET ORAL at 17:33

## 2021-01-25 RX ADMIN — THERA TABS 1 TABLET: TAB at 08:20

## 2021-01-25 RX ADMIN — ENOXAPARIN SODIUM 40 MG: 40 INJECTION SUBCUTANEOUS at 17:05

## 2021-01-25 RX ADMIN — INSULIN DETEMIR 10 UNITS: 100 INJECTION, SOLUTION SUBCUTANEOUS at 20:44

## 2021-01-25 RX ADMIN — LORAZEPAM 2 MG: 2 TABLET ORAL at 09:38

## 2021-01-25 RX ADMIN — NALTREXONE HYDROCHLORIDE 50 MG: 50 TABLET, FILM COATED ORAL at 08:20

## 2021-01-25 RX ADMIN — PANTOPRAZOLE SODIUM 40 MG: 40 TABLET, DELAYED RELEASE ORAL at 03:47

## 2021-01-25 RX ADMIN — SODIUM CHLORIDE, PRESERVATIVE FREE 10 ML: 5 INJECTION INTRAVENOUS at 08:21

## 2021-01-25 RX ADMIN — LOSARTAN POTASSIUM 75 MG: 50 TABLET, FILM COATED ORAL at 08:20

## 2021-01-25 RX ADMIN — FOLIC ACID 1 MG: 1 TABLET ORAL at 08:20

## 2021-01-25 RX ADMIN — INSULIN ASPART 2 UNITS: 100 INJECTION, SOLUTION INTRAVENOUS; SUBCUTANEOUS at 11:22

## 2021-01-26 ENCOUNTER — READMISSION MANAGEMENT (OUTPATIENT)
Dept: CALL CENTER | Facility: HOSPITAL | Age: 41
End: 2021-01-26

## 2021-01-26 VITALS
RESPIRATION RATE: 18 BRPM | WEIGHT: 187.2 LBS | DIASTOLIC BLOOD PRESSURE: 63 MMHG | TEMPERATURE: 96.9 F | BODY MASS INDEX: 30.08 KG/M2 | HEART RATE: 64 BPM | HEIGHT: 66 IN | SYSTOLIC BLOOD PRESSURE: 106 MMHG | OXYGEN SATURATION: 97 %

## 2021-01-26 LAB
ALBUMIN SERPL-MCNC: 3.7 G/DL (ref 3.5–5.2)
ALBUMIN/GLOB SERPL: 1.1 G/DL
ALP SERPL-CCNC: 61 U/L (ref 39–117)
ALT SERPL W P-5'-P-CCNC: 35 U/L (ref 1–33)
ANION GAP SERPL CALCULATED.3IONS-SCNC: 7 MMOL/L (ref 5–15)
AST SERPL-CCNC: 25 U/L (ref 1–32)
BASOPHILS # BLD AUTO: 0.02 10*3/MM3 (ref 0–0.2)
BASOPHILS NFR BLD AUTO: 0.4 % (ref 0–1.5)
BILIRUB SERPL-MCNC: 0.3 MG/DL (ref 0–1.2)
BUN SERPL-MCNC: 11 MG/DL (ref 6–20)
BUN/CREAT SERPL: 13.6 (ref 7–25)
CALCIUM SPEC-SCNC: 9 MG/DL (ref 8.6–10.5)
CHLORIDE SERPL-SCNC: 103 MMOL/L (ref 98–107)
CO2 SERPL-SCNC: 27 MMOL/L (ref 22–29)
CREAT SERPL-MCNC: 0.81 MG/DL (ref 0.57–1)
DEPRECATED RDW RBC AUTO: 44.8 FL (ref 37–54)
EOSINOPHIL # BLD AUTO: 0.16 10*3/MM3 (ref 0–0.4)
EOSINOPHIL NFR BLD AUTO: 3 % (ref 0.3–6.2)
ERYTHROCYTE [DISTWIDTH] IN BLOOD BY AUTOMATED COUNT: 13.7 % (ref 12.3–15.4)
GFR SERPL CREATININE-BSD FRML MDRD: 78 ML/MIN/1.73
GLOBULIN UR ELPH-MCNC: 3.3 GM/DL
GLUCOSE BLDC GLUCOMTR-MCNC: 138 MG/DL (ref 70–130)
GLUCOSE BLDC GLUCOMTR-MCNC: 276 MG/DL (ref 70–130)
GLUCOSE SERPL-MCNC: 149 MG/DL (ref 65–99)
HCT VFR BLD AUTO: 34.5 % (ref 34–46.6)
HGB BLD-MCNC: 12 G/DL (ref 12–15.9)
IMM GRANULOCYTES # BLD AUTO: 0.02 10*3/MM3 (ref 0–0.05)
IMM GRANULOCYTES NFR BLD AUTO: 0.4 % (ref 0–0.5)
LYMPHOCYTES # BLD AUTO: 1.61 10*3/MM3 (ref 0.7–3.1)
LYMPHOCYTES NFR BLD AUTO: 30.1 % (ref 19.6–45.3)
MCH RBC QN AUTO: 31.6 PG (ref 26.6–33)
MCHC RBC AUTO-ENTMCNC: 34.8 G/DL (ref 31.5–35.7)
MCV RBC AUTO: 90.8 FL (ref 79–97)
MONOCYTES # BLD AUTO: 0.56 10*3/MM3 (ref 0.1–0.9)
MONOCYTES NFR BLD AUTO: 10.5 % (ref 5–12)
NEUTROPHILS NFR BLD AUTO: 2.98 10*3/MM3 (ref 1.7–7)
NEUTROPHILS NFR BLD AUTO: 55.6 % (ref 42.7–76)
NRBC BLD AUTO-RTO: 0 /100 WBC (ref 0–0.2)
PLATELET # BLD AUTO: 248 10*3/MM3 (ref 140–450)
PMV BLD AUTO: 10 FL (ref 6–12)
POTASSIUM SERPL-SCNC: 3.7 MMOL/L (ref 3.5–5.2)
PROT SERPL-MCNC: 7 G/DL (ref 6–8.5)
RBC # BLD AUTO: 3.8 10*6/MM3 (ref 3.77–5.28)
SODIUM SERPL-SCNC: 137 MMOL/L (ref 136–145)
WBC # BLD AUTO: 5.35 10*3/MM3 (ref 3.4–10.8)

## 2021-01-26 PROCEDURE — 63710000001 INSULIN ASPART PER 5 UNITS: Performed by: FAMILY MEDICINE

## 2021-01-26 PROCEDURE — 85025 COMPLETE CBC W/AUTO DIFF WBC: CPT | Performed by: FAMILY MEDICINE

## 2021-01-26 PROCEDURE — 80053 COMPREHEN METABOLIC PANEL: CPT | Performed by: FAMILY MEDICINE

## 2021-01-26 PROCEDURE — 82962 GLUCOSE BLOOD TEST: CPT

## 2021-01-26 RX ORDER — DIPHENOXYLATE HYDROCHLORIDE AND ATROPINE SULFATE 2.5; .025 MG/1; MG/1
1 TABLET ORAL DAILY
Qty: 30 TABLET | Refills: 0 | Status: SHIPPED | OUTPATIENT
Start: 2021-01-27 | End: 2023-01-04 | Stop reason: SDUPTHER

## 2021-01-26 RX ORDER — FOLIC ACID 1 MG/1
1 TABLET ORAL DAILY
Qty: 30 TABLET | Refills: 0 | Status: SHIPPED | OUTPATIENT
Start: 2021-01-27 | End: 2021-01-26

## 2021-01-26 RX ORDER — FOLIC ACID 1 MG/1
1 TABLET ORAL DAILY
Qty: 30 TABLET | Refills: 0 | Status: SHIPPED | OUTPATIENT
Start: 2021-01-27 | End: 2023-01-04 | Stop reason: SDUPTHER

## 2021-01-26 RX ORDER — DIPHENOXYLATE HYDROCHLORIDE AND ATROPINE SULFATE 2.5; .025 MG/1; MG/1
1 TABLET ORAL DAILY
Qty: 30 TABLET | Refills: 0 | Status: SHIPPED | OUTPATIENT
Start: 2021-01-27 | End: 2021-01-26

## 2021-01-26 RX ORDER — NALTREXONE HYDROCHLORIDE 50 MG/1
50 TABLET, FILM COATED ORAL DAILY
Qty: 30 TABLET | Refills: 0 | Status: SHIPPED | OUTPATIENT
Start: 2021-01-26 | End: 2023-02-12

## 2021-01-26 RX ORDER — ONDANSETRON 4 MG/1
4 TABLET, ORALLY DISINTEGRATING ORAL EVERY 8 HOURS PRN
Qty: 30 TABLET | Refills: 0 | Status: SHIPPED | OUTPATIENT
Start: 2021-01-26 | End: 2021-01-26 | Stop reason: SDUPTHER

## 2021-01-26 RX ORDER — NALTREXONE HYDROCHLORIDE 50 MG/1
50 TABLET, FILM COATED ORAL DAILY
Qty: 30 TABLET | Refills: 0 | Status: SHIPPED | OUTPATIENT
Start: 2021-01-26 | End: 2021-01-26

## 2021-01-26 RX ORDER — LORAZEPAM 2 MG/1
2 TABLET ORAL TAKE AS DIRECTED
Qty: 5 TABLET | Refills: 0 | Status: SHIPPED | OUTPATIENT
Start: 2021-01-26 | End: 2023-02-12

## 2021-01-26 RX ORDER — METHION/INOS/CHOL BT/B COM/LIV 110MG-86MG
100 CAPSULE ORAL DAILY
Qty: 30 EACH | Refills: 0 | Status: SHIPPED | OUTPATIENT
Start: 2021-01-27 | End: 2023-01-04 | Stop reason: SDUPTHER

## 2021-01-26 RX ORDER — ONDANSETRON 4 MG/1
4 TABLET, ORALLY DISINTEGRATING ORAL EVERY 8 HOURS PRN
Qty: 30 TABLET | Refills: 0 | Status: SHIPPED | OUTPATIENT
Start: 2021-01-26 | End: 2023-01-04 | Stop reason: SDUPTHER

## 2021-01-26 RX ADMIN — SODIUM CHLORIDE, PRESERVATIVE FREE 10 ML: 5 INJECTION INTRAVENOUS at 08:03

## 2021-01-26 RX ADMIN — THERA TABS 1 TABLET: TAB at 08:03

## 2021-01-26 RX ADMIN — AMLODIPINE BESYLATE 5 MG: 5 TABLET ORAL at 08:03

## 2021-01-26 RX ADMIN — LORAZEPAM 2 MG: 2 TABLET ORAL at 02:32

## 2021-01-26 RX ADMIN — NALTREXONE HYDROCHLORIDE 50 MG: 50 TABLET, FILM COATED ORAL at 08:03

## 2021-01-26 RX ADMIN — CITALOPRAM HYDROBROMIDE 40 MG: 40 TABLET ORAL at 08:03

## 2021-01-26 RX ADMIN — INSULIN ASPART 4 UNITS: 100 INJECTION, SOLUTION INTRAVENOUS; SUBCUTANEOUS at 11:26

## 2021-01-26 RX ADMIN — FOLIC ACID 1 MG: 1 TABLET ORAL at 08:03

## 2021-01-26 RX ADMIN — THIAMINE HCL TAB 100 MG 100 MG: 100 TAB at 08:03

## 2021-01-26 RX ADMIN — LOSARTAN POTASSIUM 75 MG: 50 TABLET, FILM COATED ORAL at 08:03

## 2021-01-26 RX ADMIN — PANTOPRAZOLE SODIUM 40 MG: 40 TABLET, DELAYED RELEASE ORAL at 06:14

## 2021-01-26 NOTE — OUTREACH NOTE
Prep Survey      Responses   [facility] [place] [facility]?  [facility]   Is LACE score < 7 ?  No   Emergency Room discharge w/ pulse ox?  No   Eligibility  Readm Mgmt [[facility] Resident appt.]   Discharge diagnosis  Alcohol withdrawal syndrome with complication,Hypokalemia,Hypomagnesemia   Does the patient have one of the following disease processes/diagnoses(primary or secondary)?  Other   Does the patient have Home health ordered?  No   Is there a DME ordered?  No   Comments regarding appointments  F/U with Resident MD   General alerts for this patient  -- [Refused alcoholic tx.]   Prep survey completed?  Yes          Otilia Solis RN

## 2021-01-28 ENCOUNTER — READMISSION MANAGEMENT (OUTPATIENT)
Dept: CALL CENTER | Facility: HOSPITAL | Age: 41
End: 2021-01-28

## 2021-01-28 NOTE — OUTREACH NOTE
Medical Week 1 Survey      Responses   [facility] patient discharged from?  [place]   Does the patient have one of the following disease processes/diagnoses(primary or secondary)?  Other   Week 1 attempt successful?  No   Unsuccessful attempts  Attempt 1          Imani Patel RN

## 2021-01-29 ENCOUNTER — READMISSION MANAGEMENT (OUTPATIENT)
Dept: CALL CENTER | Facility: HOSPITAL | Age: 41
End: 2021-01-29

## 2021-01-29 NOTE — OUTREACH NOTE
Medical Week 1 Survey      Responses   [facility] patient discharged from?  [place]   Does the patient have one of the following disease processes/diagnoses(primary or secondary)?  Other   Week 1 attempt successful?  No   Unsuccessful attempts  Attempt 2          Perla Hunter RN

## 2021-02-01 ENCOUNTER — READMISSION MANAGEMENT (OUTPATIENT)
Dept: CALL CENTER | Facility: HOSPITAL | Age: 41
End: 2021-02-01

## 2021-02-02 ENCOUNTER — READMISSION MANAGEMENT (OUTPATIENT)
Dept: CALL CENTER | Facility: HOSPITAL | Age: 41
End: 2021-02-02

## 2021-02-02 NOTE — OUTREACH NOTE
Medical Week 2 Survey      Responses   [facility] patient discharged from?  [place]   Does the patient have one of the following disease processes/diagnoses(primary or secondary)?  Other   Week 2 attempt successful?  No   Revoke  Decline to participate [Revoked patient due to 4 unsuccessful attempts to reach patient. ]          Perla Hunter RN

## 2021-02-02 NOTE — OUTREACH NOTE
Medical Week 2 Survey      Responses   [facility] patient discharged from?  [place]   Does the patient have one of the following disease processes/diagnoses(primary or secondary)?  Other   Week 2 attempt successful?  No   Unsuccessful attempts  Attempt 1          Kathy Weiner RN

## 2021-03-03 PROBLEM — Z91.14 NON COMPLIANCE W MEDICATION REGIMEN: Status: ACTIVE | Noted: 2018-04-26

## 2021-03-03 PROBLEM — B00.1 RECURRENT HERPES LABIALIS: Status: ACTIVE | Noted: 2019-03-14

## 2021-03-03 PROBLEM — R25.2 MUSCLE CRAMPS: Status: ACTIVE | Noted: 2018-04-26

## 2021-03-03 PROBLEM — Z91.148 NON COMPLIANCE W MEDICATION REGIMEN: Status: ACTIVE | Noted: 2018-04-26

## 2021-03-03 PROBLEM — F10.980 ALCOHOL-INDUCED ANXIETY DISORDER (HCC): Status: ACTIVE | Noted: 2017-02-13

## 2021-03-03 PROBLEM — R20.0 NUMBNESS AND TINGLING OF LEFT LEG: Status: ACTIVE | Noted: 2019-01-29

## 2021-03-03 PROBLEM — Z72.0 TOBACCO ABUSE DISORDER: Status: ACTIVE | Noted: 2017-08-09

## 2021-03-03 PROBLEM — R20.2 NUMBNESS AND TINGLING OF LEFT LEG: Status: ACTIVE | Noted: 2019-01-29

## 2021-03-03 PROBLEM — R06.2 WHEEZING: Status: ACTIVE | Noted: 2019-01-29

## 2021-03-03 PROBLEM — E11.3293 MILD NONPROLIFERATIVE DIABETIC RETINOPATHY OF BOTH EYES ASSOCIATED WITH TYPE 2 DIABETES MELLITUS (HCC): Status: ACTIVE | Noted: 2019-01-29

## 2021-03-03 PROBLEM — G47.00 INSOMNIA: Status: ACTIVE | Noted: 2019-12-11

## 2021-03-03 PROBLEM — G44.82 ORGASMIC HEADACHE: Status: ACTIVE | Noted: 2017-08-09

## 2021-03-12 ENCOUNTER — OFFICE VISIT (OUTPATIENT)
Dept: OBSTETRICS AND GYNECOLOGY | Facility: CLINIC | Age: 41
End: 2021-03-12

## 2021-03-12 VITALS
HEIGHT: 66 IN | BODY MASS INDEX: 29.73 KG/M2 | WEIGHT: 185 LBS | SYSTOLIC BLOOD PRESSURE: 142 MMHG | DIASTOLIC BLOOD PRESSURE: 98 MMHG

## 2021-03-12 DIAGNOSIS — N93.9 ABNORMAL UTERINE BLEEDING (AUB): Primary | ICD-10-CM

## 2021-03-12 DIAGNOSIS — Z12.31 SCREENING MAMMOGRAM, ENCOUNTER FOR: ICD-10-CM

## 2021-03-12 PROCEDURE — 99204 OFFICE O/P NEW MOD 45 MIN: CPT | Performed by: NURSE PRACTITIONER

## 2021-03-12 NOTE — PROGRESS NOTES
Subjective   Julia Gibson is a 40 y.o. here for period problems    Julia Gibson is a 40 yr old  who presents to day with concerns about her periods. Pt reports that her period last month on 2021 was heavy and passed golf-size clots but lasted only for 3 days. She got her period again on 3/8 and feels the amount of bleeding this time is her usual. Pt denies any pelvic pain. Pt reports she had gone to [facility] last month due to yellow, foul vaginal discharge and was found to be negative for STDs and UTI. Pt denies any symptoms today for STDS and UTI.       The following portions of the patient's history were reviewed and updated as appropriate: allergies, current medications, past family history, past medical history, past social history, past surgical history and problem list.    Review of Systems   Constitutional: Negative for chills, fatigue, fever, unexpected weight gain and unexpected weight loss.   HENT: Negative for sneezing and sore throat.    Respiratory: Negative for shortness of breath.    Cardiovascular: Negative for chest pain and palpitations.   Gastrointestinal: Negative for abdominal pain, constipation, diarrhea and nausea.   Genitourinary: Positive for menstrual problem. Negative for breast discharge, breast lump, breast pain, difficulty urinating, dysuria, frequency, pelvic pain, pelvic pressure, urinary incontinence, vaginal bleeding, vaginal discharge and vaginal pain.   Skin: Negative for rash.   Neurological: Negative for weakness and headache.   Psychiatric/Behavioral: Negative for sleep disturbance, depressed mood and stress.       Objective   Physical Exam  Vitals and nursing note reviewed.   Constitutional:       Appearance: She is well-developed.   HENT:      Head: Normocephalic.   Pulmonary:      Effort: Pulmonary effort is normal.   Musculoskeletal:         General: Normal range of motion.   Skin:     General: Skin is warm and dry.   Neurological:      Mental Status: She  is alert and oriented to person, place, and time.   Psychiatric:         Behavior: Behavior normal.           Assessment/Plan   Diagnoses and all orders for this visit:    1. Abnormal uterine bleeding (AUB) (Primary)  -     CBC Auto Differential  -     Luteinizing Hormone  -     Estradiol  -     TSH+Free T4  -     Follicle Stimulating Hormone  -     Prolactin  -     hCG, Serum, Qualitative    2. Screening mammogram, encounter for  -     Mammo Screening Digital Tomosynthesis Bilateral With CAD; Future           Provided reassurance that at this time I believe her menstrual cycles are within normal limits as last period lasted only for 3 days and this current period is 21 days later and has been only for 5 days and flow appears to be usual this time. Will order bloodwork for patient reassurance and counseled for patient to return if periods occur less than 21 days and last longer than 10 days. Will do an ultrasound at this point. Counseled to schedule mammogram. Per patient, Pap is up to date and normal; done over a year ago at [facility]. Pt declined STI screening. Will call patient with results of labwork. Return as needed.

## 2021-06-29 ENCOUNTER — APPOINTMENT (OUTPATIENT)
Dept: GENERAL RADIOLOGY | Facility: HOSPITAL | Age: 41
End: 2021-06-29

## 2021-06-29 ENCOUNTER — HOSPITAL ENCOUNTER (EMERGENCY)
Facility: HOSPITAL | Age: 41
Discharge: HOME | End: 2021-06-29
Attending: EMERGENCY MEDICINE | Admitting: EMERGENCY MEDICINE

## 2021-06-29 VITALS
HEART RATE: 107 BPM | TEMPERATURE: 98.1 F | DIASTOLIC BLOOD PRESSURE: 86 MMHG | RESPIRATION RATE: 18 BRPM | OXYGEN SATURATION: 98 % | SYSTOLIC BLOOD PRESSURE: 139 MMHG

## 2021-06-29 DIAGNOSIS — F10.920 ALCOHOLIC INTOXICATION WITHOUT COMPLICATION (HCC): Primary | ICD-10-CM

## 2021-06-29 LAB
ALBUMIN SERPL-MCNC: 4.5 G/DL (ref 3.5–5.2)
ALBUMIN/GLOB SERPL: 1.5 G/DL
ALP SERPL-CCNC: 69 U/L (ref 39–117)
ALT SERPL W P-5'-P-CCNC: 30 U/L (ref 1–33)
AMPHET+METHAMPHET UR QL: NEGATIVE
AMPHETAMINES UR QL: NEGATIVE
ANION GAP SERPL CALCULATED.3IONS-SCNC: 10 MMOL/L (ref 5–15)
APAP SERPL-MCNC: <5 MCG/ML (ref 0–30)
AST SERPL-CCNC: 22 U/L (ref 1–32)
BACTERIA UR QL AUTO: ABNORMAL /HPF
BARBITURATES UR QL SCN: NEGATIVE
BASOPHILS # BLD AUTO: 0.03 10*3/MM3 (ref 0–0.2)
BASOPHILS NFR BLD AUTO: 0.5 % (ref 0–1.5)
BENZODIAZ UR QL SCN: NEGATIVE
BILIRUB SERPL-MCNC: <0.2 MG/DL (ref 0–1.2)
BILIRUB UR QL STRIP: NEGATIVE
BUN SERPL-MCNC: 7 MG/DL (ref 6–20)
BUN/CREAT SERPL: 8.6 (ref 7–25)
BUPRENORPHINE SERPL-MCNC: NEGATIVE NG/ML
CALCIUM SPEC-SCNC: 9.4 MG/DL (ref 8.6–10.5)
CANNABINOIDS SERPL QL: NEGATIVE
CHLORIDE SERPL-SCNC: 106 MMOL/L (ref 98–107)
CLARITY UR: ABNORMAL
CO2 SERPL-SCNC: 25 MMOL/L (ref 22–29)
COCAINE UR QL: NEGATIVE
COLOR UR: YELLOW
CREAT SERPL-MCNC: 0.81 MG/DL (ref 0.57–1)
DEPRECATED RDW RBC AUTO: 44.5 FL (ref 37–54)
EOSINOPHIL # BLD AUTO: 0.1 10*3/MM3 (ref 0–0.4)
EOSINOPHIL NFR BLD AUTO: 1.7 % (ref 0.3–6.2)
ERYTHROCYTE [DISTWIDTH] IN BLOOD BY AUTOMATED COUNT: 13 % (ref 12.3–15.4)
ETHANOL BLD-MCNC: 367 MG/DL (ref 0–10)
ETHANOL UR QL: 0.37 %
GFR SERPL CREATININE-BSD FRML MDRD: 78 ML/MIN/1.73
GLOBULIN UR ELPH-MCNC: 3.1 GM/DL
GLUCOSE SERPL-MCNC: 186 MG/DL (ref 65–99)
GLUCOSE UR STRIP-MCNC: NEGATIVE MG/DL
HCT VFR BLD AUTO: 39.1 % (ref 34–46.6)
HGB BLD-MCNC: 13.1 G/DL (ref 12–15.9)
HGB UR QL STRIP.AUTO: ABNORMAL
HOLD SPECIMEN: NORMAL
HOLD SPECIMEN: NORMAL
HYALINE CASTS UR QL AUTO: ABNORMAL /LPF
IMM GRANULOCYTES # BLD AUTO: 0.02 10*3/MM3 (ref 0–0.05)
IMM GRANULOCYTES NFR BLD AUTO: 0.3 % (ref 0–0.5)
KETONES UR QL STRIP: NEGATIVE
LEUKOCYTE ESTERASE UR QL STRIP.AUTO: ABNORMAL
LYMPHOCYTES # BLD AUTO: 2.38 10*3/MM3 (ref 0.7–3.1)
LYMPHOCYTES NFR BLD AUTO: 39.7 % (ref 19.6–45.3)
MAGNESIUM SERPL-MCNC: 1.8 MG/DL (ref 1.6–2.6)
MCH RBC QN AUTO: 31.6 PG (ref 26.6–33)
MCHC RBC AUTO-ENTMCNC: 33.5 G/DL (ref 31.5–35.7)
MCV RBC AUTO: 94.2 FL (ref 79–97)
METHADONE UR QL SCN: NEGATIVE
MONOCYTES # BLD AUTO: 0.34 10*3/MM3 (ref 0.1–0.9)
MONOCYTES NFR BLD AUTO: 5.7 % (ref 5–12)
NEUTROPHILS NFR BLD AUTO: 3.12 10*3/MM3 (ref 1.7–7)
NEUTROPHILS NFR BLD AUTO: 52.1 % (ref 42.7–76)
NITRITE UR QL STRIP: NEGATIVE
NRBC BLD AUTO-RTO: 0 /100 WBC (ref 0–0.2)
OPIATES UR QL: NEGATIVE
OXYCODONE UR QL SCN: NEGATIVE
PCP UR QL SCN: NEGATIVE
PH UR STRIP.AUTO: <=5 [PH] (ref 5–9)
PLATELET # BLD AUTO: 291 10*3/MM3 (ref 140–450)
PMV BLD AUTO: 9.6 FL (ref 6–12)
POTASSIUM SERPL-SCNC: 4.4 MMOL/L (ref 3.5–5.2)
PROPOXYPH UR QL: NEGATIVE
PROT SERPL-MCNC: 7.6 G/DL (ref 6–8.5)
PROT UR QL STRIP: ABNORMAL
RBC # BLD AUTO: 4.15 10*6/MM3 (ref 3.77–5.28)
RBC # UR: ABNORMAL /HPF
REF LAB TEST METHOD: ABNORMAL
SALICYLATES SERPL-MCNC: <0.3 MG/DL
SODIUM SERPL-SCNC: 141 MMOL/L (ref 136–145)
SP GR UR STRIP: 1 (ref 1–1.03)
SQUAMOUS #/AREA URNS HPF: ABNORMAL /HPF
TRICYCLICS UR QL SCN: NEGATIVE
UROBILINOGEN UR QL STRIP: ABNORMAL
WBC # BLD AUTO: 5.99 10*3/MM3 (ref 3.4–10.8)
WBC UR QL AUTO: ABNORMAL /HPF
WHOLE BLOOD HOLD SPECIMEN: NORMAL

## 2021-06-29 PROCEDURE — 80143 DRUG ASSAY ACETAMINOPHEN: CPT | Performed by: EMERGENCY MEDICINE

## 2021-06-29 PROCEDURE — 80306 DRUG TEST PRSMV INSTRMNT: CPT | Performed by: EMERGENCY MEDICINE

## 2021-06-29 PROCEDURE — 80179 DRUG ASSAY SALICYLATE: CPT | Performed by: EMERGENCY MEDICINE

## 2021-06-29 PROCEDURE — 71045 X-RAY EXAM CHEST 1 VIEW: CPT

## 2021-06-29 PROCEDURE — 82077 ASSAY SPEC XCP UR&BREATH IA: CPT | Performed by: EMERGENCY MEDICINE

## 2021-06-29 PROCEDURE — 80053 COMPREHEN METABOLIC PANEL: CPT | Performed by: EMERGENCY MEDICINE

## 2021-06-29 PROCEDURE — P9612 CATHETERIZE FOR URINE SPEC: HCPCS

## 2021-06-29 PROCEDURE — 83735 ASSAY OF MAGNESIUM: CPT | Performed by: EMERGENCY MEDICINE

## 2021-06-29 PROCEDURE — 96365 THER/PROPH/DIAG IV INF INIT: CPT

## 2021-06-29 PROCEDURE — 25010000002 THIAMINE PER 100 MG: Performed by: EMERGENCY MEDICINE

## 2021-06-29 PROCEDURE — 85025 COMPLETE CBC W/AUTO DIFF WBC: CPT | Performed by: EMERGENCY MEDICINE

## 2021-06-29 PROCEDURE — 81001 URINALYSIS AUTO W/SCOPE: CPT | Performed by: EMERGENCY MEDICINE

## 2021-06-29 PROCEDURE — 99283 EMERGENCY DEPT VISIT LOW MDM: CPT

## 2021-06-29 RX ORDER — SODIUM CHLORIDE 0.9 % (FLUSH) 0.9 %
10 SYRINGE (ML) INJECTION AS NEEDED
Status: DISCONTINUED | OUTPATIENT
Start: 2021-06-29 | End: 2021-06-29 | Stop reason: HOSPADM

## 2021-06-29 RX ADMIN — THIAMINE HYDROCHLORIDE 1000 ML/HR: 100 INJECTION, SOLUTION INTRAMUSCULAR; INTRAVENOUS at 17:03

## 2022-08-15 ENCOUNTER — APPOINTMENT (OUTPATIENT)
Dept: GENERAL RADIOLOGY | Facility: HOSPITAL | Age: 42
End: 2022-08-15

## 2022-08-15 ENCOUNTER — HOSPITAL ENCOUNTER (EMERGENCY)
Facility: HOSPITAL | Age: 42
Discharge: HOME | End: 2022-08-15
Attending: STUDENT IN AN ORGANIZED HEALTH CARE EDUCATION/TRAINING PROGRAM | Admitting: STUDENT IN AN ORGANIZED HEALTH CARE EDUCATION/TRAINING PROGRAM

## 2022-08-15 VITALS
TEMPERATURE: 97.7 F | BODY MASS INDEX: 33.59 KG/M2 | WEIGHT: 209 LBS | DIASTOLIC BLOOD PRESSURE: 75 MMHG | SYSTOLIC BLOOD PRESSURE: 154 MMHG | RESPIRATION RATE: 20 BRPM | HEART RATE: 87 BPM | OXYGEN SATURATION: 100 % | HEIGHT: 66 IN

## 2022-08-15 DIAGNOSIS — I10 HYPERTENSION, UNSPECIFIED TYPE: Primary | ICD-10-CM

## 2022-08-15 DIAGNOSIS — R68.83 CHILLS: ICD-10-CM

## 2022-08-15 LAB
FLUAV SUBTYP SPEC NAA+PROBE: NOT DETECTED
FLUBV RNA ISLT QL NAA+PROBE: NOT DETECTED
SARS-COV-2 RNA PNL SPEC NAA+PROBE: NOT DETECTED

## 2022-08-15 PROCEDURE — 99283 EMERGENCY DEPT VISIT LOW MDM: CPT

## 2022-08-15 PROCEDURE — 71045 X-RAY EXAM CHEST 1 VIEW: CPT

## 2022-08-15 PROCEDURE — 96374 THER/PROPH/DIAG INJ IV PUSH: CPT

## 2022-08-15 PROCEDURE — 87636 SARSCOV2 & INF A&B AMP PRB: CPT | Performed by: STUDENT IN AN ORGANIZED HEALTH CARE EDUCATION/TRAINING PROGRAM

## 2022-08-15 PROCEDURE — 25010000002 HYDRALAZINE PER 20 MG: Performed by: STUDENT IN AN ORGANIZED HEALTH CARE EDUCATION/TRAINING PROGRAM

## 2022-08-15 RX ORDER — HYDRALAZINE HYDROCHLORIDE 20 MG/ML
20 INJECTION INTRAMUSCULAR; INTRAVENOUS ONCE
Status: COMPLETED | OUTPATIENT
Start: 2022-08-15 | End: 2022-08-15

## 2022-08-15 RX ADMIN — HYDRALAZINE HYDROCHLORIDE 20 MG: 20 INJECTION INTRAMUSCULAR; INTRAVENOUS at 12:23

## 2022-08-15 NOTE — ED NOTES
Report given from [facility], covid s/s (chills, n/v, high bp, shaky). bp at [facility] 220/110, clonidine administered, bp did not go down. Rapid covid test there was negative.

## 2022-08-15 NOTE — ED PROVIDER NOTES
Subjective   42-year-old female comes to the ER with 1 day history of chills, sweating, nausea, hypertension.  Patient's  is sick with presumed COVID.  Patient's rapid test at the urgent care was negative, but she was referred to the ER due to her blood pressure being high.      History provided by:  Patient   used: No        Review of Systems   Constitutional: Positive for activity change, appetite change, chills, diaphoresis and fatigue. Negative for fever.   HENT: Negative for drooling.    Eyes: Negative for redness.   Respiratory: Negative for cough, chest tightness, shortness of breath and wheezing.    Cardiovascular: Negative for chest pain and palpitations.   Gastrointestinal: Positive for nausea. Negative for abdominal pain, constipation, diarrhea and vomiting.   Genitourinary: Negative for flank pain.   Musculoskeletal: Negative for arthralgias and myalgias.   Skin: Negative for color change.   Neurological: Negative for seizures.   Psychiatric/Behavioral: Negative for confusion.       Past Medical History:   Diagnosis Date   • Acute bronchitis    • Acute conjunctivitis     left   • Acute pharyngitis    • Alcohol abuse    • Allergic rhinitis due to pollen    • Anxiety state    • Asthma    • Constipation    • Depressive disorder    • Diarrhea    • GERD (gastroesophageal reflux disease)    • Hypertensive disorder    • Insomnia    • Knee pain    • Migraine    • Nausea    • Nausea and vomiting    • Psychiatric illness    • Rib pain    • Tobacco dependence syndrome    • Type 2 diabetes mellitus (HCC)     uncontrolled   • URI (upper respiratory infection)    • Viral intestinal infection, unspecified    • Wheezing        Allergies   Allergen Reactions   • Lisinopril Cough     Is currently prescribed lisinopril for her HTN, but isn't taking it due to cough; hasn't been back to see the MD   • Penicillins Unknown (See Comments)     Pt was five and unsure of reaction       Past Surgical  "History:   Procedure Laterality Date   • ABDOMINAL SURGERY     •  SECTION     • INJECTION OF MEDICATION  2016    Kenalog   • INJECTION OF MEDICATION  2015    Toradol   • PAP SMEAR  2010   • TUBAL ABDOMINAL LIGATION         Family History   Problem Relation Age of Onset   • Drug abuse Mother         addicted to pain pills,  from overdose   • Suicide Attempts Mother    • Heart attack Father    • Alcohol abuse Father    • Hepatitis Sister         IV drug use   • Drug abuse Sister    • Bipolar disorder Sister    • Colon cancer Maternal Grandmother          at age 19   • Heart disease Maternal Grandfather    • Tuberculosis Paternal Grandfather    • ADD / ADHD Son        Social History     Socioeconomic History   • Marital status:    • Number of children: 2   • Years of education: 12   Tobacco Use   • Smoking status: Current Every Day Smoker     Packs/day: 1.00     Years: 8.00     Pack years: 8.00     Types: Cigarettes   • Smokeless tobacco: Never Used   Substance and Sexual Activity   • Alcohol use: Yes     Comment: \"a fifth a day\", vodka daily   • Drug use: No     Types: Marijuana     Comment: when was younger   • Sexual activity: Never     Birth control/protection: Surgical           Objective    Vitals:    08/15/22 1204 08/15/22 1223 08/15/22 1239 08/15/22 1300   BP: (!) 220/102 (!) 208/101 (!) 173/101 154/75   BP Location: Left arm  Left arm Left arm   Patient Position: Sitting  Lying Lying   Pulse: 96 88 102 100   Resp: 20   20   Temp:       TempSrc:       SpO2: 96%   96%   Weight:       Height:           Physical Exam  Vitals and nursing note reviewed.   Constitutional:       General: She is not in acute distress.     Appearance: She is well-developed. She is not ill-appearing, toxic-appearing or diaphoretic.   HENT:      Head: Normocephalic.      Right Ear: External ear normal.      Left Ear: External ear normal.   Pulmonary:      Effort: Pulmonary effort is normal. No " accessory muscle usage or respiratory distress.      Breath sounds: No wheezing.   Chest:      Chest wall: No tenderness.   Abdominal:      General: Bowel sounds are normal.      Palpations: Abdomen is soft.      Tenderness: There is no abdominal tenderness (deep palpation). There is no guarding or rebound.   Musculoskeletal:      Right lower leg: No edema.      Left lower leg: No edema.   Skin:     General: Skin is warm and dry.      Capillary Refill: Capillary refill takes less than 2 seconds.   Neurological:      Mental Status: She is alert and oriented to person, place, and time.   Psychiatric:         Behavior: Behavior normal.         Procedures           ED Course      Results for orders placed or performed during the hospital encounter of 08/15/22   COVID-19 and FLU A/B PCR - Swab, Nasopharynx    Specimen: Nasopharynx; Swab   Result Value Ref Range    COVID19 Not Detected Not Detected - Ref. Range    Influenza A PCR Not Detected Not Detected    Influenza B PCR Not Detected Not Detected     XR Chest 1 View   Final Result   No acute disease.      [id number]      Electronically signed by:  Rolando Wooten MD  8/15/2022 1:00   PM CDT Workstation: [id number]          [facility]  Number of Diagnoses or Management Options  Chills: new and requires workup  Hypertension, unspecified type: new and requires workup  Diagnosis management comments: Vital signs are stable, afebrile.  Patient's COVID and flu are negative.  Chest x-ray shows no acute cardiopulmonary process.  Blood pressure improved with hydralazine.  Recommend follow-up with her PCP.  Return precautions given.  Patient states understanding and is agreeable to the plan.      Final diagnoses:   Hypertension, unspecified type   Chills       ED Disposition  ED Disposition     ED Disposition   Discharge    Condition   Stable    Comment   --             [facility] - [facility]  [address] [place]  [address]  [phone]  Schedule an appointment as soon as possible for a visit in 2 days  ER follow up         Medication List      No changes were made to your prescriptions during this visit.          Shane Villasenor MD  08/15/22 3087

## 2023-01-01 ENCOUNTER — APPOINTMENT (OUTPATIENT)
Dept: CT IMAGING | Facility: HOSPITAL | Age: 43
End: 2023-01-01
Payer: COMMERCIAL

## 2023-01-01 ENCOUNTER — HOSPITAL ENCOUNTER (EMERGENCY)
Facility: HOSPITAL | Age: 43
Discharge: HOME | End: 2023-01-01
Attending: STUDENT IN AN ORGANIZED HEALTH CARE EDUCATION/TRAINING PROGRAM | Admitting: STUDENT IN AN ORGANIZED HEALTH CARE EDUCATION/TRAINING PROGRAM
Payer: COMMERCIAL

## 2023-01-01 VITALS
HEART RATE: 104 BPM | WEIGHT: 210 LBS | DIASTOLIC BLOOD PRESSURE: 81 MMHG | SYSTOLIC BLOOD PRESSURE: 175 MMHG | OXYGEN SATURATION: 95 % | HEIGHT: 66 IN | BODY MASS INDEX: 33.75 KG/M2 | RESPIRATION RATE: 20 BRPM | TEMPERATURE: 97.7 F

## 2023-01-01 DIAGNOSIS — R73.9 HYPERGLYCEMIA: ICD-10-CM

## 2023-01-01 DIAGNOSIS — E86.0 DEHYDRATION: Primary | ICD-10-CM

## 2023-01-01 DIAGNOSIS — I10 HYPERTENSION, UNSPECIFIED TYPE: ICD-10-CM

## 2023-01-01 LAB
ACETONE BLD QL: NEGATIVE
ALBUMIN SERPL-MCNC: 4.4 G/DL (ref 3.5–5.2)
ALBUMIN/GLOB SERPL: 1.3 G/DL
ALP SERPL-CCNC: 83 U/L (ref 39–117)
ALT SERPL W P-5'-P-CCNC: 112 U/L (ref 1–33)
ANION GAP SERPL CALCULATED.3IONS-SCNC: 18 MMOL/L (ref 5–15)
APAP SERPL-MCNC: <5 MCG/ML (ref 0–30)
AST SERPL-CCNC: 47 U/L (ref 1–32)
B-HCG UR QL: NEGATIVE
BACTERIA UR QL AUTO: ABNORMAL /HPF
BASOPHILS # BLD AUTO: 0.04 10*3/MM3 (ref 0–0.2)
BASOPHILS NFR BLD AUTO: 0.5 % (ref 0–1.5)
BILIRUB SERPL-MCNC: 0.5 MG/DL (ref 0–1.2)
BILIRUB UR QL STRIP: NEGATIVE
BUN SERPL-MCNC: 6 MG/DL (ref 6–20)
BUN/CREAT SERPL: 8.7 (ref 7–25)
CALCIUM SPEC-SCNC: 9 MG/DL (ref 8.6–10.5)
CHLORIDE SERPL-SCNC: 93 MMOL/L (ref 98–107)
CLARITY UR: CLEAR
CO2 SERPL-SCNC: 22 MMOL/L (ref 22–29)
COLOR UR: YELLOW
CREAT SERPL-MCNC: 0.69 MG/DL (ref 0.57–1)
DEPRECATED RDW RBC AUTO: 47 FL (ref 37–54)
EGFRCR SERPLBLD CKD-EPI 2021: 109.9 ML/MIN/1.73
EOSINOPHIL # BLD AUTO: 0.09 10*3/MM3 (ref 0–0.4)
EOSINOPHIL NFR BLD AUTO: 1.1 % (ref 0.3–6.2)
ERYTHROCYTE [DISTWIDTH] IN BLOOD BY AUTOMATED COUNT: 13.7 % (ref 12.3–15.4)
ETHANOL BLD-MCNC: <10 MG/DL (ref 0–10)
ETHANOL UR QL: <0.01 %
GLOBULIN UR ELPH-MCNC: 3.3 GM/DL
GLUCOSE SERPL-MCNC: 301 MG/DL (ref 65–99)
GLUCOSE UR STRIP-MCNC: ABNORMAL MG/DL
HCT VFR BLD AUTO: 40.2 % (ref 34–46.6)
HGB BLD-MCNC: 14.5 G/DL (ref 12–15.9)
HGB UR QL STRIP.AUTO: NEGATIVE
HOLD SPECIMEN: NORMAL
HYALINE CASTS UR QL AUTO: ABNORMAL /LPF
IMM GRANULOCYTES # BLD AUTO: 0.03 10*3/MM3 (ref 0–0.05)
IMM GRANULOCYTES NFR BLD AUTO: 0.4 % (ref 0–0.5)
KETONES UR QL STRIP: ABNORMAL
LEUKOCYTE ESTERASE UR QL STRIP.AUTO: NEGATIVE
LIPASE SERPL-CCNC: 34 U/L (ref 13–60)
LYMPHOCYTES # BLD AUTO: 1.31 10*3/MM3 (ref 0.7–3.1)
LYMPHOCYTES NFR BLD AUTO: 16.7 % (ref 19.6–45.3)
MCH RBC QN AUTO: 33.9 PG (ref 26.6–33)
MCHC RBC AUTO-ENTMCNC: 36.1 G/DL (ref 31.5–35.7)
MCV RBC AUTO: 93.9 FL (ref 79–97)
MONOCYTES # BLD AUTO: 0.87 10*3/MM3 (ref 0.1–0.9)
MONOCYTES NFR BLD AUTO: 11.1 % (ref 5–12)
NEUTROPHILS NFR BLD AUTO: 5.52 10*3/MM3 (ref 1.7–7)
NEUTROPHILS NFR BLD AUTO: 70.2 % (ref 42.7–76)
NITRITE UR QL STRIP: NEGATIVE
NRBC BLD AUTO-RTO: 0 /100 WBC (ref 0–0.2)
PH UR STRIP.AUTO: 7.5 [PH] (ref 5–9)
PLATELET # BLD AUTO: 192 10*3/MM3 (ref 140–450)
PMV BLD AUTO: 9.2 FL (ref 6–12)
POTASSIUM SERPL-SCNC: 3.8 MMOL/L (ref 3.5–5.2)
PROT SERPL-MCNC: 7.7 G/DL (ref 6–8.5)
PROT UR QL STRIP: ABNORMAL
RBC # BLD AUTO: 4.28 10*6/MM3 (ref 3.77–5.28)
RBC # UR STRIP: ABNORMAL /HPF
REF LAB TEST METHOD: ABNORMAL
SALICYLATES SERPL-MCNC: <0.3 MG/DL
SODIUM SERPL-SCNC: 133 MMOL/L (ref 136–145)
SP GR UR STRIP: 1.02 (ref 1–1.03)
SQUAMOUS #/AREA URNS HPF: ABNORMAL /HPF
UROBILINOGEN UR QL STRIP: ABNORMAL
WBC # UR STRIP: ABNORMAL /HPF
WBC NRBC COR # BLD: 7.86 10*3/MM3 (ref 3.4–10.8)
WHOLE BLOOD HOLD COAG: NORMAL
WHOLE BLOOD HOLD SPECIMEN: NORMAL

## 2023-01-01 PROCEDURE — 74177 CT ABD & PELVIS W/CONTRAST: CPT

## 2023-01-01 PROCEDURE — 96374 THER/PROPH/DIAG INJ IV PUSH: CPT

## 2023-01-01 PROCEDURE — 25010000002 IOPAMIDOL 61 % SOLUTION: Performed by: STUDENT IN AN ORGANIZED HEALTH CARE EDUCATION/TRAINING PROGRAM

## 2023-01-01 PROCEDURE — 80143 DRUG ASSAY ACETAMINOPHEN: CPT | Performed by: STUDENT IN AN ORGANIZED HEALTH CARE EDUCATION/TRAINING PROGRAM

## 2023-01-01 PROCEDURE — 96375 TX/PRO/DX INJ NEW DRUG ADDON: CPT

## 2023-01-01 PROCEDURE — 25010000002 ONDANSETRON PER 1 MG: Performed by: STUDENT IN AN ORGANIZED HEALTH CARE EDUCATION/TRAINING PROGRAM

## 2023-01-01 PROCEDURE — 25010000002 HYDRALAZINE PER 20 MG: Performed by: STUDENT IN AN ORGANIZED HEALTH CARE EDUCATION/TRAINING PROGRAM

## 2023-01-01 PROCEDURE — 82009 KETONE BODYS QUAL: CPT | Performed by: STUDENT IN AN ORGANIZED HEALTH CARE EDUCATION/TRAINING PROGRAM

## 2023-01-01 PROCEDURE — 85025 COMPLETE CBC W/AUTO DIFF WBC: CPT

## 2023-01-01 PROCEDURE — 82077 ASSAY SPEC XCP UR&BREATH IA: CPT | Performed by: STUDENT IN AN ORGANIZED HEALTH CARE EDUCATION/TRAINING PROGRAM

## 2023-01-01 PROCEDURE — 81025 URINE PREGNANCY TEST: CPT | Performed by: STUDENT IN AN ORGANIZED HEALTH CARE EDUCATION/TRAINING PROGRAM

## 2023-01-01 PROCEDURE — 83690 ASSAY OF LIPASE: CPT

## 2023-01-01 PROCEDURE — 25010000002 LORAZEPAM PER 2 MG: Performed by: STUDENT IN AN ORGANIZED HEALTH CARE EDUCATION/TRAINING PROGRAM

## 2023-01-01 PROCEDURE — 80053 COMPREHEN METABOLIC PANEL: CPT

## 2023-01-01 PROCEDURE — 96361 HYDRATE IV INFUSION ADD-ON: CPT

## 2023-01-01 PROCEDURE — 80179 DRUG ASSAY SALICYLATE: CPT | Performed by: STUDENT IN AN ORGANIZED HEALTH CARE EDUCATION/TRAINING PROGRAM

## 2023-01-01 PROCEDURE — 99284 EMERGENCY DEPT VISIT MOD MDM: CPT

## 2023-01-01 PROCEDURE — 81001 URINALYSIS AUTO W/SCOPE: CPT

## 2023-01-01 RX ORDER — LORAZEPAM 2 MG/ML
2 INJECTION INTRAMUSCULAR ONCE
Status: COMPLETED | OUTPATIENT
Start: 2023-01-01 | End: 2023-01-01

## 2023-01-01 RX ORDER — SODIUM CHLORIDE 0.9 % (FLUSH) 0.9 %
10 SYRINGE (ML) INJECTION AS NEEDED
Status: DISCONTINUED | OUTPATIENT
Start: 2023-01-01 | End: 2023-01-02 | Stop reason: HOSPADM

## 2023-01-01 RX ORDER — HYDRALAZINE HYDROCHLORIDE 20 MG/ML
20 INJECTION INTRAMUSCULAR; INTRAVENOUS ONCE
Status: COMPLETED | OUTPATIENT
Start: 2023-01-01 | End: 2023-01-01

## 2023-01-01 RX ORDER — ONDANSETRON 2 MG/ML
4 INJECTION INTRAMUSCULAR; INTRAVENOUS ONCE
Status: COMPLETED | OUTPATIENT
Start: 2023-01-01 | End: 2023-01-01

## 2023-01-01 RX ADMIN — LORAZEPAM 2 MG: 2 INJECTION, SOLUTION INTRAMUSCULAR; INTRAVENOUS at 22:12

## 2023-01-01 RX ADMIN — HYDRALAZINE HYDROCHLORIDE 20 MG: 20 INJECTION INTRAMUSCULAR; INTRAVENOUS at 22:09

## 2023-01-01 RX ADMIN — ONDANSETRON 4 MG: 2 INJECTION INTRAMUSCULAR; INTRAVENOUS at 20:55

## 2023-01-01 RX ADMIN — SODIUM CHLORIDE 2000 ML: 9 INJECTION, SOLUTION INTRAVENOUS at 20:56

## 2023-01-01 RX ADMIN — IOPAMIDOL 90 ML: 612 INJECTION, SOLUTION INTRAVENOUS at 21:27

## 2023-01-01 NOTE — Clinical Note
[facility] EMERGENCY DEPARTMENT  [address] [place] [id number]  Phone: [phone]    Julia Gibson was seen and treated in our emergency department on 1/1/2023.  She may return to work on 01/05/2023.         Thank you for choosing [facility].    Pearl Martinez RN

## 2023-01-02 NOTE — DISCHARGE INSTRUCTIONS
Drink plenty of fluids.  Call the residents on Tuesday to establish care and for follow-up.  Take your medications as directed.  Return to ED as needed.

## 2023-01-02 NOTE — ED PROVIDER NOTES
Subjective   History of Present Illness  Patient presents with complaint of \"I about killed myself drinking so much alcohol.\"  Patient has been on a binge of alcohol drinking for the last 4 days.  She endorses vomiting, feeling very dehydrated, having chest pain, shortness of breath, periumbilical pain.  She has not been taking any treatment for her diabetes.  She denies dysuria.        Review of Systems   Constitutional: Negative for activity change and appetite change.   HENT: Negative for congestion and ear pain.    Eyes: Negative for pain and discharge.   Respiratory: Positive for shortness of breath. Negative for chest tightness.    Cardiovascular: Positive for chest pain. Negative for palpitations.   Gastrointestinal: Positive for abdominal pain, nausea and vomiting. Negative for abdominal distention.   Endocrine: Negative for cold intolerance and heat intolerance.   Genitourinary: Negative for difficulty urinating and dysuria.   Musculoskeletal: Negative for arthralgias and back pain.   Skin: Negative for color change and rash.   Allergic/Immunologic: Negative for environmental allergies and food allergies.   Neurological: Negative for dizziness and headaches.   Hematological: Negative for adenopathy. Does not bruise/bleed easily.   Psychiatric/Behavioral: Negative for agitation and confusion. The patient is nervous/anxious.        Past Medical History:   Diagnosis Date   • Acute bronchitis    • Acute conjunctivitis     left   • Acute pharyngitis    • Alcohol abuse    • Allergic rhinitis due to pollen    • Anxiety state    • Asthma    • Constipation    • Depressive disorder    • Diarrhea    • GERD (gastroesophageal reflux disease)    • Hypertensive disorder    • Insomnia    • Knee pain    • Migraine    • Nausea    • Nausea and vomiting    • Psychiatric illness    • Rib pain    • Tobacco dependence syndrome    • Type 2 diabetes mellitus (HCC)     uncontrolled   • URI (upper respiratory infection)    • Viral  intestinal infection, unspecified    • Wheezing        Allergies   Allergen Reactions   • Lisinopril Cough     Is currently prescribed lisinopril for her HTN, but isn't taking it due to cough; hasn't been back to see the MD   • Penicillins Unknown (See Comments)     Pt was [age] and unsure of reaction       Past Surgical History:   Procedure Laterality Date   • ABDOMINAL SURGERY     •  SECTION     • INJECTION OF MEDICATION  2016    Kenalog   • INJECTION OF MEDICATION  2015    Toradol   • PAP SMEAR  2010   • TUBAL ABDOMINAL LIGATION         Family History   Problem Relation Age of Onset   • Drug abuse Mother         addicted to pain pills,  from overdose   • Suicide Attempts Mother    • Heart attack Father    • Alcohol abuse Father    • Hepatitis Sister         IV drug use   • Drug abuse Sister    • Bipolar disorder Sister    • Colon cancer Maternal Grandmother          at age 19   • Heart disease Maternal Grandfather    • Tuberculosis Paternal Grandfather    • ADD / ADHD Son        Social History     Socioeconomic History   • Marital status:    • Number of children: 2   • Years of education: 12   Tobacco Use   • Smoking status: Every Day     Packs/day: 1.00     Years: 8.00     Pack years: 8.00     Types: Cigarettes   • Smokeless tobacco: Never   Substance and Sexual Activity   • Alcohol use: Yes     Comment: \"a fifth a day\", vodka daily   • Drug use: No     Types: Marijuana     Comment: when was younger   • Sexual activity: Never     Birth control/protection: Surgical           Objective   Physical Exam  Vitals and nursing note reviewed.   Constitutional:       Appearance: She is well-developed.   HENT:      Head: Normocephalic and atraumatic.      Nose: Nose normal.      Mouth/Throat:      Mouth: Mucous membranes are dry.   Eyes:      Pupils: Pupils are equal, round, and reactive to light.   Neck:      Thyroid: No thyromegaly.      Vascular: No JVD.      Trachea: No  tracheal deviation.   Cardiovascular:      Rate and Rhythm: Normal rate and regular rhythm.      Pulses:           Radial pulses are 2+ on the right side and 2+ on the left side.        Dorsalis pedis pulses are 2+ on the right side and 2+ on the left side.      Heart sounds: Normal heart sounds, S1 normal and S2 normal.   Pulmonary:      Effort: Pulmonary effort is normal.      Breath sounds: Normal breath sounds.   Abdominal:      General: Bowel sounds are normal.      Tenderness: There is abdominal tenderness (mid quadrants).   Musculoskeletal:         General: Normal range of motion.      Cervical back: Normal range of motion.   Skin:     General: Skin is warm and dry.      Capillary Refill: Capillary refill takes 2 to 3 seconds.   Neurological:      Mental Status: She is alert and oriented to person, place, and time.      GCS: GCS eye subscore is 4. GCS verbal subscore is 5. GCS motor subscore is 6.   Psychiatric:         Speech: Speech normal.         Behavior: Behavior normal.         Thought Content: Thought content normal.         Procedures           ED Course      Vitals:    01/01/23 2107 01/01/23 2200 01/01/23 2230 01/01/23 2259   BP: (!) 212/103 (!) 223/109 174/79 175/81   BP Location:   Right arm Right arm   Patient Position: Sitting Sitting Sitting Sitting   Pulse: 94 96 104 104   Resp: 20 20 20 20   Temp:       TempSrc:       SpO2: 94% 96% 95% 95%   Weight:       Height:         Lab Results (last 24 hours)     Procedure Component Value Units Date/Time    Salicylate Level [[id number]]  (Normal) Collected: 01/01/23 1920    Specimen: Blood Updated: 01/01/23 2110     Salicylate <0.3 mg/dL     Ethanol [[id number]] Collected: 01/01/23 1920    Specimen: Blood Updated: 01/01/23 2110     Ethanol <10 mg/dL      Ethanol % <0.010 %     Acetaminophen Level [[id number]]  (Normal) Collected: 01/01/23 1920    Specimen: Blood Updated: 01/01/23 2110     Acetaminophen <5.0 mcg/mL     Pregnancy, Urine - Urine, Clean Catch  [[id number]]  (Normal) Collected: 01/01/23 1930    Specimen: Urine, Clean Catch Updated: 01/01/23 2105     HCG, Urine QL Negative    Acetone [[id number]]  (Normal) Collected: 01/01/23 1920    Specimen: Blood Updated: 01/01/23 2042     Acetone Negative    Extra Tubes [[id number]] Collected: 01/01/23 1924    Specimen: Blood, Venous Line Updated: 01/01/23 2032    Narrative:      The following orders were created for panel order Extra Tubes.  Procedure                               Abnormality         Status                     ---------                               -----------         ------                     Green Top (Gel)[[id number]]                                  Final result                 Please view results for these tests on the individual orders.    Green Top (Gel) [[id number]] Collected: 01/01/23 1924    Specimen: Blood Updated: 01/01/23 2032     Extra Tube Hold for add-ons.     Comment: Auto resulted.       [place] Draw [[id number]] Collected: 01/01/23 1920    Specimen: Blood Updated: 01/01/23 2032    Narrative:      The following orders were created for panel order [place] Draw.  Procedure                               Abnormality         Status                     ---------                               -----------         ------                     Green Top (Gel)[[id number]]                                  Final result               Lavender Top[[id number]]                                     Final result               Gold Top - SST[[id number]]                                   Final result               Light Blue Top[[id number]]                                   Final result                 Please view results for these tests on the individual orders.    Light Blue Top [[id number]] Collected: 01/01/23 1920    Specimen: Blood Updated: 01/01/23 2032     Extra Tube Hold for add-ons.     Comment: Auto resulted       Green Top (Gel) [[id number]] Collected: 01/01/23 1920    Specimen: Blood Updated: 01/01/23 2032      Extra Tube Hold for add-ons.     Comment: Auto resulted.       Lavender Top [[id number]] Collected: 01/01/23 1920    Specimen: Blood Updated: 01/01/23 2032     Extra Tube hold for add-on     Comment: Auto resulted       Gold Top - SST [[id number]] Collected: 01/01/23 1920    Specimen: Blood Updated: 01/01/23 2032     Extra Tube Hold for add-ons.     Comment: Auto resulted.       Comprehensive Metabolic Panel [[id number]]  (Abnormal) Collected: 01/01/23 1920    Specimen: Blood Updated: 01/01/23 1946     Glucose 301 mg/dL      BUN 6 mg/dL      Creatinine 0.69 mg/dL      Sodium 133 mmol/L      Potassium 3.8 mmol/L      Chloride 93 mmol/L      CO2 22.0 mmol/L      Calcium 9.0 mg/dL      Total Protein 7.7 g/dL      Albumin 4.4 g/dL      ALT (SGPT) 112 U/L      AST (SGOT) 47 U/L      Alkaline Phosphatase 83 U/L      Total Bilirubin 0.5 mg/dL      Globulin 3.3 gm/dL      A/G Ratio 1.3 g/dL      BUN/Creatinine Ratio 8.7     Anion Gap 18.0 mmol/L      eGFR 109.9 mL/min/1.73      Comment: National Kidney Foundation and American Society of Nephrology (ASN) Task Force recommended calculation based on the Chronic Kidney Disease Epidemiology Collaboration (CKD-EPI) equation refit without adjustment for race.       Narrative:      GFR Normal >60  Chronic Kidney Disease <60  Kidney Failure <15      Lipase [[id number]]  (Normal) Collected: 01/01/23 1920    Specimen: Blood Updated: 01/01/23 1946     Lipase 34 U/L     Urinalysis, Microscopic Only - Urine, Clean Catch [[id number]]  (Abnormal) Collected: 01/01/23 1930    Specimen: Urine, Clean Catch Updated: 01/01/23 1940     RBC, UA 0-2 /HPF      WBC, UA 0-2 /HPF      Bacteria, UA None Seen /HPF      Squamous Epithelial Cells, UA 0-2 /HPF      Hyaline Casts, UA None Seen /LPF      Methodology Automated Microscopy    Urinalysis With Microscopic If Indicated (No Culture) - Urine, Clean Catch [[id number]]  (Abnormal) Collected: 01/01/23 1930    Specimen: Urine, Clean Catch Updated: 01/01/23  1938     Color, UA Yellow     Appearance, UA Clear     pH, UA 7.5     Specific Gravity, UA 1.023     Glucose, UA >=1000 mg/dL (3+)     Ketones, UA 80 mg/dL (3+)     Bilirubin, UA Negative     Blood, UA Negative     Protein, UA 30 mg/dL (1+)     Leuk Esterase, UA Negative     Nitrite, UA Negative     Urobilinogen, UA 0.2 E.U./dL    CBC & Differential [[id number]]  (Abnormal) Collected: 01/01/23 1920    Specimen: Blood Updated: 01/01/23 1925    Narrative:      The following orders were created for panel order CBC & Differential.  Procedure                               Abnormality         Status                     ---------                               -----------         ------                     CBC Auto Differential[[id number]]        Abnormal            Final result                 Please view results for these tests on the individual orders.    CBC Auto Differential [[id number]]  (Abnormal) Collected: 01/01/23 1920    Specimen: Blood Updated: 01/01/23 1925     WBC 7.86 10*3/mm3      RBC 4.28 10*6/mm3      Hemoglobin 14.5 g/dL      Hematocrit 40.2 %      MCV 93.9 fL      MCH 33.9 pg      MCHC 36.1 g/dL      RDW 13.7 %      RDW-SD 47.0 fl      MPV 9.2 fL      Platelets 192 10*3/mm3      Neutrophil % 70.2 %      Lymphocyte % 16.7 %      Monocyte % 11.1 %      Eosinophil % 1.1 %      Basophil % 0.5 %      Immature Grans % 0.4 %      Neutrophils, Absolute 5.52 10*3/mm3      Lymphocytes, Absolute 1.31 10*3/mm3      Monocytes, Absolute 0.87 10*3/mm3      Eosinophils, Absolute 0.09 10*3/mm3      Basophils, Absolute 0.04 10*3/mm3      Immature Grans, Absolute 0.03 10*3/mm3      nRBC 0.0 /100 WBC         CT Abdomen Pelvis With Contrast    Result Date: 1/1/2023  No acute process Electronically signed by:  Daniel Ohara MD  1/1/2023 10:56 PM CST Workstation: [id number]                                         Medical Decision Making  Patient with improved symptoms after 2 L of IV fluid and blood pressure medicine.  Labs  significant for hyperglycemia and serum ketone negative.  EtOH negative.  Patient endorses feeling better and would like to go home.  Close follow-up with residents to establish care and follow-up.    Dehydration: acute illness or injury  Hyperglycemia: acute illness or injury  Hypertension, unspecified type: chronic illness or injury  Amount and/or Complexity of Data Reviewed  Labs: ordered.  Radiology: ordered.      Risk  Prescription drug management.          Final diagnoses:   Dehydration   Hyperglycemia   Hypertension, unspecified type       ED Disposition  ED Disposition     ED Disposition   Discharge    Condition   Stable    Comment   --             [facility]  [address] [place] [address] [phone]  Call in 1 day  to establish care and for follow up         Medication List      No changes were made to your prescriptions during this visit.       This document has been electronically signed by Tra Verdin MD on January 2, 2023 01:55 CST    Tra Verdin MD   Part of this note may be an electronic transcription/translation of spoken language to printed text using the Dragon Dictation System.        Tra Verdin MD  01/02/23 0156

## 2023-01-04 ENCOUNTER — OFFICE VISIT (OUTPATIENT)
Dept: FAMILY MEDICINE CLINIC | Facility: CLINIC | Age: 43
End: 2023-01-04
Payer: COMMERCIAL

## 2023-01-04 VITALS
HEART RATE: 106 BPM | WEIGHT: 204 LBS | OXYGEN SATURATION: 98 % | HEIGHT: 66 IN | DIASTOLIC BLOOD PRESSURE: 110 MMHG | BODY MASS INDEX: 32.78 KG/M2 | SYSTOLIC BLOOD PRESSURE: 170 MMHG

## 2023-01-04 DIAGNOSIS — K21.9 GASTROESOPHAGEAL REFLUX DISEASE WITHOUT ESOPHAGITIS: ICD-10-CM

## 2023-01-04 DIAGNOSIS — F41.9 ANXIETY: ICD-10-CM

## 2023-01-04 DIAGNOSIS — R11.0 NAUSEA: ICD-10-CM

## 2023-01-04 DIAGNOSIS — Z79.4 TYPE 2 DIABETES MELLITUS WITH HYPERGLYCEMIA, WITH LONG-TERM CURRENT USE OF INSULIN: Primary | ICD-10-CM

## 2023-01-04 DIAGNOSIS — J41.0 SIMPLE CHRONIC BRONCHITIS: ICD-10-CM

## 2023-01-04 DIAGNOSIS — I10 PRIMARY HYPERTENSION: ICD-10-CM

## 2023-01-04 DIAGNOSIS — F33.1 MODERATE EPISODE OF RECURRENT MAJOR DEPRESSIVE DISORDER: ICD-10-CM

## 2023-01-04 DIAGNOSIS — E11.65 TYPE 2 DIABETES MELLITUS WITH HYPERGLYCEMIA, WITH LONG-TERM CURRENT USE OF INSULIN: Primary | ICD-10-CM

## 2023-01-04 PROCEDURE — 99213 OFFICE O/P EST LOW 20 MIN: CPT | Performed by: CHIROPRACTOR

## 2023-01-04 RX ORDER — HYDROXYZINE HYDROCHLORIDE 25 MG/1
25 TABLET, FILM COATED ORAL 3 TIMES DAILY PRN
Qty: 90 TABLET | Refills: 2 | Status: SHIPPED | OUTPATIENT
Start: 2023-01-04

## 2023-01-04 RX ORDER — FOLIC ACID 1 MG/1
1 TABLET ORAL DAILY
Qty: 30 TABLET | Refills: 0 | Status: SHIPPED | OUTPATIENT
Start: 2023-01-04

## 2023-01-04 RX ORDER — ALBUTEROL SULFATE 90 UG/1
2 AEROSOL, METERED RESPIRATORY (INHALATION)
Qty: 18 G | Refills: 2 | Status: SHIPPED | OUTPATIENT
Start: 2023-01-04 | End: 2023-03-31 | Stop reason: SDUPTHER

## 2023-01-04 RX ORDER — LOSARTAN POTASSIUM 25 MG/1
75 TABLET ORAL
Qty: 90 TABLET | Refills: 2 | Status: SHIPPED | OUTPATIENT
Start: 2023-01-04

## 2023-01-04 RX ORDER — CITALOPRAM 40 MG/1
40 TABLET ORAL DAILY
Qty: 30 TABLET | Refills: 2 | Status: SHIPPED | OUTPATIENT
Start: 2023-01-04

## 2023-01-04 RX ORDER — ONDANSETRON 4 MG/1
4 TABLET, ORALLY DISINTEGRATING ORAL EVERY 8 HOURS PRN
Qty: 30 TABLET | Refills: 0 | Status: SHIPPED | OUTPATIENT
Start: 2023-01-04

## 2023-01-04 RX ORDER — METHION/INOS/CHOL BT/B COM/LIV 110MG-86MG
100 CAPSULE ORAL DAILY
Qty: 30 EACH | Refills: 0 | Status: SHIPPED | OUTPATIENT
Start: 2023-01-04 | End: 2023-02-27

## 2023-01-04 RX ORDER — DIPHENOXYLATE HYDROCHLORIDE AND ATROPINE SULFATE 2.5; .025 MG/1; MG/1
1 TABLET ORAL DAILY
Qty: 30 TABLET | Refills: 0 | Status: SHIPPED | OUTPATIENT
Start: 2023-01-04 | End: 2023-02-27

## 2023-01-04 RX ORDER — INSULIN ASPART 100 [IU]/ML
INJECTION, SOLUTION INTRAVENOUS; SUBCUTANEOUS
Qty: 15 ML | Refills: 2 | Status: SHIPPED | OUTPATIENT
Start: 2023-01-04

## 2023-01-04 RX ORDER — OMEPRAZOLE 40 MG/1
40 CAPSULE, DELAYED RELEASE ORAL DAILY
Qty: 30 CAPSULE | Refills: 2 | Status: SHIPPED | OUTPATIENT
Start: 2023-01-04

## 2023-01-04 NOTE — PROGRESS NOTES
Family Medicine Residency  Arthur Mercado MD    Subjective:     Julia Gibson is a 42 y.o. female who presents for dehydration, hyperglycemia, and hypertension.  She was in the local ED on  where she was treated for these issues.  She had been on alcohol binge for 4 days and admitted to not taking her diabetes medication.  She was successfully treated with administrative antihypertensive medication and 2 L of IV fluids.  After discharge was advised to follow-up with new PCP.  After discussion with the patient today she endorses that she has not been taking any of her medications for the last month.  There is no particular reason why.  She simply ran out of them and had no appoint with her PCP to get them refilled.  She endorses the need for a new PCP and is happy to have me fill that role for her.  I gave her my card and assured her that she could contact me at any time.  Are going to schedule a follow-up appointment in 1 month    The following portions of the patient's history were reviewed and updated as appropriate: allergies, current medications, past family history, past medical history, past social history, past surgical history and problem list.    Past Medical Hx:  Past Medical History:   Diagnosis Date   • Acute bronchitis    • Acute conjunctivitis     left   • Acute pharyngitis    • Alcohol abuse    • Allergic rhinitis due to pollen    • Anxiety state    • Asthma    • Constipation    • Depressive disorder    • Diarrhea    • GERD (gastroesophageal reflux disease)    • Hypertensive disorder    • Insomnia    • Knee pain    • Migraine    • Nausea    • Nausea and vomiting    • Psychiatric illness    • Rib pain    • Tobacco dependence syndrome    • Type 2 diabetes mellitus (HCC)     uncontrolled   • URI (upper respiratory infection)    • Viral intestinal infection, unspecified    • Wheezing        Past Surgical Hx:  Past Surgical History:   Procedure Laterality Date   • ABDOMINAL SURGERY     •   SECTION     • INJECTION OF MEDICATION  01/17/2016    Kenalog   • INJECTION OF MEDICATION  07/11/2015    Toradol   • PAP SMEAR  04/05/2010   • TUBAL ABDOMINAL LIGATION  1999       Current Meds:    Current Outpatient Medications:   •  albuterol sulfate  (90 Base) MCG/ACT inhaler, Inhale 2 puffs 4 (Four) Times a Day. Indications: Asthma, Disp: 18 g, Rfl: 2  •  Blood Glucose Monitoring Suppl (ONE TOUCH ULTRA 2) W/DEVICE kit, , Disp: , Rfl: 0  •  citalopram (CeleXA) 40 MG tablet, Take 1 tablet by mouth Daily., Disp: 30 tablet, Rfl: 2  •  folic acid (FOLVITE) 1 MG tablet, Take 1 tablet by mouth Daily., Disp: 30 tablet, Rfl: 0  •  insulin detemir (LEVEMIR) 100 UNIT/ML injection, Inject 10 Units under the skin into the appropriate area as directed Every Night. Indications: Type 2 Diabetes, Disp: , Rfl: 12  •  Lancets 28G misc, 1 each., Disp: , Rfl:   •  LORazepam (Ativan) 2 MG tablet, Take 2 tablets by mouth twice a day for 2 days, then 1 tablet daily for 1 day., Disp: 5 tablet, Rfl: 0  •  losartan (COZAAR) 25 MG tablet, Take 3 tablets by mouth Daily., Disp: 90 tablet, Rfl: 2  •  metFORMIN (GLUCOPHAGE) 1000 MG tablet, Take 1 tablet by mouth 2 (Two) Times a Day. Indications: Type 2 Diabetes, Disp: 60 tablet, Rfl: 2  •  multivitamin (THERAGRAN) tablet tablet, Take 1 tablet by mouth Daily., Disp: 30 tablet, Rfl: 0  •  naltrexone (DEPADE) 50 MG tablet, Take 1 tablet by mouth Daily., Disp: 30 tablet, Rfl: 0  •  omeprazole (priLOSEC) 40 MG capsule, Take 1 capsule by mouth Daily. Indications: Gastroesophageal Reflux Disease, Disp: 30 capsule, Rfl: 2  •  ondansetron ODT (Zofran ODT) 4 MG disintegrating tablet, Dissolve 1 tablet on the tongue Every 8 (Eight) Hours As Needed for Nausea or Vomiting., Disp: 30 tablet, Rfl: 0  •  thiamine (VITAMIN B-1) 100 MG tablet tablet, Take 1 tablet by mouth Daily., Disp: 30 each, Rfl: 0  •  hydrOXYzine (ATARAX) 25 MG tablet, Take 1 tablet by mouth 3 (Three) Times a Day As Needed for  Itching., Disp: 90 tablet, Rfl: 2  •  insulin aspart (NovoLOG FlexPen) 100 UNIT/ML solution pen-injector sc pen, Take 0-9 units 3 times a day with meals., Disp: 15 mL, Rfl: 2    Allergies:  Allergies   Allergen Reactions   • Insulin Glargine Other (See Comments)     Did not tolerate well   • Lisinopril Cough     Is currently prescribed lisinopril for her HTN, but isn't taking it due to cough; hasn't been back to see the MD   • Penicillins Unknown (See Comments)     Pt was five and unsure of reaction       Family Hx:  Family History   Problem Relation Age of Onset   • Drug abuse Mother         addicted to pain pills,  from overdose   • Suicide Attempts Mother    • Heart attack Father    • Alcohol abuse Father    • Hepatitis Sister         IV drug use   • Drug abuse Sister    • Bipolar disorder Sister    • Colon cancer Maternal Grandmother          at age 19   • Heart disease Maternal Grandfather    • Tuberculosis Paternal Grandfather    • ADD / ADHD Son         Social History:  Social History     Socioeconomic History   • Marital status:    • Number of children: 2   • Years of education: 12   Tobacco Use   • Smoking status: Every Day     Packs/day: 1.00     Years: 8.00     Pack years: 8.00     Types: Cigarettes   • Smokeless tobacco: Never   Substance and Sexual Activity   • Alcohol use: Yes     Comment: \"a fifth a day\", vodka daily   • Drug use: No     Types: Marijuana     Comment: when was younger   • Sexual activity: Never     Birth control/protection: Surgical       Review of Systems  Review of Systems   Constitutional: Negative for chills, fatigue and fever.   HENT: Negative for congestion, rhinorrhea and trouble swallowing.    Eyes: Negative for visual disturbance.   Respiratory: Negative for cough and shortness of breath.    Cardiovascular: Negative for chest pain and palpitations.   Gastrointestinal: Negative for abdominal pain and blood in stool.   Genitourinary: Negative for dysuria and  hematuria.   Musculoskeletal: Negative for gait problem.   Skin: Negative for color change.   Neurological: Negative for dizziness and headaches.   Psychiatric/Behavioral: Positive for dysphoric mood and sleep disturbance. The patient is nervous/anxious.        Objective:     BP (!) 170/110   Pulse 106   Ht 167.6 cm (66\")   Wt 92.5 kg (204 lb)   LMP 12/20/2022 (Approximate)   SpO2 98%   BMI 32.93 kg/m²   Physical Exam  Constitutional:       General: She is not in acute distress.     Appearance: She is not diaphoretic.   HENT:      Head: Atraumatic.      Right Ear: External ear normal.      Left Ear: External ear normal.      Nose: No rhinorrhea.      Mouth/Throat:      Mouth: Mucous membranes are moist.      Pharynx: Oropharynx is clear.   Eyes:      General: No scleral icterus.        Right eye: No discharge.         Left eye: No discharge.   Neck:      Vascular: No carotid bruit.   Cardiovascular:      Rate and Rhythm: Regular rhythm. Tachycardia present.      Pulses: Normal pulses.      Heart sounds: No murmur heard.     Comments: Patient is hypertensive today /110.  Tachycardia to 106.  Pulmonary:      Effort: No respiratory distress.      Breath sounds: No wheezing.   Abdominal:      General: Bowel sounds are normal.      Palpations: There is no mass.      Tenderness: There is no abdominal tenderness.   Musculoskeletal:      Right lower leg: No edema.      Left lower leg: No edema.   Skin:     Capillary Refill: Capillary refill takes less than 2 seconds.      Findings: No rash.   Neurological:      Mental Status: She is alert. Mental status is at baseline.   Psychiatric:         Thought Content: Thought content normal.      Comments: Patient is somewhat anxious.          Assessment/Plan:      Diagnoses and all orders for this visit:  Patient comes in today for follow-up of an ED visit which occurred on 1/1.  At that time she had been on alcoholic binge for 4 days and was dehydrated.  She endorsed  that she had not been taking her medications.  On today's visit she reports that she has not been taking her medications for the past month.  She was seeing an APRN but apparently has not had a PCP in some time and did not get the medications refilled.  She would like to have me function as her new PCP and I am happy to take on the challenge.  I am going to refill her needed medications today and meet back with her in 1 month at which time we will spend some time going over her readings from her glucometer.  She did not bring it in with her today.  We may also at that visit consider having a CGM system installed for better control of her diabetes if needed.  Since she has not been taking her blood pressure medications for over a month we will not make any changes today.  Follow-up on visit in 1 month for any needed corrections.    1. Type 2 diabetes mellitus with hyperglycemia, with long-term current use of insulin (HCC) (Primary)  -     insulin detemir (LEVEMIR) 100 UNIT/ML injection; Inject 10 Units under the skin into the appropriate area as directed Every Night. Indications: Type 2 Diabetes; Refill: 12  -     metFORMIN (GLUCOPHAGE) 1000 MG tablet; Take 1 tablet by mouth 2 (Two) Times a Day. Indications: Type 2 Diabetes  Dispense: 60 tablet; Refill: 2  -     insulin aspart (NovoLOG FlexPen) 100 UNIT/ML solution pen-injector sc pen; Take 0-9 units 3 times a day with meals.  Dispense: 15 mL; Refill: 2    2. Gastroesophageal reflux disease without esophagitis  -     folic acid (FOLVITE) 1 MG tablet; Take 1 tablet by mouth Daily.  Dispense: 30 tablet; Refill: 0  -     multivitamin (THERAGRAN) tablet tablet; Take 1 tablet by mouth Daily.  Dispense: 30 tablet; Refill: 0  -     omeprazole (priLOSEC) 40 MG capsule; Take 1 capsule by mouth Daily. Indications: Gastroesophageal Reflux Disease  Dispense: 30 capsule; Refill: 2  -     thiamine (VITAMIN B-1) 100 MG tablet tablet; Take 1 tablet by mouth Daily.  Dispense: 30  each; Refill: 0    3. Moderate episode of recurrent major depressive disorder (HCC)  -     citalopram (CeleXA) 40 MG tablet; Take 1 tablet by mouth Daily.  Dispense: 30 tablet; Refill: 2    4. Anxiety  -     hydrOXYzine (ATARAX) 25 MG tablet; Take 1 tablet by mouth 3 (Three) Times a Day As Needed for Itching.  Dispense: 90 tablet; Refill: 2    5. Nausea  -     folic acid (FOLVITE) 1 MG tablet; Take 1 tablet by mouth Daily.  Dispense: 30 tablet; Refill: 0  -     multivitamin (THERAGRAN) tablet tablet; Take 1 tablet by mouth Daily.  Dispense: 30 tablet; Refill: 0  -     ondansetron ODT (Zofran ODT) 4 MG disintegrating tablet; Dissolve 1 tablet on the tongue Every 8 (Eight) Hours As Needed for Nausea or Vomiting.  Dispense: 30 tablet; Refill: 0  -     thiamine (VITAMIN B-1) 100 MG tablet tablet; Take 1 tablet by mouth Daily.  Dispense: 30 each; Refill: 0    6. Primary hypertension  -     losartan (COZAAR) 25 MG tablet; Take 3 tablets by mouth Daily.  Dispense: 90 tablet; Refill: 2    7. Simple chronic bronchitis (HCC)  -     albuterol sulfate  (90 Base) MCG/ACT inhaler; Inhale 2 puffs 4 (Four) Times a Day. Indications: Asthma  Dispense: 18 g; Refill: 2       Follow-up:     Return in about 4 weeks (around 2/1/2023) for Recheck.    Preventative:  Health Maintenance   Topic Date Due   • Hepatitis B (1 of 3 - 3-dose series) Never done   • COVID-19 Vaccine (1) Never done   • Pneumococcal Vaccine 0-64 (2 - PCV) 10/29/2016   • URINE MICROALBUMIN  10/29/2016   • HEPATITIS C SCREENING  Never done   • ANNUAL PHYSICAL  Never done   • DIABETIC FOOT EXAM  Never done   • PAP SMEAR  01/06/2021   • DIABETIC EYE EXAM  Never done   • HEMOGLOBIN A1C  01/12/2022   • INFLUENZA VACCINE  08/01/2022   • TDAP/TD VACCINES (2 - Td or Tdap) 07/11/2025         Alcohol use:  reports current alcohol use.  Nicotine status  reports that she has been smoking cigarettes. She has a 8.00 pack-year smoking history. She has never used smokeless  tobacco.     Goals    None           This document has been electronically signed by Arthur Mercado MD on January 4, 2023 17:21 CST       wheelchair

## 2023-01-16 ENCOUNTER — APPOINTMENT (OUTPATIENT)
Dept: GENERAL RADIOLOGY | Facility: HOSPITAL | Age: 43
End: 2023-01-16
Payer: COMMERCIAL

## 2023-01-16 ENCOUNTER — HOSPITAL ENCOUNTER (EMERGENCY)
Facility: HOSPITAL | Age: 43
Discharge: HOME | End: 2023-01-16
Attending: STUDENT IN AN ORGANIZED HEALTH CARE EDUCATION/TRAINING PROGRAM | Admitting: STUDENT IN AN ORGANIZED HEALTH CARE EDUCATION/TRAINING PROGRAM
Payer: COMMERCIAL

## 2023-01-16 VITALS
RESPIRATION RATE: 16 BRPM | WEIGHT: 204 LBS | OXYGEN SATURATION: 97 % | BODY MASS INDEX: 32.78 KG/M2 | SYSTOLIC BLOOD PRESSURE: 153 MMHG | HEIGHT: 66 IN | DIASTOLIC BLOOD PRESSURE: 91 MMHG | TEMPERATURE: 97.9 F | HEART RATE: 101 BPM

## 2023-01-16 DIAGNOSIS — S62.647A CLOSED NONDISPLACED FRACTURE OF PROXIMAL PHALANX OF LEFT LITTLE FINGER, INITIAL ENCOUNTER: Primary | ICD-10-CM

## 2023-01-16 PROCEDURE — 73130 X-RAY EXAM OF HAND: CPT

## 2023-01-16 PROCEDURE — 99283 EMERGENCY DEPT VISIT LOW MDM: CPT

## 2023-01-16 RX ORDER — CLONIDINE HYDROCHLORIDE 0.1 MG/1
0.1 TABLET ORAL ONCE
Status: COMPLETED | OUTPATIENT
Start: 2023-01-16 | End: 2023-01-16

## 2023-01-16 RX ORDER — CLONIDINE HYDROCHLORIDE 0.2 MG/1
0.2 TABLET ORAL ONCE
Status: COMPLETED | OUTPATIENT
Start: 2023-01-16 | End: 2023-01-16

## 2023-01-16 RX ADMIN — CLONIDINE HYDROCHLORIDE 0.2 MG: 0.2 TABLET ORAL at 14:47

## 2023-01-16 RX ADMIN — CLONIDINE HYDROCHLORIDE 0.1 MG: 0.1 TABLET ORAL at 13:22

## 2023-01-16 NOTE — ED PROVIDER NOTES
Subjective   History of Present Illness  42-year-old female comes to the ER with left hand injury after she accidentally closed the car door on it.  The accident was yesterday.  She noticed bruising today.  No wound.  She has some difficulty moving her fingers particularly her pinky.    History provided by:  Patient   used: No        Review of Systems   Constitutional: Negative for chills and fever.   HENT: Negative for drooling.    Eyes: Negative for redness.   Respiratory: Negative for shortness of breath.    Cardiovascular: Negative for chest pain.   Gastrointestinal: Negative for vomiting.   Genitourinary: Negative for flank pain.   Musculoskeletal: Positive for arthralgias.   Skin: Positive for color change.   Neurological: Negative for seizures.   Psychiatric/Behavioral: Negative for confusion.       Past Medical History:   Diagnosis Date   • Acute bronchitis    • Acute conjunctivitis     left   • Acute pharyngitis    • Alcohol abuse    • Allergic rhinitis due to pollen    • Anxiety state    • Asthma    • Constipation    • Depressive disorder    • Diarrhea    • GERD (gastroesophageal reflux disease)    • Hypertensive disorder    • Insomnia    • Knee pain    • Migraine    • Nausea    • Nausea and vomiting    • Psychiatric illness    • Rib pain    • Tobacco dependence syndrome    • Type 2 diabetes mellitus (HCC)     uncontrolled   • URI (upper respiratory infection)    • Viral intestinal infection, unspecified    • Wheezing        Allergies   Allergen Reactions   • Insulin Glargine Other (See Comments)     Did not tolerate well   • Lisinopril Cough     Is currently prescribed lisinopril for her HTN, but isn't taking it due to cough; hasn't been back to see the MD   • Penicillins Unknown (See Comments)     Pt was five and unsure of reaction       Past Surgical History:   Procedure Laterality Date   • ABDOMINAL SURGERY     •  SECTION     • INJECTION OF MEDICATION  2016    Kenalog  "  • INJECTION OF MEDICATION  2015    Toradol   • PAP SMEAR  2010   • TUBAL ABDOMINAL LIGATION         Family History   Problem Relation Age of Onset   • Drug abuse Mother         addicted to pain pills,  from overdose   • Suicide Attempts Mother    • Heart attack Father    • Alcohol abuse Father    • Hepatitis Sister         IV drug use   • Drug abuse Sister    • Bipolar disorder Sister    • Colon cancer Maternal Grandmother          at age 19   • Heart disease Maternal Grandfather    • Tuberculosis Paternal Grandfather    • ADD / ADHD Son        Social History     Socioeconomic History   • Marital status:    • Number of children: 2   • Years of education: 12   Tobacco Use   • Smoking status: Every Day     Packs/day: 1.00     Years: 8.00     Pack years: 8.00     Types: Cigarettes   • Smokeless tobacco: Never   Substance and Sexual Activity   • Alcohol use: Yes     Comment: \"a fifth a day\", vodka daily   • Drug use: No     Types: Marijuana     Comment: when was younger   • Sexual activity: Never     Birth control/protection: Surgical           Objective    Vitals:    23 1503 23 1515 23 1530 23 1545   BP: (!) 194/104 (!) 184/117 (!) 163/103 153/91   BP Location:       Patient Position:       Pulse:       Resp:       Temp:       SpO2:       Weight:       Height:           Physical Exam  Vitals and nursing note reviewed.   Constitutional:       General: She is not in acute distress.     Appearance: She is well-developed. She is obese. She is not diaphoretic.   HENT:      Head: Normocephalic.   Eyes:      Conjunctiva/sclera: Conjunctivae normal.   Pulmonary:      Effort: Pulmonary effort is normal. No accessory muscle usage or respiratory distress.   Musculoskeletal:         General: Swelling, tenderness, deformity and signs of injury present.        Arms:    Skin:     General: Skin is warm and dry.      Capillary Refill: Capillary refill takes less than 2 seconds. "      Findings: Bruising and ecchymosis present.   Neurological:      Sensory: No sensory deficit.      Motor: No weakness.      Coordination: Coordination normal.         Procedures           ED Course      XR Hand 3+ View Left   Final Result   Acute, angulated fracture through the proximal left   fifth proximal phalanx.      Electronically signed by:  Kedar Kimball MD  1/16/2023 2:06 PM CST   Workstation: [id number]                                             Medical Decision Making  Vital signs are stable, afebrile.  Neurologically intact.  X-ray shows 1/5 proximal phalanx fracture.  Ulnar gutter splint applied.  Offered pain medicine, but patient declined.  Recommend follow-up with PCP and Ortho.  Blood pressure improved.  Return precautions given.  Patient states understanding and is agreeable to the plan.    Closed nondisplaced fracture of proximal phalanx of left little finger, initial encounter: acute illness or injury  Amount and/or Complexity of Data Reviewed  Radiology: ordered and independent interpretation performed. Decision-making details documented in ED Course.      Risk  Prescription drug management.          Final diagnoses:   Closed nondisplaced fracture of proximal phalanx of left little finger, initial encounter       ED Disposition  ED Disposition     ED Disposition   Discharge    Condition   Stable    Comment   --             [facility] - FAMILY MEDICINE  [address] [place] [address] [phone]  Schedule an appointment as soon as possible for a visit in 2 days  ER follow up    [facility] ORTHOPEDICS  [address] [place] [address] [place] [id number]  [phone]  Schedule an appointment as soon as possible for a visit in 2 days  ER follow up         Medication List      No changes were made to your prescriptions during this visit.          Shane Villasenor MD  01/16/23 [id number]

## 2023-01-16 NOTE — Clinical Note
[facility] EMERGENCY DEPARTMENT  [address] [place] [id number]  Phone: [phone]    Julia Gibson was seen and treated in our emergency department on 1/16/2023.  She may return to work on 01/19/2023.         Thank you for choosing [facility].    Shane Villasenor MD

## 2023-01-16 NOTE — Clinical Note
[facility] EMERGENCY DEPARTMENT  [address] [place] [address]  Phone: [phone]    Julia Gibson was seen and treated in our emergency department on 1/16/2023.  She may return to work on 01/19/2023.         Thank you for choosing [facility].    Shane Villasenor MD

## 2023-01-16 NOTE — Clinical Note
[facility] EMERGENCY DEPARTMENT  [address] [place] [address]  Phone: [phone]    Julia Gibson was seen and treated in our emergency department on 1/16/2023.  She may return to work on 01/19/2023.         Thank you for choosing [facility].    Shane Villasenor MD

## 2023-01-16 NOTE — Clinical Note
[facility] EMERGENCY DEPARTMENT  [address] [place] [id number]  Phone: [phone]    Julia Gibson was seen and treated in our emergency department on 1/16/2023.  She may return to work on 01/19/2023.         Thank you for choosing [facility].    Shane Villasenor MD

## 2023-02-08 NOTE — PROGRESS NOTES
I have seen the patient.  I have reviewed the notes, assessments, and/or procedures performed by Arthur Mercado MD during office visit I concur with her/his documentation and assessment and plan for Julia Gibson.            This document has been electronically signed by Gibran Goldberg MD on February 8, 2023 15:51 CST

## 2023-02-12 ENCOUNTER — HOSPITAL ENCOUNTER (EMERGENCY)
Facility: HOSPITAL | Age: 43
Discharge: HOME | End: 2023-02-12
Attending: STUDENT IN AN ORGANIZED HEALTH CARE EDUCATION/TRAINING PROGRAM | Admitting: STUDENT IN AN ORGANIZED HEALTH CARE EDUCATION/TRAINING PROGRAM
Payer: COMMERCIAL

## 2023-02-12 VITALS
OXYGEN SATURATION: 99 % | TEMPERATURE: 98.2 F | WEIGHT: 204 LBS | SYSTOLIC BLOOD PRESSURE: 160 MMHG | HEART RATE: 100 BPM | RESPIRATION RATE: 16 BRPM | BODY MASS INDEX: 32.78 KG/M2 | HEIGHT: 66 IN | DIASTOLIC BLOOD PRESSURE: 100 MMHG

## 2023-02-12 DIAGNOSIS — I15.2 HYPERTENSION DUE TO ENDOCRINE DISORDER: ICD-10-CM

## 2023-02-12 DIAGNOSIS — F10.930 ALCOHOL WITHDRAWAL SYNDROME WITHOUT COMPLICATION: Primary | ICD-10-CM

## 2023-02-12 LAB
ACETONE BLD QL: NEGATIVE
ALBUMIN SERPL-MCNC: 4.9 G/DL (ref 3.5–5.2)
ALBUMIN/GLOB SERPL: 1.4 G/DL
ALP SERPL-CCNC: 96 U/L (ref 39–117)
ALT SERPL W P-5'-P-CCNC: 108 U/L (ref 1–33)
AMPHET+METHAMPHET UR QL: NEGATIVE
AMPHETAMINES UR QL: NEGATIVE
ANION GAP SERPL CALCULATED.3IONS-SCNC: 21 MMOL/L (ref 5–15)
AST SERPL-CCNC: 106 U/L (ref 1–32)
BACTERIA UR QL AUTO: ABNORMAL /HPF
BARBITURATES UR QL SCN: NEGATIVE
BASOPHILS # BLD AUTO: 0.07 10*3/MM3 (ref 0–0.2)
BASOPHILS NFR BLD AUTO: 0.7 % (ref 0–1.5)
BENZODIAZ UR QL SCN: NEGATIVE
BILIRUB SERPL-MCNC: 0.9 MG/DL (ref 0–1.2)
BILIRUB UR QL STRIP: NEGATIVE
BUN SERPL-MCNC: 9 MG/DL (ref 6–20)
BUN/CREAT SERPL: 11.4 (ref 7–25)
BUPRENORPHINE SERPL-MCNC: NEGATIVE NG/ML
CALCIUM SPEC-SCNC: 9.9 MG/DL (ref 8.6–10.5)
CANNABINOIDS SERPL QL: NEGATIVE
CHLORIDE SERPL-SCNC: 91 MMOL/L (ref 98–107)
CK MB SERPL-CCNC: 7.41 NG/ML
CK SERPL-CCNC: 282 U/L (ref 20–180)
CLARITY UR: CLEAR
CO2 SERPL-SCNC: 22 MMOL/L (ref 22–29)
COCAINE UR QL: NEGATIVE
COLOR UR: YELLOW
CREAT SERPL-MCNC: 0.79 MG/DL (ref 0.57–1)
D-LACTATE SERPL-SCNC: 1.5 MMOL/L (ref 0.5–2)
D-LACTATE SERPL-SCNC: 4 MMOL/L (ref 0.5–2)
DEPRECATED RDW RBC AUTO: 45 FL (ref 37–54)
EGFRCR SERPLBLD CKD-EPI 2021: 94.7 ML/MIN/1.73
EOSINOPHIL # BLD AUTO: 0.01 10*3/MM3 (ref 0–0.4)
EOSINOPHIL NFR BLD AUTO: 0.1 % (ref 0.3–6.2)
ERYTHROCYTE [DISTWIDTH] IN BLOOD BY AUTOMATED COUNT: 12.5 % (ref 12.3–15.4)
ETHANOL BLD-MCNC: <10 MG/DL (ref 0–10)
ETHANOL UR QL: <0.01 %
GEN 5 2HR TROPONIN T REFLEX: 13 NG/L
GLOBULIN UR ELPH-MCNC: 3.5 GM/DL
GLUCOSE BLDC GLUCOMTR-MCNC: 338 MG/DL (ref 70–130)
GLUCOSE SERPL-MCNC: 340 MG/DL (ref 65–99)
GLUCOSE UR STRIP-MCNC: ABNORMAL MG/DL
HCT VFR BLD AUTO: 41.8 % (ref 34–46.6)
HGB BLD-MCNC: 14.3 G/DL (ref 12–15.9)
HGB UR QL STRIP.AUTO: ABNORMAL
HOLD SPECIMEN: NORMAL
HOLD SPECIMEN: NORMAL
HYALINE CASTS UR QL AUTO: ABNORMAL /LPF
IMM GRANULOCYTES # BLD AUTO: 0.03 10*3/MM3 (ref 0–0.05)
IMM GRANULOCYTES NFR BLD AUTO: 0.3 % (ref 0–0.5)
KETONES UR QL STRIP: ABNORMAL
LEUKOCYTE ESTERASE UR QL STRIP.AUTO: NEGATIVE
LIPASE SERPL-CCNC: 31 U/L (ref 13–60)
LYMPHOCYTES # BLD AUTO: 0.83 10*3/MM3 (ref 0.7–3.1)
LYMPHOCYTES NFR BLD AUTO: 8.7 % (ref 19.6–45.3)
MAGNESIUM SERPL-MCNC: 1.4 MG/DL (ref 1.6–2.6)
MCH RBC QN AUTO: 33.3 PG (ref 26.6–33)
MCHC RBC AUTO-ENTMCNC: 34.2 G/DL (ref 31.5–35.7)
MCV RBC AUTO: 97.2 FL (ref 79–97)
METHADONE UR QL SCN: NEGATIVE
MONOCYTES # BLD AUTO: 0.61 10*3/MM3 (ref 0.1–0.9)
MONOCYTES NFR BLD AUTO: 6.4 % (ref 5–12)
NEUTROPHILS NFR BLD AUTO: 7.94 10*3/MM3 (ref 1.7–7)
NEUTROPHILS NFR BLD AUTO: 83.8 % (ref 42.7–76)
NITRITE UR QL STRIP: NEGATIVE
NRBC BLD AUTO-RTO: 0 /100 WBC (ref 0–0.2)
OPIATES UR QL: NEGATIVE
OXYCODONE UR QL SCN: NEGATIVE
PCP UR QL SCN: NEGATIVE
PH UR STRIP.AUTO: 7 [PH] (ref 5–9)
PLATELET # BLD AUTO: 230 10*3/MM3 (ref 140–450)
PMV BLD AUTO: 9.4 FL (ref 6–12)
POTASSIUM SERPL-SCNC: 4.2 MMOL/L (ref 3.5–5.2)
PROPOXYPH UR QL: NEGATIVE
PROT SERPL-MCNC: 8.4 G/DL (ref 6–8.5)
PROT UR QL STRIP: ABNORMAL
RBC # BLD AUTO: 4.3 10*6/MM3 (ref 3.77–5.28)
RBC # UR STRIP: ABNORMAL /HPF
REF LAB TEST METHOD: ABNORMAL
SODIUM SERPL-SCNC: 134 MMOL/L (ref 136–145)
SP GR UR STRIP: 1.02 (ref 1–1.03)
SQUAMOUS #/AREA URNS HPF: ABNORMAL /HPF
TRICYCLICS UR QL SCN: NEGATIVE
TROPONIN T DELTA: 1 NG/L
TROPONIN T SERPL HS-MCNC: 12 NG/L
UROBILINOGEN UR QL STRIP: ABNORMAL
WBC # UR STRIP: ABNORMAL /HPF
WBC NRBC COR # BLD: 9.49 10*3/MM3 (ref 3.4–10.8)
WHOLE BLOOD HOLD COAG: NORMAL
WHOLE BLOOD HOLD SPECIMEN: NORMAL

## 2023-02-12 PROCEDURE — 25010000002 LORAZEPAM PER 2 MG: Performed by: NURSE PRACTITIONER

## 2023-02-12 PROCEDURE — 36415 COLL VENOUS BLD VENIPUNCTURE: CPT | Performed by: NURSE PRACTITIONER

## 2023-02-12 PROCEDURE — 81001 URINALYSIS AUTO W/SCOPE: CPT

## 2023-02-12 PROCEDURE — 96365 THER/PROPH/DIAG IV INF INIT: CPT

## 2023-02-12 PROCEDURE — 96375 TX/PRO/DX INJ NEW DRUG ADDON: CPT

## 2023-02-12 PROCEDURE — 93005 ELECTROCARDIOGRAM TRACING: CPT | Performed by: NURSE PRACTITIONER

## 2023-02-12 PROCEDURE — 82077 ASSAY SPEC XCP UR&BREATH IA: CPT | Performed by: NURSE PRACTITIONER

## 2023-02-12 PROCEDURE — 96376 TX/PRO/DX INJ SAME DRUG ADON: CPT

## 2023-02-12 PROCEDURE — 80306 DRUG TEST PRSMV INSTRMNT: CPT | Performed by: STUDENT IN AN ORGANIZED HEALTH CARE EDUCATION/TRAINING PROGRAM

## 2023-02-12 PROCEDURE — 99284 EMERGENCY DEPT VISIT MOD MDM: CPT

## 2023-02-12 PROCEDURE — 83735 ASSAY OF MAGNESIUM: CPT | Performed by: NURSE PRACTITIONER

## 2023-02-12 PROCEDURE — 82962 GLUCOSE BLOOD TEST: CPT

## 2023-02-12 PROCEDURE — 96366 THER/PROPH/DIAG IV INF ADDON: CPT

## 2023-02-12 PROCEDURE — 84484 ASSAY OF TROPONIN QUANT: CPT | Performed by: NURSE PRACTITIONER

## 2023-02-12 PROCEDURE — 80053 COMPREHEN METABOLIC PANEL: CPT | Performed by: STUDENT IN AN ORGANIZED HEALTH CARE EDUCATION/TRAINING PROGRAM

## 2023-02-12 PROCEDURE — 85025 COMPLETE CBC W/AUTO DIFF WBC: CPT

## 2023-02-12 PROCEDURE — 93010 ELECTROCARDIOGRAM REPORT: CPT | Performed by: INTERNAL MEDICINE

## 2023-02-12 PROCEDURE — 83690 ASSAY OF LIPASE: CPT | Performed by: STUDENT IN AN ORGANIZED HEALTH CARE EDUCATION/TRAINING PROGRAM

## 2023-02-12 PROCEDURE — 82550 ASSAY OF CK (CPK): CPT | Performed by: NURSE PRACTITIONER

## 2023-02-12 PROCEDURE — 25010000002 MAGNESIUM SULFATE 2 GM/50ML SOLUTION: Performed by: NURSE PRACTITIONER

## 2023-02-12 PROCEDURE — 83605 ASSAY OF LACTIC ACID: CPT | Performed by: NURSE PRACTITIONER

## 2023-02-12 PROCEDURE — 25010000002 ONDANSETRON PER 1 MG: Performed by: NURSE PRACTITIONER

## 2023-02-12 PROCEDURE — 82009 KETONE BODYS QUAL: CPT | Performed by: NURSE PRACTITIONER

## 2023-02-12 PROCEDURE — 82553 CREATINE MB FRACTION: CPT | Performed by: NURSE PRACTITIONER

## 2023-02-12 RX ORDER — MAGNESIUM SULFATE HEPTAHYDRATE 40 MG/ML
2 INJECTION, SOLUTION INTRAVENOUS ONCE
Status: COMPLETED | OUTPATIENT
Start: 2023-02-12 | End: 2023-02-12

## 2023-02-12 RX ORDER — SODIUM CHLORIDE 0.9 % (FLUSH) 0.9 %
10 SYRINGE (ML) INJECTION AS NEEDED
Status: DISCONTINUED | OUTPATIENT
Start: 2023-02-12 | End: 2023-02-12 | Stop reason: HOSPADM

## 2023-02-12 RX ORDER — ONDANSETRON 2 MG/ML
4 INJECTION INTRAMUSCULAR; INTRAVENOUS ONCE
Status: COMPLETED | OUTPATIENT
Start: 2023-02-12 | End: 2023-02-12

## 2023-02-12 RX ORDER — LORAZEPAM 2 MG/ML
1 INJECTION INTRAMUSCULAR ONCE
Status: COMPLETED | OUTPATIENT
Start: 2023-02-12 | End: 2023-02-12

## 2023-02-12 RX ORDER — CLONIDINE HYDROCHLORIDE 0.1 MG/1
0.1 TABLET ORAL ONCE
Status: DISCONTINUED | OUTPATIENT
Start: 2023-02-12 | End: 2023-02-12 | Stop reason: HOSPADM

## 2023-02-12 RX ORDER — CLONIDINE HYDROCHLORIDE 0.1 MG/1
0.1 TABLET ORAL ONCE
Status: COMPLETED | OUTPATIENT
Start: 2023-02-12 | End: 2023-02-12

## 2023-02-12 RX ADMIN — SODIUM CHLORIDE 1000 ML: 9 INJECTION, SOLUTION INTRAVENOUS at 16:46

## 2023-02-12 RX ADMIN — ONDANSETRON 4 MG: 2 INJECTION INTRAMUSCULAR; INTRAVENOUS at 16:47

## 2023-02-12 RX ADMIN — SODIUM CHLORIDE 1000 ML: 9 INJECTION, SOLUTION INTRAVENOUS at 17:11

## 2023-02-12 RX ADMIN — MAGNESIUM SULFATE HEPTAHYDRATE 2 G: 40 INJECTION, SOLUTION INTRAVENOUS at 17:11

## 2023-02-12 RX ADMIN — CLONIDINE HYDROCHLORIDE 0.1 MG: 0.1 TABLET ORAL at 20:12

## 2023-02-12 RX ADMIN — LORAZEPAM 1 MG: 2 INJECTION INTRAMUSCULAR; INTRAVENOUS at 16:47

## 2023-02-12 RX ADMIN — LORAZEPAM 1 MG: 2 INJECTION, SOLUTION INTRAMUSCULAR; INTRAVENOUS at 19:08

## 2023-02-12 NOTE — ED NOTES
Pt reports she typically drinks a fifth of vodka and a 6 pack of beer. Her last drink was around midnight.

## 2023-02-13 LAB
GLUCOSE BLDC GLUCOMTR-MCNC: 272 MG/DL (ref 70–130)
GLUCOSE BLDC GLUCOMTR-MCNC: 287 MG/DL (ref 70–130)

## 2023-02-13 NOTE — ED PROVIDER NOTES
Subjective   History of Present Illness  42-year-old female in the emergency department complaining of nausea vomiting uncontrollable shakes.  Reports she feels like she is withdrawing.  Has not had any alcoholic drink since midnight.  Normally drinks fifth of vodka and beer daily.     History provided by:  Patient   used: No        Review of Systems   Constitutional: Positive for appetite change, chills and fatigue.   HENT: Negative for congestion.    Respiratory: Negative for shortness of breath.    Cardiovascular: Negative for chest pain and palpitations.   Gastrointestinal: Positive for nausea and vomiting.   Genitourinary: Negative for flank pain.   Musculoskeletal: Positive for myalgias. Negative for joint swelling.   Skin: Negative for wound.   Neurological: Positive for tremors and weakness.   Hematological: Negative for adenopathy.   Psychiatric/Behavioral: Negative for confusion.   All other systems reviewed and are negative.      Past Medical History:   Diagnosis Date   • Acute bronchitis    • Acute conjunctivitis     left   • Acute pharyngitis    • Alcohol abuse    • Allergic rhinitis due to pollen    • Anxiety state    • Asthma    • Constipation    • Depressive disorder    • Diarrhea    • GERD (gastroesophageal reflux disease)    • Hypertensive disorder    • Insomnia    • Knee pain    • Migraine    • Nausea    • Nausea and vomiting    • Psychiatric illness    • Rib pain    • Tobacco dependence syndrome    • Type 2 diabetes mellitus (HCC)     uncontrolled   • URI (upper respiratory infection)    • Viral intestinal infection, unspecified    • Wheezing        Allergies   Allergen Reactions   • Insulin Glargine Other (See Comments)     Did not tolerate well   • Lisinopril Cough     Is currently prescribed lisinopril for her HTN, but isn't taking it due to cough; hasn't been back to see the MD   • Penicillins Unknown (See Comments)     Pt was [age] and unsure of reaction       Past  Surgical History:   Procedure Laterality Date   • ABDOMINAL SURGERY     •  SECTION     • INJECTION OF MEDICATION  2016    Kenalog   • INJECTION OF MEDICATION  2015    Toradol   • PAP SMEAR  2010   • TUBAL ABDOMINAL LIGATION         Family History   Problem Relation Age of Onset   • Drug abuse Mother         addicted to pain pills,  from overdose   • Suicide Attempts Mother    • Heart attack Father    • Alcohol abuse Father    • Hepatitis Sister         IV drug use   • Drug abuse Sister    • Bipolar disorder Sister    • Colon cancer Maternal Grandmother          at age 19   • Heart disease Maternal Grandfather    • Tuberculosis Paternal Grandfather    • ADD / ADHD Son        Social History     Socioeconomic History   • Marital status:    • Number of children: 2   • Years of education: 12   Tobacco Use   • Smoking status: Every Day     Packs/day: 1.00     Years: 8.00     Pack years: 8.00     Types: Cigarettes   • Smokeless tobacco: Never   Substance and Sexual Activity   • Alcohol use: Yes     Comment: fifth of vodka and 6 pack of beer daily   • Drug use: No     Types: Marijuana     Comment: when was younger   • Sexual activity: Never     Birth control/protection: Surgical           Objective   Physical Exam  Vitals and nursing note reviewed.   Constitutional:       Appearance: She is well-developed. She is ill-appearing.   HENT:      Head: Normocephalic.      Nose: Nose normal.   Eyes:      Conjunctiva/sclera: Conjunctivae normal.      Pupils: Pupils are equal, round, and reactive to light.   Cardiovascular:      Rate and Rhythm: Regular rhythm. Tachycardia present.      Heart sounds: Normal heart sounds.   Pulmonary:      Effort: Pulmonary effort is normal.      Breath sounds: Normal breath sounds.   Abdominal:      Palpations: Abdomen is soft.   Musculoskeletal:         General: Normal range of motion.      Cervical back: Normal range of motion.   Skin:     General: Skin  is warm and dry.   Neurological:      Mental Status: She is alert and oriented to person, place, and time.      GCS: GCS eye subscore is 4. GCS verbal subscore is 5. GCS motor subscore is 6.         ECG 12 Lead      Date/Time: 2/12/2023 6:08 PM  Performed by: Arnaldo Castillo APRN  Authorized by: Shane Villasenor MD   Interpreted by physician  Comparison: not compared with previous ECG   Rhythm: sinus tachycardia  Rate: tachycardic  QRS axis: normal  Conduction: conduction normal  ST Segments: ST segments normal  T Waves: T waves normal  Clinical impression: abnormal ECG                 ED Course      No orders to display     Results for orders placed or performed during the hospital encounter of 02/12/23   Comprehensive Metabolic Panel    Specimen: Blood   Result Value Ref Range    Glucose 340 (H) 65 - 99 mg/dL    BUN 9 6 - 20 mg/dL    Creatinine 0.79 0.57 - 1.00 mg/dL    Sodium 134 (L) 136 - 145 mmol/L    Potassium 4.2 3.5 - 5.2 mmol/L    Chloride 91 (L) 98 - 107 mmol/L    CO2 22.0 22.0 - 29.0 mmol/L    Calcium 9.9 8.6 - 10.5 mg/dL    Total Protein 8.4 6.0 - 8.5 g/dL    Albumin 4.9 3.5 - 5.2 g/dL    ALT (SGPT) 108 (H) 1 - 33 U/L    AST (SGOT) 106 (H) 1 - 32 U/L    Alkaline Phosphatase 96 39 - 117 U/L    Total Bilirubin 0.9 0.0 - 1.2 mg/dL    Globulin 3.5 gm/dL    A/G Ratio 1.4 g/dL    BUN/Creatinine Ratio 11.4 7.0 - 25.0    Anion Gap 21.0 (H) 5.0 - 15.0 mmol/L    eGFR 94.7 >60.0 mL/min/1.73   Lipase    Specimen: Blood   Result Value Ref Range    Lipase 31 13 - 60 U/L   Urinalysis With Microscopic If Indicated (No Culture) - Urine, Clean Catch    Specimen: Urine, Clean Catch   Result Value Ref Range    Color, UA Yellow Yellow, Straw, Dark Yellow, Nyasia    Appearance, UA Clear Clear    pH, UA 7.0 5.0 - 9.0    Specific Gravity, UA 1.020 1.003 - 1.030    Glucose, UA >=1000 mg/dL (3+) (A) Negative    Ketones, UA 80 mg/dL (3+) (A) Negative    Bilirubin, UA Negative Negative    Blood, UA Trace (A) Negative    Protein,   mg/dL (2+) (A) Negative    Leuk Esterase, UA Negative Negative    Nitrite, UA Negative Negative    Urobilinogen, UA 0.2 E.U./dL 0.2 - 1.0 E.U./dL   CBC Auto Differential    Specimen: Blood   Result Value Ref Range    WBC 9.49 3.40 - 10.80 10*3/mm3    RBC 4.30 3.77 - 5.28 10*6/mm3    Hemoglobin 14.3 12.0 - 15.9 g/dL    Hematocrit 41.8 34.0 - 46.6 %    MCV 97.2 (H) 79.0 - 97.0 fL    MCH 33.3 (H) 26.6 - 33.0 pg    MCHC 34.2 31.5 - 35.7 g/dL    RDW 12.5 12.3 - 15.4 %    RDW-SD 45.0 37.0 - 54.0 fl    MPV 9.4 6.0 - 12.0 fL    Platelets 230 140 - 450 10*3/mm3    Neutrophil % 83.8 (H) 42.7 - 76.0 %    Lymphocyte % 8.7 (L) 19.6 - 45.3 %    Monocyte % 6.4 5.0 - 12.0 %    Eosinophil % 0.1 (L) 0.3 - 6.2 %    Basophil % 0.7 0.0 - 1.5 %    Immature Grans % 0.3 0.0 - 0.5 %    Neutrophils, Absolute 7.94 (H) 1.70 - 7.00 10*3/mm3    Lymphocytes, Absolute 0.83 0.70 - 3.10 10*3/mm3    Monocytes, Absolute 0.61 0.10 - 0.90 10*3/mm3    Eosinophils, Absolute 0.01 0.00 - 0.40 10*3/mm3    Basophils, Absolute 0.07 0.00 - 0.20 10*3/mm3    Immature Grans, Absolute 0.03 0.00 - 0.05 10*3/mm3    nRBC 0.0 0.0 - 0.2 /100 WBC   Urinalysis, Microscopic Only - Urine, Clean Catch    Specimen: Urine, Clean Catch   Result Value Ref Range    RBC, UA 0-2 (A) None Seen /HPF    WBC, UA 0-2 None Seen, 0-2, 3-5 /HPF    Bacteria, UA None Seen None Seen /HPF    Squamous Epithelial Cells, UA 0-2 None Seen, 0-2 /HPF    Hyaline Casts, UA 0-2 None Seen /LPF    Methodology Automated Microscopy    Urine Drug Screen - Urine, Clean Catch    Specimen: Urine, Clean Catch   Result Value Ref Range    THC, Screen, Urine Negative Negative    Phencyclidine (PCP), Urine Negative Negative    Cocaine Screen, Urine Negative Negative    Methamphetamine, Ur Negative Negative    Opiate Screen Negative Negative    Amphetamine Screen, Urine Negative Negative    Benzodiazepine Screen, Urine Negative Negative    Tricyclic Antidepressants Screen Negative Negative    Methadone  Screen, Urine Negative Negative    Barbiturates Screen, Urine Negative Negative    Oxycodone Screen, Urine Negative Negative    Propoxyphene Screen Negative Negative    Buprenorphine, Screen, Urine Negative Negative   Magnesium    Specimen: Blood   Result Value Ref Range    Magnesium 1.4 (L) 1.6 - 2.6 mg/dL   Lactic Acid, Plasma    Specimen: Blood   Result Value Ref Range    Lactate 4.0 (C) 0.5 - 2.0 mmol/L   STAT Lactic Acid, Reflex    Specimen: Blood   Result Value Ref Range    Lactate 1.5 0.5 - 2.0 mmol/L   Ethanol    Specimen: Blood   Result Value Ref Range    Ethanol <10 0 - 10 mg/dL    Ethanol % <0.010 %   High Sensitivity Troponin T    Specimen: Blood   Result Value Ref Range    HS Troponin T 12 (H) <10 ng/L   CK    Specimen: Blood   Result Value Ref Range    Creatine Kinase 282 (H) 20 - 180 U/L   CK-MB    Specimen: Blood   Result Value Ref Range    CKMB 7.41 (H) <=5.30 ng/mL   High Sensitivity Troponin T 2Hr    Specimen: Blood   Result Value Ref Range    HS Troponin T 13 (H) <10 ng/L    Troponin T Delta 1 >=-4 - <+4 ng/L   Acetone    Specimen: Blood   Result Value Ref Range    Acetone Negative Negative   POC Glucose Once    Specimen: Blood   Result Value Ref Range    Glucose 338 (H) 70 - 130 mg/dL   ECG 12 Lead Tachycardia   Result Value Ref Range    QT Interval 350 ms    QTC Interval 484 ms   Green Top (Gel)   Result Value Ref Range    Extra Tube Hold for add-ons.    Lavender Top   Result Value Ref Range    Extra Tube hold for add-on    Gold Top - SST   Result Value Ref Range    Extra Tube Hold for add-ons.    Light Blue Top   Result Value Ref Range    Extra Tube Hold for add-ons.                                           Medical Decision Making  Patient in the emergency department complaining of alcohol withdrawal.  See HPI.  Patient has JACINTO with a small gap.  Hyperglycemia hypertension.  Rehydration given in the emergency department, Ativan and clonidine for withdrawal treatment and seizure prevention.   Patient's had this symptoms in the past.   at bedside.  Discussed observation, patient agreeable for discharge and outpatient follow-up.  Patient feels much better and is okay to go home.  She will return to the emergency department if signs and symptoms persist or return.    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 Decision-making details documented in ED Course.  ECG/medicine tests: ordered and independent interpretation performed.      Risk  OTC drugs.  Prescription drug management.  Decision regarding hospitalization.          Final diagnoses:   Alcohol withdrawal syndrome without complication (HCC)   Hypertension due to endocrine disorder       ED Disposition  ED Disposition     ED Disposition   Discharge    Condition   Stable    Comment   --             Provider, No Known  [facility] [id number]    Call in 1 week           Medication List      No changes were made to your prescriptions during this visit.          Arnaldo Castillo, APRN  02/12/23 [id number]

## 2023-02-25 DIAGNOSIS — R11.0 NAUSEA: ICD-10-CM

## 2023-02-25 DIAGNOSIS — K21.9 GASTROESOPHAGEAL REFLUX DISEASE WITHOUT ESOPHAGITIS: ICD-10-CM

## 2023-02-27 LAB
QT INTERVAL: 332 MS
QTC INTERVAL: 460 MS

## 2023-02-27 RX ORDER — MULTIVITAMIN
TABLET ORAL
Qty: 30 TABLET | Refills: 0 | Status: SHIPPED | OUTPATIENT
Start: 2023-02-27

## 2023-02-27 RX ORDER — DEXTRIN 3 G/3.8 G
POWDER (GRAM) ORAL
Qty: 30 TABLET | Refills: 2 | Status: SHIPPED | OUTPATIENT
Start: 2023-02-27

## 2023-03-31 DIAGNOSIS — J41.0 SIMPLE CHRONIC BRONCHITIS: ICD-10-CM

## 2023-03-31 RX ORDER — ALBUTEROL SULFATE 90 UG/1
2 AEROSOL, METERED RESPIRATORY (INHALATION)
Qty: 18 G | Refills: 2 | Status: SHIPPED | OUTPATIENT
Start: 2023-03-31

## 2023-04-11 DIAGNOSIS — F41.9 ANXIETY: ICD-10-CM

## 2023-04-11 DIAGNOSIS — Z79.4 TYPE 2 DIABETES MELLITUS WITH HYPERGLYCEMIA, WITH LONG-TERM CURRENT USE OF INSULIN: ICD-10-CM

## 2023-04-11 DIAGNOSIS — E11.65 TYPE 2 DIABETES MELLITUS WITH HYPERGLYCEMIA, WITH LONG-TERM CURRENT USE OF INSULIN: ICD-10-CM

## 2023-04-11 DIAGNOSIS — I10 PRIMARY HYPERTENSION: ICD-10-CM

## 2023-04-11 DIAGNOSIS — F33.1 MODERATE EPISODE OF RECURRENT MAJOR DEPRESSIVE DISORDER: ICD-10-CM

## 2023-04-11 DIAGNOSIS — K21.9 GASTROESOPHAGEAL REFLUX DISEASE WITHOUT ESOPHAGITIS: ICD-10-CM

## 2023-04-11 RX ORDER — CITALOPRAM 40 MG/1
TABLET ORAL
Qty: 30 TABLET | Refills: 2 | Status: SHIPPED | OUTPATIENT
Start: 2023-04-11

## 2023-04-11 RX ORDER — LOSARTAN POTASSIUM 25 MG/1
75 TABLET ORAL
Qty: 90 TABLET | Refills: 2 | Status: SHIPPED | OUTPATIENT
Start: 2023-04-11

## 2023-04-11 RX ORDER — HYDROXYZINE HYDROCHLORIDE 25 MG/1
TABLET, FILM COATED ORAL
Qty: 90 TABLET | Refills: 2 | Status: SHIPPED | OUTPATIENT
Start: 2023-04-11

## 2023-04-11 RX ORDER — OMEPRAZOLE 40 MG/1
40 CAPSULE, DELAYED RELEASE ORAL DAILY
Qty: 30 CAPSULE | Refills: 2 | Status: SHIPPED | OUTPATIENT
Start: 2023-04-11

## 2023-07-24 ENCOUNTER — TELEPHONE (OUTPATIENT)
Dept: FAMILY MEDICINE CLINIC | Facility: CLINIC | Age: 43
End: 2023-07-24
Payer: COMMERCIAL

## 2023-07-24 NOTE — TELEPHONE ENCOUNTER
Pt called and would like a call back to discuss lab results so she can start her Mounjaro/ ozempic. Call back at [phone]    Thanks

## 2023-07-25 ENCOUNTER — TELEPHONE (OUTPATIENT)
Dept: FAMILY MEDICINE CLINIC | Facility: CLINIC | Age: 43
End: 2023-07-25
Payer: COMMERCIAL

## 2023-07-25 ENCOUNTER — TELEPHONE (OUTPATIENT)
Dept: ADMINISTRATIVE | Facility: CLINIC | Age: 43
End: 2023-07-25
Payer: COMMERCIAL

## 2023-07-25 DIAGNOSIS — E11.65 TYPE 2 DIABETES MELLITUS WITH HYPERGLYCEMIA, WITH LONG-TERM CURRENT USE OF INSULIN: Primary | ICD-10-CM

## 2023-07-25 DIAGNOSIS — Z79.4 TYPE 2 DIABETES MELLITUS WITH HYPERGLYCEMIA, WITH LONG-TERM CURRENT USE OF INSULIN: Primary | ICD-10-CM

## 2023-07-25 RX ORDER — TIRZEPATIDE 2.5 MG/.5ML
2.5 INJECTION, SOLUTION SUBCUTANEOUS WEEKLY
Qty: 2 ML | Refills: 0 | Status: SHIPPED | OUTPATIENT
Start: 2023-07-25 | End: 2023-07-26

## 2023-07-25 NOTE — TELEPHONE ENCOUNTER
Pt called and stated the cost of her Mounjaro is $1200 and would like a PA sent to her insurance please. Call back with any information at [phone].    Thanks

## 2023-07-25 NOTE — TELEPHONE ENCOUNTER
Today I returned the patient's call.  She has a friend that had given her some Mounjaro a few months ago and she has been taking it for the last 2 months.  She reports that it is working so well for her that she has stopped taking her own insulin.  She admitted any way that the insulin caused a rash at the injection site.  She reports that the metformin that she was previously on gave her an upset stomach.  Her glucose levels reportedly have been in the 120s lately.  Since the Mounjaro works so well for her she would really like to have her own prescription for it.  She recently saw one of my colleagues Dr. Jon who detailed some of this in his clinic note.  Advised her that I would attempt to get prior authorization for her to get her own Mounjaro.  Her HbA1c done after Dr. Jon's visit was 5.8.  This was a reduction from her previous level of 6.5.  CMP done on 7/19 shows a glucose level 157.  Liver enzymes are elevated with ALT at 38 and AST at 36.  Alkaline phosphatase was normal at 65.        This document has been electronically signed by Arthur Mercado MD on July 25, 2023 16:49 CDT

## 2023-07-26 ENCOUNTER — DOCUMENTATION (OUTPATIENT)
Dept: FAMILY MEDICINE CLINIC | Facility: CLINIC | Age: 43
End: 2023-07-26
Payer: COMMERCIAL

## 2023-07-26 DIAGNOSIS — E11.65 TYPE 2 DIABETES MELLITUS WITH HYPERGLYCEMIA, WITH LONG-TERM CURRENT USE OF INSULIN: ICD-10-CM

## 2023-07-26 DIAGNOSIS — Z79.4 TYPE 2 DIABETES MELLITUS WITH HYPERGLYCEMIA, WITH LONG-TERM CURRENT USE OF INSULIN: ICD-10-CM

## 2023-07-26 RX ORDER — TIRZEPATIDE 2.5 MG/.5ML
2.5 INJECTION, SOLUTION SUBCUTANEOUS WEEKLY
Qty: 2 ML | Refills: 0 | Status: SHIPPED | OUTPATIENT
Start: 2023-07-26 | End: 2023-07-28 | Stop reason: SDUPTHER

## 2023-07-28 ENCOUNTER — DOCUMENTATION (OUTPATIENT)
Dept: FAMILY MEDICINE CLINIC | Facility: CLINIC | Age: 43
End: 2023-07-28
Payer: COMMERCIAL

## 2023-07-28 DIAGNOSIS — Z79.4 TYPE 2 DIABETES MELLITUS WITH HYPERGLYCEMIA, WITH LONG-TERM CURRENT USE OF INSULIN: ICD-10-CM

## 2023-07-28 DIAGNOSIS — E11.65 TYPE 2 DIABETES MELLITUS WITH HYPERGLYCEMIA, WITH LONG-TERM CURRENT USE OF INSULIN: ICD-10-CM

## 2023-07-28 RX ORDER — TIRZEPATIDE 2.5 MG/.5ML
2.5 INJECTION, SOLUTION SUBCUTANEOUS WEEKLY
Qty: 2 ML | Refills: 0 | Status: SHIPPED | OUTPATIENT
Start: 2023-07-28

## 2023-08-21 DIAGNOSIS — Z79.4 TYPE 2 DIABETES MELLITUS WITH HYPERGLYCEMIA, WITH LONG-TERM CURRENT USE OF INSULIN: ICD-10-CM

## 2023-08-21 DIAGNOSIS — E11.65 TYPE 2 DIABETES MELLITUS WITH HYPERGLYCEMIA, WITH LONG-TERM CURRENT USE OF INSULIN: ICD-10-CM

## 2023-08-21 RX ORDER — TIRZEPATIDE 2.5 MG/.5ML
INJECTION, SOLUTION SUBCUTANEOUS
Qty: 0.5 ML | Refills: 0 | Status: SHIPPED | OUTPATIENT
Start: 2023-08-21

## 2023-09-20 DIAGNOSIS — E11.65 TYPE 2 DIABETES MELLITUS WITH HYPERGLYCEMIA, WITH LONG-TERM CURRENT USE OF INSULIN: ICD-10-CM

## 2023-09-20 DIAGNOSIS — Z79.4 TYPE 2 DIABETES MELLITUS WITH HYPERGLYCEMIA, WITH LONG-TERM CURRENT USE OF INSULIN: ICD-10-CM

## 2023-09-20 RX ORDER — TIRZEPATIDE 2.5 MG/.5ML
2.5 INJECTION, SOLUTION SUBCUTANEOUS WEEKLY
Qty: 0.5 ML | Refills: 0 | Status: SHIPPED | OUTPATIENT
Start: 2023-09-20